# Patient Record
Sex: MALE | Race: OTHER | Employment: UNEMPLOYED | URBAN - METROPOLITAN AREA
[De-identification: names, ages, dates, MRNs, and addresses within clinical notes are randomized per-mention and may not be internally consistent; named-entity substitution may affect disease eponyms.]

---

## 2019-11-29 ENCOUNTER — IP HISTORICAL/CONVERTED ENCOUNTER (OUTPATIENT)
Dept: OTHER | Age: 39
End: 2019-11-29

## 2020-10-24 VITALS
OXYGEN SATURATION: 95 % | BODY MASS INDEX: 33.75 KG/M2 | HEART RATE: 89 BPM | TEMPERATURE: 98.2 F | WEIGHT: 210 LBS | HEIGHT: 66 IN | RESPIRATION RATE: 18 BRPM | SYSTOLIC BLOOD PRESSURE: 123 MMHG | DIASTOLIC BLOOD PRESSURE: 67 MMHG

## 2020-10-24 PROBLEM — E11.9 DIABETES MELLITUS (HCC): Status: ACTIVE | Noted: 2019-12-16

## 2020-10-24 RX ORDER — METFORMIN HYDROCHLORIDE 1000 MG/1
TABLET ORAL
COMMUNITY
Start: 2020-10-05 | End: 2020-11-20

## 2020-10-24 RX ORDER — GLIMEPIRIDE 2 MG/1
TABLET ORAL
COMMUNITY
End: 2021-07-07

## 2020-10-24 RX ORDER — BLOOD SUGAR DIAGNOSTIC
STRIP MISCELLANEOUS
COMMUNITY
End: 2020-12-03 | Stop reason: SDUPTHER

## 2020-11-20 RX ORDER — METFORMIN HYDROCHLORIDE 1000 MG/1
TABLET ORAL
Qty: 60 TAB | Refills: 0 | Status: SHIPPED | OUTPATIENT
Start: 2020-11-20 | End: 2020-12-03 | Stop reason: SDUPTHER

## 2020-11-22 ENCOUNTER — HOSPITAL ENCOUNTER (EMERGENCY)
Age: 40
Discharge: HOME | End: 2020-11-22
Attending: EMERGENCY MEDICINE
Payer: COMMERCIAL

## 2020-11-22 VITALS
OXYGEN SATURATION: 99 % | SYSTOLIC BLOOD PRESSURE: 130 MMHG | WEIGHT: 210 LBS | DIASTOLIC BLOOD PRESSURE: 108 MMHG | RESPIRATION RATE: 18 BRPM | TEMPERATURE: 97.9 F | BODY MASS INDEX: 33.89 KG/M2 | HEART RATE: 75 BPM

## 2020-11-22 DIAGNOSIS — R19.7 DIARRHEA, UNSPECIFIED TYPE: Primary | ICD-10-CM

## 2020-11-22 PROCEDURE — 99282 EMERGENCY DEPT VISIT SF MDM: CPT

## 2020-11-22 NOTE — ED TRIAGE NOTES
Pt states he has been having diarrhea for two days. Pt states he has had two covid test done at work that came back negative. Pt works for [employer] and states they will not let him return until he is covid swabbed. Pt denies Sob. Pt reports diarrhea, no changes in taste or appetite. Pt is in no apparent distress. Pt denies pain.

## 2020-11-22 NOTE — ED PROVIDER NOTES
EMERGENCY DEPARTMENT HISTORY AND PHYSICAL EXAM      Date: 11/22/2020  Patient Name: Gail Clemente    History of Presenting Illness     Chief Complaint   Patient presents with    Diarrhea       History Provided By: Patient    HPI: Gail Clemente, 47 y.o. male with a past medical history significant diabetes presents to the ED with cc of diarrhea for 2 days had one episode today    There are no other complaints, changes, or physical findings at this time. PCP: Kushal Burton MD    No current facility-administered medications on file prior to encounter. Current Outpatient Medications on File Prior to Encounter   Medication Sig Dispense Refill    insulin NPH/insulin regular (NovoLIN 70/30 U-100 Insulin) 100 unit/mL (70-30) injection by SubCUTAneous route.  metFORMIN (GLUCOPHAGE) 1,000 mg tablet Take 1 tablet by mouth twice daily 60 Tab 0    glucose blood VI test strips (Accu-Chek Chika Plus test strp) strip Accu-Chek Chika Plus test strips   Use to check sugar once a day  DX: E11.9  NPI [id number]      glimepiride (AMARYL) 2 mg tablet glimepiride 2 mg tablet   TAKE 1 TABLET BY MOUTH ONCE DAILY         Past History     Past Medical History:  Past Medical History:   Diagnosis Date    Diabetes mellitus ([address] 12/16/2019       Past Surgical History:  Past Surgical History:   Procedure Laterality Date    HX APPENDECTOMY         Family History:  Family History   Problem Relation Age of Onset    Diabetes Maternal Grandmother     Diabetes Paternal Grandmother     Hypertension Paternal Grandmother        Social History:  Social History     Tobacco Use    Smoking status: Never Smoker    Smokeless tobacco: Never Used   Substance Use Topics    Alcohol use: Not Currently    Drug use: Not on file       Allergies:  No Known Allergies      Review of Systems     Review of Systems   Constitutional: Positive for fatigue. Negative for chills. HENT: Negative for rhinorrhea and sore throat.     Eyes: Negative for pain and redness. Respiratory: Negative for cough and shortness of breath. Cardiovascular: Negative for chest pain and leg swelling. Gastrointestinal: Positive for diarrhea. Negative for abdominal pain and nausea. Endocrine: Negative for polydipsia and polyphagia. Genitourinary: Negative for dysuria and urgency. Musculoskeletal: Negative for arthralgias and myalgias. Skin: Negative for color change and pallor. Neurological: Negative for weakness and numbness. Psychiatric/Behavioral: Negative. Physical Exam     Physical Exam  Vitals signs and nursing note reviewed. Constitutional:       Appearance: Normal appearance. HENT:      Head: Normocephalic and atraumatic. Nose: Nose normal.      Mouth/Throat:      Mouth: Mucous membranes are moist.      Pharynx: Oropharynx is clear. Eyes:      Extraocular Movements: Extraocular movements intact. Conjunctiva/sclera: Conjunctivae normal.      Pupils: Pupils are equal, round, and reactive to light. Neck:      Musculoskeletal: Normal range of motion and neck supple. Cardiovascular:      Rate and Rhythm: Normal rate and regular rhythm. Pulses: Normal pulses. Heart sounds: Normal heart sounds. Pulmonary:      Effort: Pulmonary effort is normal.      Breath sounds: Normal breath sounds. Abdominal:      General: Bowel sounds are normal.      Palpations: Abdomen is soft. Musculoskeletal: Normal range of motion. Skin:     General: Skin is warm and dry. Capillary Refill: Capillary refill takes less than 2 seconds. Neurological:      General: No focal deficit present. Mental Status: He is alert. Mental status is at baseline. Psychiatric:         Mood and Affect: Mood normal.         Lab and Diagnostic Study Results     Labs -   No results found for this or any previous visit (from the past 12 hour(s)).     Radiologic Studies -   @lastxrresult@  CT Results  (Last 48 hours)    None        CXR Results  (Last 48 hours)    None            Medical Decision Making   - I am the first provider for this patient. - I reviewed the vital signs, available nursing notes, past medical history, past surgical history, family history and social history. - Initial assessment performed. The patients presenting problems have been discussed, and they are in agreement with the care plan formulated and outlined with them. I have encouraged them to ask questions as they arise throughout their visit. Vital Signs-Reviewed the patient's vital signs. Patient Vitals for the past 12 hrs:   Temp Pulse Resp BP SpO2   11/22/20 1622 -- -- -- -- 99 %   11/22/20 1619 97.9 °F (36.6 °C) 91 16 129/85 99 %       Records Reviewed: Nursing Notes    The patient presents with abdominal pain with a differential diagnosis of cholecystitis, diverticulitis and gastroenteritis      ED Course:     ED Course as of Nov 22 1644   Sun Nov 22, 2020 1642 Patient specifically stated he is here for COVID-19 testing; based on patient's presentation appearance vital signs and physical exam there is no emergent reason for me to test patient for Covid-19    [SB]   1643 Patient declined any further lab work    [SB]      ED Course User Index  [SB] Aure Bansal MD       Provider Notes (Medical Decision Making): MDM       Procedures   Medical Decision Makingedical Decision Making  Performed by: Mason Melgar MD  PROCEDURES:  Procedures       Disposition   Disposition: Condition stable        DISCHARGE PLAN:  1. Current Discharge Medication List      CONTINUE these medications which have NOT CHANGED    Details   insulin NPH/insulin regular (NovoLIN 70/30 U-100 Insulin) 100 unit/mL (70-30) injection by SubCUTAneous route.       metFORMIN (GLUCOPHAGE) 1,000 mg tablet Take 1 tablet by mouth twice daily  Qty: 60 Tab, Refills: 0      glucose blood VI test strips (Accu-Chek Chika Plus test strp) strip Accu-Chek Chika Plus test strips   Use to check sugar once a day  DX: E11.9  NPI [id number]      glimepiride (AMARYL) 2 mg tablet glimepiride 2 mg tablet   TAKE 1 TABLET BY MOUTH ONCE DAILY           2. Follow-up Information     Follow up With Specialties Details Why Contact Info    Yrn Abbott MD Family Medicine Schedule an appointment as soon as possible for a visit in 1 day  [id number] [facility]  [phone]          3. Return to ED if worse   4. Current Discharge Medication List            Diagnosis     Clinical Impression:   1. Diarrhea, unspecified type        Attestations:    Miguel Benavides MD    Please note that this dictation was completed with Flo[employer], the [employer] voice recognition software. Quite often unanticipated grammatical, syntax, homophones, and other interpretive errors are inadvertently transcribed by the computer software. Please disregard these errors. Please excuse any errors that have escaped final proofreading. Thank you.

## 2020-11-22 NOTE — LETTER
[address] [facility] EMERGENCY DEPT 
[address] 
[phone] Work/School Note Date: 11/22/2020 To Whom It May concern: 
 
 
Hodan Richardson was seen and treated today in the emergency room by the following provider(s): 
Attending Provider: Harlan Medina MD. Hodan Richardson is excused from work/school on 11/22/20. He is clear to return to work/school on 11/23/20. Sincerely, Riki Alexandra MD

## 2020-12-03 ENCOUNTER — OFFICE VISIT (OUTPATIENT)
Dept: PRIMARY CARE CLINIC | Age: 40
End: 2020-12-03
Payer: COMMERCIAL

## 2020-12-03 VITALS
RESPIRATION RATE: 20 BRPM | HEART RATE: 92 BPM | OXYGEN SATURATION: 96 % | HEIGHT: 66 IN | BODY MASS INDEX: 29.65 KG/M2 | DIASTOLIC BLOOD PRESSURE: 75 MMHG | SYSTOLIC BLOOD PRESSURE: 117 MMHG | TEMPERATURE: 97.3 F | WEIGHT: 184.5 LBS

## 2020-12-03 DIAGNOSIS — Z79.4 TYPE 2 DIABETES MELLITUS WITHOUT COMPLICATION, WITH LONG-TERM CURRENT USE OF INSULIN (HCC): Primary | ICD-10-CM

## 2020-12-03 DIAGNOSIS — E11.9 TYPE 2 DIABETES MELLITUS WITHOUT COMPLICATION, WITH LONG-TERM CURRENT USE OF INSULIN (HCC): Primary | ICD-10-CM

## 2020-12-03 PROCEDURE — 99213 OFFICE O/P EST LOW 20 MIN: CPT | Performed by: FAMILY MEDICINE

## 2020-12-03 RX ORDER — BLOOD SUGAR DIAGNOSTIC
STRIP MISCELLANEOUS
Qty: 180 STRIP | Refills: 1 | Status: SHIPPED | OUTPATIENT
Start: 2020-12-03 | End: 2021-10-25 | Stop reason: SDUPTHER

## 2020-12-03 RX ORDER — METFORMIN HYDROCHLORIDE 1000 MG/1
TABLET ORAL
Qty: 180 TAB | Refills: 1 | Status: SHIPPED | OUTPATIENT
Start: 2020-12-03 | End: 2021-01-05 | Stop reason: SDUPTHER

## 2020-12-03 RX ORDER — LANCETS
EACH MISCELLANEOUS
Qty: 180 EACH | Refills: 1 | Status: SHIPPED | OUTPATIENT
Start: 2020-12-03 | End: 2020-12-11 | Stop reason: SDUPTHER

## 2020-12-03 RX ORDER — BLOOD-GLUCOSE METER
EACH MISCELLANEOUS
Qty: 1 EACH | Refills: 0 | Status: SHIPPED | OUTPATIENT
Start: 2020-12-03 | End: 2021-10-25 | Stop reason: SDUPTHER

## 2020-12-03 NOTE — PROGRESS NOTES
Rosie Christian is a 47 y.o. male who presents today for the following:  Chief Complaint   Patient presents with    Diabetes    Labs    Follow Up Chronic Condition   ,     ICD-10-CM ICD-9-CM    1. Type 2 diabetes mellitus without complication, with long-term current use of insulin ([facility])  E11.9 250.00 metFORMIN (GLUCOPHAGE) 1,000 mg tablet    Z79.4 V58.67 insulin NPH/insulin regular (NovoLIN 70/30 U-100 Insulin) 100 unit/mL (70-30) injection      Blood-Glucose Meter (Accu-Chek Chika Plus Meter) misc      glucose blood VI test strips (Accu-Chek Chika Plus test strp) strip      lancets (Accu-Chek Multiclix Lancet) misc      CBC WITH AUTOMATED DIFF      METABOLIC PANEL, COMPREHENSIVE      LIPID PANEL      URINALYSIS W/ RFLX MICROSCOPIC      MICROALBUMIN, UR, RAND W/ MICROALB/CREAT RATIO      HEMOGLOBIN A1C WITH EAG      C-PEPTIDE    . This patient comes in for follow-up of the emergency room visit where he is found to have very high sugar he was having diarrhea. He restarted his Metformin and glimepiride and he continues to take his insulin. His recent sugars have been better controlled. He feels well and we will get labs today to see how he is doing today. No Known Allergies    Current Outpatient Medications   Medication Sig    metFORMIN (GLUCOPHAGE) 1,000 mg tablet Take 1 tablet by mouth twice daily  Indications: type 2 diabetes mellitus    insulin NPH/insulin regular (NovoLIN 70/30 U-100 Insulin) 100 unit/mL (70-30) injection 26 Units by SubCUTAneous route two (2) times a day.  Indications: type 2 diabetes mellitus    Blood-Glucose Meter (Accu-Chek Chika Plus Meter) misc Check sugar twice a day    glucose blood VI test strips (Accu-Chek Chika Plus test strp) strip Check Sugar twice a day    lancets (Accu-Chek Multiclix Lancet) misc Use twice a day to check sugars    glimepiride (AMARYL) 2 mg tablet glimepiride 2 mg tablet   TAKE 1 TABLET BY MOUTH ONCE DAILY     No current facility-administered medications for this visit. Past Medical History:   Diagnosis Date    Diabetes mellitus ([facility] 12/16/2019       Past Surgical History:   Procedure Laterality Date    HX APPENDECTOMY         Family History   Problem Relation Age of Onset    Diabetes Maternal Grandmother     Diabetes Paternal Grandmother     Hypertension Paternal Grandmother        Social History     Socioeconomic History    Marital status: SINGLE     Spouse name: Not on file    Number of children: Not on file    Years of education: Not on file    Highest education level: Not on file   Occupational History    Not on file   Social Needs    Financial resource strain: Not on file    Food insecurity     Worry: Not on file     Inability: Not on file    Transportation needs     Medical: Not on file     Non-medical: Not on file   Tobacco Use    Smoking status: Never Smoker    Smokeless tobacco: Never Used   Substance and Sexual Activity    Alcohol use: Yes     Comment: Very rarely.  Drug use: Not Currently    Sexual activity: Not on file   Lifestyle    Physical activity     Days per week: Not on file     Minutes per session: Not on file    Stress: Not on file   Relationships    Social connections     Talks on phone: Not on file     Gets together: Not on file     Attends [employer] service: Not on file     Active member of club or organization: Not on file     Attends meetings of clubs or organizations: Not on file     Relationship status: Not on file    Intimate partner violence     Fear of current or ex partner: Not on file     Emotionally abused: Not on file     Physically abused: Not on file     Forced sexual activity: Not on file   Other Topics Concern    Not on file   Social History Narrative    Not on file         Review of Systems   Constitutional: Positive for weight loss. Eyes:        Saw eye doctor 2 months ago   Respiratory: Negative. Cardiovascular: Negative. Gastrointestinal: Negative.     Genitourinary: Negative. Musculoskeletal: Negative. Neurological: Negative. Muscle Cramps   Endo/Heme/Allergies: Will check labs to see how his sugar is doing   Psychiatric/Behavioral: Negative. All other systems reviewed and are negative. Visit Vitals  /75 (BP 1 Location: Right arm, BP Patient Position: Sitting)   Pulse 92   Temp 97.3 °F (36.3 °C) (Skin)   Resp 20   Ht 5' 6\" (1.676 m)   Wt 184 lb 8 oz (83.7 kg)   SpO2 96%   BMI 29.78 kg/m²       Physical Exam  Vitals signs and nursing note reviewed. Constitutional:       Appearance: Normal appearance. He is normal weight. HENT:      Head: Normocephalic and atraumatic. Neck:      Musculoskeletal: Normal range of motion and neck supple. Cardiovascular:      Rate and Rhythm: Normal rate and regular rhythm. Pulses: Normal pulses. Heart sounds: Normal heart sounds. Pulmonary:      Effort: Pulmonary effort is normal.      Breath sounds: Normal breath sounds. Abdominal:      General: Abdomen is flat. Bowel sounds are normal.      Palpations: Abdomen is soft. Musculoskeletal: Normal range of motion. Skin:     General: Skin is warm and dry. Neurological:      General: No focal deficit present. Mental Status: He is alert. Psychiatric:         Mood and Affect: Mood normal.         Behavior: Behavior normal.         Thought Content: Thought content normal.         Judgment: Judgment normal.            ICD-10-CM ICD-9-CM    1.  Type 2 diabetes mellitus without complication, with long-term current use of insulin ([facility])  E11.9 250.00 metFORMIN (GLUCOPHAGE) 1,000 mg tablet    Z79.4 V58.67 insulin NPH/insulin regular (NovoLIN 70/30 U-100 Insulin) 100 unit/mL (70-30) injection      Blood-Glucose Meter (Accu-Chek Chika Plus Meter) misc      glucose blood VI test strips (Accu-Chek Chika Plus test strp) strip      lancets (Accu-Chek Multiclix Lancet) misc      CBC WITH AUTOMATED DIFF      METABOLIC PANEL, COMPREHENSIVE      LIPID PANEL      URINALYSIS W/ RFLX MICROSCOPIC      MICROALBUMIN, UR, RAND W/ MICROALB/CREAT RATIO      HEMOGLOBIN A1C WITH EAG      C-PEPTIDE        1. Type 2 diabetes mellitus without complication, with long-term current use of insulin [facility]  Discussed this patient's diabetes. It is possible he has type 1 diabetes as he is very sensitive to reducing his insulin. He was in DKA with weight loss and frequent urination. We will check labs today to see how he is doing and I instructed him on how serious it is to take his medications regularly and we will get him a glucometer so we can manage this as well. We will consider referring him to an endocrinologist depending on what his numbers show.  - metFORMIN (GLUCOPHAGE) 1,000 mg tablet; Take 1 tablet by mouth twice daily  Indications: type 2 diabetes mellitus  Dispense: 180 Tab; Refill: 1  - insulin NPH/insulin regular (NovoLIN 70/30 U-100 Insulin) 100 unit/mL (70-30) injection; 26 Units by SubCUTAneous route two (2) times a day. Indications: type 2 diabetes mellitus  Dispense: 4680 Units; Refill: 1  - Blood-Glucose Meter (Accu-Chek Chika Plus Meter) misc; Check sugar twice a day  Dispense: 1 Each; Refill: 0  - glucose blood VI test strips (Accu-Chek Chika Plus test strp) strip; Check Sugar twice a day  Dispense: 180 Strip;  Refill: 1  - lancets (Accu-Chek Multiclix Lancet) misc; Use twice a day to check sugars  Dispense: 180 Each; Refill: 1  - CBC WITH AUTOMATED DIFF  - METABOLIC PANEL, COMPREHENSIVE  - LIPID PANEL  - URINALYSIS W/ RFLX MICROSCOPIC  - MICROALBUMIN, UR, RAND W/ MICROALB/CREAT RATIO  - HEMOGLOBIN A1C WITH EAG  - C-PEPTIDE

## 2020-12-04 LAB
ALBUMIN SERPL-MCNC: 4.6 G/DL (ref 4–5)
ALBUMIN/CREAT UR: 50 MG/G CREAT (ref 0–29)
ALBUMIN/GLOB SERPL: 1.7 {RATIO} (ref 1.2–2.2)
ALP SERPL-CCNC: 240 IU/L (ref 39–117)
ALT SERPL-CCNC: 37 IU/L (ref 0–44)
APPEARANCE UR: CLEAR
AST SERPL-CCNC: 22 IU/L (ref 0–40)
BASOPHILS # BLD AUTO: 0.1 X10E3/UL (ref 0–0.2)
BASOPHILS NFR BLD AUTO: 1 %
BILIRUB SERPL-MCNC: 0.6 MG/DL (ref 0–1.2)
BILIRUB UR QL STRIP: NEGATIVE
BUN SERPL-MCNC: 11 MG/DL (ref 6–20)
BUN/CREAT SERPL: 15 (ref 9–20)
C PEPTIDE SERPL-MCNC: 1 NG/ML (ref 1.1–4.4)
CALCIUM SERPL-MCNC: 9.8 MG/DL (ref 8.7–10.2)
CHLORIDE SERPL-SCNC: 90 MMOL/L (ref 96–106)
CHOLEST SERPL-MCNC: 250 MG/DL (ref 100–199)
CO2 SERPL-SCNC: 22 MMOL/L (ref 20–29)
COLOR UR: YELLOW
CREAT SERPL-MCNC: 0.73 MG/DL (ref 0.76–1.27)
CREAT UR-MCNC: 21.8 MG/DL
EOSINOPHIL # BLD AUTO: 0.2 X10E3/UL (ref 0–0.4)
EOSINOPHIL NFR BLD AUTO: 2 %
ERYTHROCYTE [DISTWIDTH] IN BLOOD BY AUTOMATED COUNT: 12.4 % (ref 11.6–15.4)
EST. AVERAGE GLUCOSE BLD GHB EST-MCNC: >398 MG/DL
GLOBULIN SER CALC-MCNC: 2.7 G/DL (ref 1.5–4.5)
GLUCOSE SERPL-MCNC: 472 MG/DL (ref 65–99)
GLUCOSE UR QL: ABNORMAL
HBA1C MFR BLD: >15.5 % (ref 4.8–5.6)
HCT VFR BLD AUTO: 42.4 % (ref 37.5–51)
HDLC SERPL-MCNC: 51 MG/DL
HGB BLD-MCNC: 14.1 G/DL (ref 13–17.7)
HGB UR QL STRIP: NEGATIVE
IMM GRANULOCYTES # BLD AUTO: 0 X10E3/UL (ref 0–0.1)
IMM GRANULOCYTES NFR BLD AUTO: 0 %
KETONES UR QL STRIP: ABNORMAL
LDLC SERPL CALC-MCNC: 155 MG/DL (ref 0–99)
LEUKOCYTE ESTERASE UR QL STRIP: NEGATIVE
LYMPHOCYTES # BLD AUTO: 3.6 X10E3/UL (ref 0.7–3.1)
LYMPHOCYTES NFR BLD AUTO: 36 %
MCH RBC QN AUTO: 30.1 PG (ref 26.6–33)
MCHC RBC AUTO-ENTMCNC: 33.3 G/DL (ref 31.5–35.7)
MCV RBC AUTO: 91 FL (ref 79–97)
MICRO URNS: ABNORMAL
MICROALBUMIN UR-MCNC: 10.8 UG/ML
MONOCYTES # BLD AUTO: 0.4 X10E3/UL (ref 0.1–0.9)
MONOCYTES NFR BLD AUTO: 4 %
NEUTROPHILS # BLD AUTO: 5.7 X10E3/UL (ref 1.4–7)
NEUTROPHILS NFR BLD AUTO: 57 %
NITRITE UR QL STRIP: NEGATIVE
PH UR STRIP: 6 [PH] (ref 5–7.5)
PLATELET # BLD AUTO: 219 X10E3/UL (ref 150–450)
POTASSIUM SERPL-SCNC: 4.8 MMOL/L (ref 3.5–5.2)
PROT SERPL-MCNC: 7.3 G/DL (ref 6–8.5)
PROT UR QL STRIP: NEGATIVE
RBC # BLD AUTO: 4.68 X10E6/UL (ref 4.14–5.8)
SODIUM SERPL-SCNC: 130 MMOL/L (ref 134–144)
SP GR UR: >=1.03 (ref 1–1.03)
TRIGL SERPL-MCNC: 239 MG/DL (ref 0–149)
UROBILINOGEN UR STRIP-MCNC: 0.2 MG/DL (ref 0.2–1)
VLDLC SERPL CALC-MCNC: 44 MG/DL (ref 5–40)
WBC # BLD AUTO: 9.9 X10E3/UL (ref 3.4–10.8)

## 2020-12-10 ENCOUNTER — TELEPHONE (OUTPATIENT)
Dept: PRIMARY CARE CLINIC | Age: 40
End: 2020-12-10

## 2020-12-11 DIAGNOSIS — Z79.4 TYPE 2 DIABETES MELLITUS WITHOUT COMPLICATION, WITH LONG-TERM CURRENT USE OF INSULIN (HCC): ICD-10-CM

## 2020-12-11 DIAGNOSIS — E11.9 TYPE 2 DIABETES MELLITUS WITHOUT COMPLICATION, WITH LONG-TERM CURRENT USE OF INSULIN (HCC): ICD-10-CM

## 2020-12-11 RX ORDER — LANCETS
EACH MISCELLANEOUS
Qty: 200 EACH | Refills: 1 | Status: SHIPPED | OUTPATIENT
Start: 2020-12-11 | End: 2021-10-25 | Stop reason: SDUPTHER

## 2020-12-28 ENCOUNTER — TELEPHONE (OUTPATIENT)
Dept: PRIMARY CARE CLINIC | Age: 40
End: 2020-12-28

## 2020-12-30 ENCOUNTER — TELEPHONE (OUTPATIENT)
Dept: PRIMARY CARE CLINIC | Age: 40
End: 2020-12-30

## 2020-12-30 NOTE — TELEPHONE ENCOUNTER
Attempted to contact patient, lvm for patient to please return call.
Clarify why he wants to stop the metformin. The Glimepiride is not likely strong enough to control his sugar. If he has side effects from the metformin, we could decrease the dose of metformin and add the Glimepiride.    If he insists on trying the Glimepiride by itself, he would need to get a fasting Comp Chem 2 weeks after stopping the Metformin
no

## 2021-01-04 ENCOUNTER — TELEPHONE (OUTPATIENT)
Dept: PRIMARY CARE CLINIC | Age: 41
End: 2021-01-04

## 2021-01-04 DIAGNOSIS — E11.69 HYPERLIPIDEMIA ASSOCIATED WITH TYPE 2 DIABETES MELLITUS (HCC): ICD-10-CM

## 2021-01-04 DIAGNOSIS — Z79.4 TYPE 2 DIABETES MELLITUS WITHOUT COMPLICATION, WITH LONG-TERM CURRENT USE OF INSULIN (HCC): Primary | ICD-10-CM

## 2021-01-04 DIAGNOSIS — E11.9 TYPE 2 DIABETES MELLITUS WITHOUT COMPLICATION, WITH LONG-TERM CURRENT USE OF INSULIN (HCC): Primary | ICD-10-CM

## 2021-01-04 DIAGNOSIS — E78.5 HYPERLIPIDEMIA ASSOCIATED WITH TYPE 2 DIABETES MELLITUS (HCC): ICD-10-CM

## 2021-01-04 RX ORDER — ROSUVASTATIN CALCIUM 10 MG/1
10 TABLET, COATED ORAL
Qty: 90 TAB | Refills: 1 | Status: SHIPPED | OUTPATIENT
Start: 2021-01-04 | End: 2021-10-19 | Stop reason: SDUPTHER

## 2021-01-05 ENCOUNTER — TELEPHONE (OUTPATIENT)
Dept: PRIMARY CARE CLINIC | Age: 41
End: 2021-01-05

## 2021-01-05 DIAGNOSIS — Z79.4 TYPE 2 DIABETES MELLITUS WITHOUT COMPLICATION, WITH LONG-TERM CURRENT USE OF INSULIN (HCC): ICD-10-CM

## 2021-01-05 DIAGNOSIS — E11.9 TYPE 2 DIABETES MELLITUS WITHOUT COMPLICATION, WITH LONG-TERM CURRENT USE OF INSULIN (HCC): ICD-10-CM

## 2021-01-05 RX ORDER — METFORMIN HYDROCHLORIDE 1000 MG/1
TABLET ORAL
Qty: 60 TAB | Refills: 1 | Status: SHIPPED | OUTPATIENT
Start: 2021-01-05 | End: 2021-10-19 | Stop reason: SDUPTHER

## 2021-01-08 ENCOUNTER — TELEPHONE (OUTPATIENT)
Dept: PRIMARY CARE CLINIC | Age: 41
End: 2021-01-08

## 2021-02-22 DIAGNOSIS — E11.9 TYPE 2 DIABETES MELLITUS WITHOUT COMPLICATION, WITH LONG-TERM CURRENT USE OF INSULIN (HCC): ICD-10-CM

## 2021-02-22 DIAGNOSIS — Z79.4 TYPE 2 DIABETES MELLITUS WITHOUT COMPLICATION, WITH LONG-TERM CURRENT USE OF INSULIN (HCC): ICD-10-CM

## 2021-02-25 NOTE — TELEPHONE ENCOUNTER
Requested Prescriptions     Pending Prescriptions Disp Refills    insulin NPH/insulin regular (NovoLIN 70/30 U-100 Insulin) 100 unit/mL (70-30) injection 4680 Units 1     Si Units by SubCUTAneous route two (2) times a day. Indications: type 2 diabetes mellitus         Pt last seen in office 2020. Labs done 2020.

## 2021-07-07 ENCOUNTER — HOSPITAL ENCOUNTER (EMERGENCY)
Age: 41
Discharge: HOME | End: 2021-07-07
Attending: EMERGENCY MEDICINE
Payer: COMMERCIAL

## 2021-07-07 VITALS
SYSTOLIC BLOOD PRESSURE: 126 MMHG | TEMPERATURE: 97.5 F | DIASTOLIC BLOOD PRESSURE: 74 MMHG | HEART RATE: 87 BPM | RESPIRATION RATE: 18 BRPM | OXYGEN SATURATION: 99 %

## 2021-07-07 DIAGNOSIS — S39.012A LOW BACK STRAIN, INITIAL ENCOUNTER: Primary | ICD-10-CM

## 2021-07-07 DIAGNOSIS — M77.8 LEFT WRIST TENDINITIS: ICD-10-CM

## 2021-07-07 PROCEDURE — 99283 EMERGENCY DEPT VISIT LOW MDM: CPT

## 2021-07-07 PROCEDURE — 74011250637 HC RX REV CODE- 250/637: Performed by: EMERGENCY MEDICINE

## 2021-07-07 RX ORDER — IBUPROFEN 600 MG/1
600 TABLET ORAL
Qty: 20 TABLET | Refills: 0 | Status: SHIPPED | OUTPATIENT
Start: 2021-07-07 | End: 2022-03-16 | Stop reason: SDUPTHER

## 2021-07-07 RX ORDER — IBUPROFEN 800 MG/1
800 TABLET ORAL
Status: COMPLETED | OUTPATIENT
Start: 2021-07-07 | End: 2021-07-07

## 2021-07-07 RX ADMIN — IBUPROFEN 800 MG: 800 TABLET, FILM COATED ORAL at 10:57

## 2021-07-07 NOTE — ED PROVIDER NOTES
EMERGENCY DEPARTMENT HISTORY AND PHYSICAL EXAM      Date: 7/7/2021  Patient Name: Kiera Castro    History of Presenting Illness     Chief Complaint   Patient presents with    Back Pain    Wrist Pain       History Provided By: Patient    HPI: Kiera Castro, 47 y.o. 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 PCP: Mallika Desai MD    No current facility-administered medications on file prior to encounter. Current Outpatient Medications on File Prior to Encounter   Medication Sig Dispense Refill    insulin NPH/insulin regular (NovoLIN 70/30 U-100 Insulin) 100 unit/mL (70-30) injection 26 Units by SubCUTAneous route two (2) times a day. Indications: type 2 diabetes mellitus 4680 Units 1    metFORMIN (GLUCOPHAGE) 1,000 mg tablet Take 1 tablet by mouth twice daily  Indications: type 2 diabetes mellitus 60 Tab 1    rosuvastatin (CRESTOR) 10 mg tablet Take 1 Tab by mouth nightly.  Indications: high cholesterol and high triglycerides 90 Tab 1    lancets (Accu-Chek Multiclix Lancet) misc Use twice a day to check sugars 200 Each 1    Blood-Glucose Meter (Accu-Chek Chika Plus Meter) misc Check sugar twice a day 1 Each 0    glucose blood VI test strips (Accu-Chek Chika Plus test strp) strip Check Sugar twice a day 180 Strip 1    [DISCONTINUED] glimepiride (AMARYL) 2 mg tablet glimepiride 2 mg tablet   TAKE 1 TABLET BY MOUTH ONCE DAILY         Past History     Past Medical History:  Past Medical History:   Diagnosis Date    Diabetes mellitus ([address] 12/16/2019       Past Surgical History:  Past Surgical History:   Procedure Laterality Date    HX APPENDECTOMY         Family History:  Family History   Problem Relation Age of Onset    Diabetes Maternal Grandmother     Diabetes Paternal Grandmother     Hypertension Paternal Grandmother        Social History:  Social History     Tobacco Use    Smoking status: Never Smoker    Smokeless tobacco: Never Used   Substance Use Topics    Alcohol use: Yes     Comment: Very rarely.  Drug use: Not Currently       Allergies:  No Known Allergies      Review of Systems     Review of Systems   Constitutional: Negative for chills and fever. HENT: Negative for rhinorrhea and sore throat. Eyes: Negative for pain and visual disturbance. Respiratory: Negative for cough and shortness of breath. Cardiovascular: Negative for chest pain and leg swelling. Gastrointestinal: Negative for abdominal pain and vomiting. Endocrine: Negative for polydipsia and polyuria. Genitourinary: Negative for dysuria and urgency. Musculoskeletal: Positive for arthralgias, back pain and myalgias. Negative for neck pain and neck stiffness. Skin: Negative for color change and pallor. Neurological: Negative for weakness and numbness. Psychiatric/Behavioral: Negative. Physical Exam     Physical Exam  Vitals and nursing note reviewed. Constitutional:       Appearance: Normal appearance. HENT:      Head: Normocephalic and atraumatic. Mouth/Throat:      Mouth: Mucous membranes are moist.      Pharynx: Oropharynx is clear. Eyes:      Extraocular Movements: Extraocular movements intact. Conjunctiva/sclera: Conjunctivae normal.      Pupils: Pupils are equal, round, and reactive to light. Cardiovascular:      Rate and Rhythm: Normal rate and regular rhythm. Pulses: Normal pulses. Heart sounds: Normal heart sounds. Pulmonary:      Effort: Pulmonary effort is normal.      Breath sounds: Normal breath sounds. Abdominal:      General: Bowel sounds are normal.      Palpations: Abdomen is soft. Musculoskeletal:         General: Tenderness present. No deformity or signs of injury. Normal range of motion. Cervical back: Normal, normal range of motion and neck supple. Thoracic back: Normal.      Lumbar back: Tenderness present. No deformity or bony tenderness. Back:    Skin:     General: Skin is warm and dry. Capillary Refill: Capillary refill takes less than 2 seconds. Neurological:      General: No focal deficit present. Mental Status: He is alert and oriented to person, place, and time. Psychiatric:         Mood and Affect: Mood normal.         Behavior: Behavior normal.         Lab and Diagnostic Study Results     Labs -   No results found for this or any previous visit (from the past 12 hour(s)). Radiologic Studies -   @lastxrresult@  CT Results  (Last 48 hours)    None        CXR Results  (Last 48 hours)    None            Medical Decision Making   - I am the first provider for this patient. - I reviewed the vital signs, available nursing notes, past medical history, past surgical history, family history and social history. - Initial assessment performed. The patients presenting problems have been discussed, and they are in agreement with the care plan formulated and outlined with them. I have encouraged them to ask questions as they arise throughout their visit. Vital Signs-Reviewed the patient's vital signs. Patient Vitals for the past 12 hrs:   Temp Pulse Resp BP SpO2   07/07/21 1035 97.5 °F (36.4 °C) 87 18 126/74 99 %       Records Reviewed: Nursing Notes    The patient presents with back pain with a differential diagnosis of nephrolithiasis, pneumonia, trauma      ED Course:          Provider Notes (Medical Decision Making): MDM       Procedures   Medical Decision Makingedical Decision Making  Performed by: Bart Redding MD  PROCEDURES:  Procedures       Disposition   Disposition: Condition stable and improved  DC- Adult Discharges:  All of the diagnostic tests were reviewed and questions answered. Diagnosis, care plan and treatment options were discussed. The patient understands the instructions and will follow up as directed. The patients results have been reviewed with them. They have been counseled regarding their diagnosis. The patient verbally convey understanding and agreement of the signs, symptoms, diagnosis, treatment and prognosis and additionally agrees to follow up as recommended with their PCP in 24 - 48 hours. They also agree with the care-plan and convey that all of their questions have been answered. I have also put together some discharge instructions for them that include: 1) educational information regarding their diagnosis, 2) how to care for their diagnosis at home, as well a 3) list of reasons why they would want to return to the ED prior to their follow-up appointment, should their condition change. Discharged    DISCHARGE PLAN:  1. Current Discharge Medication List      START taking these medications    Details   ibuprofen (MOTRIN) 600 mg tablet Take 1 Tablet by mouth every six (6) hours as needed for Pain. Qty: 20 Tablet, Refills: 0         CONTINUE these medications which have NOT CHANGED    Details   insulin NPH/insulin regular (NovoLIN 70/30 U-100 Insulin) 100 unit/mL (70-30) injection 26 Units by SubCUTAneous route two (2) times a day. Indications: type 2 diabetes mellitus  Qty: 4680 Units, Refills: 1    Associated Diagnoses: Type 2 diabetes mellitus without complication, with long-term current use of insulin (HCC)      metFORMIN (GLUCOPHAGE) 1,000 mg tablet Take 1 tablet by mouth twice daily  Indications: type 2 diabetes mellitus  Qty: 60 Tab, Refills: 1    Associated Diagnoses: Type 2 diabetes mellitus without complication, with long-term current use of insulin (HCC)      rosuvastatin (CRESTOR) 10 mg tablet Take 1 Tab by mouth nightly.  Indications: high cholesterol and high triglycerides  Qty: 90 Tab, Refills: 1    Associated Diagnoses: Hyperlipidemia associated with type 2 diabetes mellitus (HCC)      lancets (Accu-Chek Multiclix Lancet) misc Use twice a day to check sugars  Qty: 200 Each, Refills: 1    Comments: Needs lancing device DX E11.9 NPI [id number]  Associated Diagnoses: Type 2 diabetes mellitus without complication, with long-term current use of insulin ([facility])      Blood-Glucose Meter (Accu-Chek Chika Plus Meter) misc Check sugar twice a day  Qty: 1 Each, Refills: 0    Associated Diagnoses: Type 2 diabetes mellitus without complication, with long-term current use of insulin ([facility])      glucose blood VI test strips (Accu-Chek Chika Plus test strp) strip Check Sugar twice a day  Qty: 180 Strip, Refills: 1    Associated Diagnoses: Type 2 diabetes mellitus without complication, with long-term current use of insulin ([facility] [address]           2. Follow-up Information     Follow up With Specialties Details Why Contact Info    Kendall Le MD [facility] Schedule an appointment as soon as possible for a visit   [address] [place] [address]  [phone]          3. Return to ED if worse   4. Current Discharge Medication List      START taking these medications    Details   ibuprofen (MOTRIN) 600 mg tablet Take 1 Tablet by mouth every six (6) hours as needed for Pain. Qty: 20 Tablet, Refills: 0  Start date: 7/7/2021               Diagnosis     Clinical Impression:   1. Low back strain, initial encounter    2. Left wrist tendinitis        Attestations:    Lauryn Hicks MD    Please note that this dictation was completed with RELEASEIF, the computer voice recognition software. Quite often unanticipated grammatical, syntax, homophones, and other interpretive errors are inadvertently transcribed by the computer software. Please disregard these errors. Please excuse any errors that have escaped final proofreading. Thank you.

## 2021-07-07 NOTE — ED TRIAGE NOTES
Pt reports left wrist and lower back pain x 2 weeks. 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 Pt has full ROM of left wrist. PT ambulatory with no difficulty.

## 2021-07-07 NOTE — LETTER
[address]  [facility] EMERGENCY DEPT  [address] [id number]  [phone]    Work/School Note    Date: 7/7/2021    To Whom It May concern:      Edwin Ganser was seen and treated today in the emergency room by the following provider(s):  Attending Provider: Beth Martins MD.      Edwin Ganser is excused from work/school on 07/07/21. He is clear to return to work/school on 07/08/21.         Sincerely,          Bonnie Mccracken MD

## 2021-10-19 DIAGNOSIS — E78.5 HYPERLIPIDEMIA ASSOCIATED WITH TYPE 2 DIABETES MELLITUS (HCC): ICD-10-CM

## 2021-10-19 DIAGNOSIS — Z79.4 TYPE 2 DIABETES MELLITUS WITHOUT COMPLICATION, WITH LONG-TERM CURRENT USE OF INSULIN (HCC): Primary | ICD-10-CM

## 2021-10-19 DIAGNOSIS — E11.69 HYPERLIPIDEMIA ASSOCIATED WITH TYPE 2 DIABETES MELLITUS (HCC): ICD-10-CM

## 2021-10-19 DIAGNOSIS — E11.9 TYPE 2 DIABETES MELLITUS WITHOUT COMPLICATION, WITH LONG-TERM CURRENT USE OF INSULIN (HCC): Primary | ICD-10-CM

## 2021-10-19 RX ORDER — METFORMIN HYDROCHLORIDE 1000 MG/1
TABLET ORAL
Qty: 180 TABLET | Refills: 1 | Status: SHIPPED | OUTPATIENT
Start: 2021-10-19 | End: 2022-03-16 | Stop reason: SDUPTHER

## 2021-10-19 RX ORDER — ROSUVASTATIN CALCIUM 10 MG/1
10 TABLET, COATED ORAL
Qty: 90 TABLET | Refills: 1 | Status: SHIPPED | OUTPATIENT
Start: 2021-10-19 | End: 2021-10-25 | Stop reason: SDUPTHER

## 2021-10-25 ENCOUNTER — VIRTUAL VISIT (OUTPATIENT)
Dept: PRIMARY CARE CLINIC | Age: 41
End: 2021-10-25
Payer: COMMERCIAL

## 2021-10-25 DIAGNOSIS — Z11.59 ENCOUNTER FOR HEPATITIS C SCREENING TEST FOR LOW RISK PATIENT: ICD-10-CM

## 2021-10-25 DIAGNOSIS — E78.5 HYPERLIPIDEMIA ASSOCIATED WITH TYPE 2 DIABETES MELLITUS (HCC): ICD-10-CM

## 2021-10-25 DIAGNOSIS — E11.8 DIABETES MELLITUS TYPE 2 WITH COMPLICATIONS (HCC): Primary | ICD-10-CM

## 2021-10-25 DIAGNOSIS — Z79.4 TYPE 2 DIABETES MELLITUS WITHOUT COMPLICATION, WITH LONG-TERM CURRENT USE OF INSULIN (HCC): ICD-10-CM

## 2021-10-25 DIAGNOSIS — E11.69 HYPERLIPIDEMIA ASSOCIATED WITH TYPE 2 DIABETES MELLITUS (HCC): ICD-10-CM

## 2021-10-25 DIAGNOSIS — E11.9 TYPE 2 DIABETES MELLITUS WITHOUT COMPLICATION, WITH LONG-TERM CURRENT USE OF INSULIN (HCC): ICD-10-CM

## 2021-10-25 PROCEDURE — 99214 OFFICE O/P EST MOD 30 MIN: CPT | Performed by: FAMILY MEDICINE

## 2021-10-25 RX ORDER — LANCETS
EACH MISCELLANEOUS
Qty: 200 EACH | Refills: 1 | Status: SHIPPED | OUTPATIENT
Start: 2021-10-25 | End: 2022-03-29 | Stop reason: SDUPTHER

## 2021-10-25 RX ORDER — ROSUVASTATIN CALCIUM 10 MG/1
10 TABLET, COATED ORAL
Qty: 90 TABLET | Refills: 1 | Status: SHIPPED | OUTPATIENT
Start: 2021-10-25

## 2021-10-25 RX ORDER — BLOOD SUGAR DIAGNOSTIC
STRIP MISCELLANEOUS
Qty: 180 STRIP | Refills: 1 | Status: SHIPPED | OUTPATIENT
Start: 2021-10-25 | End: 2022-03-29 | Stop reason: SDUPTHER

## 2021-10-25 RX ORDER — BLOOD-GLUCOSE METER
EACH MISCELLANEOUS
Qty: 1 EACH | Refills: 0 | Status: SHIPPED | OUTPATIENT
Start: 2021-10-25 | End: 2022-03-16 | Stop reason: SDUPTHER

## 2021-10-25 NOTE — PROGRESS NOTES
Hai Sloan (: 1980) is a 46 y.o. male, established patient, here for evaluation of the following chief complaint(s):   Diabetes and Hyperlipidemia       ASSESSMENT/PLAN:  Below is the assessment and plan developed based on review of pertinent history, labs, studies, and medications. 1. Diabetes mellitus type 2 with complications (HCC)  -     CBC WITH AUTOMATED DIFF  -     METABOLIC PANEL, COMPREHENSIVE  -     URINALYSIS W/ RFLX MICROSCOPIC  -     MICROALBUMIN, UR, RAND W/ MICROALB/CREAT RATIO  -     HEMOGLOBIN A1C WITH EAG  2. Hyperlipidemia associated with type 2 diabetes mellitus (HCC)  -     LIPID PANEL  -     rosuvastatin (CRESTOR) 10 mg tablet; Take 1 Tablet by mouth nightly. Indications: high cholesterol and high triglycerides, Normal, Disp-90 Tablet, R-1  3. Encounter for hepatitis C screening test for low risk patient  -     HEPATITIS C AB  4. Type 2 diabetes mellitus without complication, with long-term current use of insulin (HCC)  -     Blood-Glucose Meter (Accu-Chek Chika Plus Meter) misc; Check sugar twice a day, Normal, Disp-1 Each, R-0  -     glucose blood VI test strips (Accu-Chek Chika Plus test strp) strip; Check Sugar twice a day, Normal, Disp-180 Strip, R-1  -     lancets (Accu-Chek Multiclix Lancet) misc; Use twice a day to check sugars, Normal, Disp-200 Each, R-1Needs lancing device DX E11.9 NPI [id number]      Return in about 3 months (around 2022) for Follow up of chronic medical conditions, Fasting Lab Appointment. SUBJECTIVE/OBJECTIVE:  Patient requested a virtual video visit in order for us to refill his rosuvastatin for his hyperlipidemia. We just recently refill of his Metformin and he is taking insulin and I am not sure for prescribing this or is getting this over-the-counter.   Is been almost a year since he has been in the office and has not had any lab work in that timeframe he does think he saw an eye doctor about a year ago but has not seen one recently is starting to have burning sensation on the bottom of his feet. Otherwise he is doing fairly well      Review of Systems   Constitutional: Negative. Respiratory: Negative. Cardiovascular: Negative. Gastrointestinal: Negative. Endocrine: Negative. Genitourinary: Positive for frequency. Musculoskeletal: Negative. Neurological: Positive for numbness (Feet). Psychiatric/Behavioral: Negative. No data recorded     Physical Exam  Constitutional:       Appearance: Normal appearance. He is normal weight. Pulmonary:      Effort: Pulmonary effort is normal.   Neurological:      Mental Status: He is alert. Psychiatric:         Mood and Affect: Mood normal.         Behavior: Behavior normal.         Thought Content: Thought content normal.         Judgment: Judgment normal.         This patient appears to be doing well despite not being in the office for almost a year. I talked to him how important it was to have regular lab work at least every 6 months and probably it would be best to have it every 3 months. He does agree to make a lab appointment and come in in the next few weeks for us to see where he is at. I talked about the risk of kidney failure progressive neuropathy symptoms heart disease and stroke with uncontrolled sugar. He admits that he is urinating frequently and this could be from his sugar being out of control. He does not know where his glucometer is and I will send in a new one form to use. Landerspetros Lunaa, was evaluated through a synchronous (real-time) audio-video encounter. The patient (or guardian if applicable) is aware that this is a billable service. Verbal consent to proceed has been obtained within the past 12 months. The visit was conducted pursuant to the emergency declaration under the [facility], [address] [place] [address] and the [facility] and vzaar General Act.   Patient identification was verified, and a caregiver was present when appropriate. The patient was located in a state where the provider was credentialed to provide care. An electronic signature was used to authenticate this note.   -- Jenna Barber MD

## 2021-10-27 LAB
ALBUMIN SERPL-MCNC: 4.4 G/DL (ref 4–5)
ALBUMIN/CREAT UR: 19 MG/G CREAT (ref 0–29)
ALBUMIN/GLOB SERPL: 1.6 {RATIO} (ref 1.2–2.2)
ALP SERPL-CCNC: 205 IU/L (ref 44–121)
ALT SERPL-CCNC: 32 IU/L (ref 0–44)
APPEARANCE UR: CLEAR
AST SERPL-CCNC: 21 IU/L (ref 0–40)
BASOPHILS # BLD AUTO: 0 X10E3/UL (ref 0–0.2)
BASOPHILS NFR BLD AUTO: 1 %
BILIRUB SERPL-MCNC: 0.8 MG/DL (ref 0–1.2)
BILIRUB UR QL STRIP: NEGATIVE
BUN SERPL-MCNC: 9 MG/DL (ref 6–20)
BUN/CREAT SERPL: 14 (ref 9–20)
CALCIUM SERPL-MCNC: 9.2 MG/DL (ref 8.7–10.2)
CHLORIDE SERPL-SCNC: 97 MMOL/L (ref 96–106)
CHOLEST SERPL-MCNC: 137 MG/DL (ref 100–199)
CO2 SERPL-SCNC: 25 MMOL/L (ref 20–29)
COLOR UR: YELLOW
CREAT SERPL-MCNC: 0.65 MG/DL (ref 0.76–1.27)
CREAT UR-MCNC: 29.3 MG/DL
EOSINOPHIL # BLD AUTO: 0.3 X10E3/UL (ref 0–0.4)
EOSINOPHIL NFR BLD AUTO: 4 %
ERYTHROCYTE [DISTWIDTH] IN BLOOD BY AUTOMATED COUNT: 12.4 % (ref 11.6–15.4)
EST. AVERAGE GLUCOSE BLD GHB EST-MCNC: >398 MG/DL
GLOBULIN SER CALC-MCNC: 2.7 G/DL (ref 1.5–4.5)
GLUCOSE SERPL-MCNC: 388 MG/DL (ref 65–99)
GLUCOSE UR QL: ABNORMAL
HBA1C MFR BLD: >15.5 % (ref 4.8–5.6)
HCT VFR BLD AUTO: 42.4 % (ref 37.5–51)
HCV AB S/CO SERPL IA: <0.1 S/CO RATIO (ref 0–0.9)
HDLC SERPL-MCNC: 50 MG/DL
HGB BLD-MCNC: 14.4 G/DL (ref 13–17.7)
HGB UR QL STRIP: NEGATIVE
IMM GRANULOCYTES # BLD AUTO: 0 X10E3/UL (ref 0–0.1)
IMM GRANULOCYTES NFR BLD AUTO: 0 %
KETONES UR QL STRIP: ABNORMAL
LDLC SERPL CALC-MCNC: 63 MG/DL (ref 0–99)
LEUKOCYTE ESTERASE UR QL STRIP: NEGATIVE
LYMPHOCYTES # BLD AUTO: 2.9 X10E3/UL (ref 0.7–3.1)
LYMPHOCYTES NFR BLD AUTO: 40 %
MCH RBC QN AUTO: 30.7 PG (ref 26.6–33)
MCHC RBC AUTO-ENTMCNC: 34 G/DL (ref 31.5–35.7)
MCV RBC AUTO: 90 FL (ref 79–97)
MICRO URNS: ABNORMAL
MICROALBUMIN UR-MCNC: 5.6 UG/ML
MONOCYTES # BLD AUTO: 0.4 X10E3/UL (ref 0.1–0.9)
MONOCYTES NFR BLD AUTO: 5 %
NEUTROPHILS # BLD AUTO: 3.6 X10E3/UL (ref 1.4–7)
NEUTROPHILS NFR BLD AUTO: 50 %
NITRITE UR QL STRIP: NEGATIVE
PH UR STRIP: 7 [PH] (ref 5–7.5)
PLATELET # BLD AUTO: 212 X10E3/UL (ref 150–450)
POTASSIUM SERPL-SCNC: 4.1 MMOL/L (ref 3.5–5.2)
PROT SERPL-MCNC: 7.1 G/DL (ref 6–8.5)
PROT UR QL STRIP: NEGATIVE
RBC # BLD AUTO: 4.69 X10E6/UL (ref 4.14–5.8)
SODIUM SERPL-SCNC: 136 MMOL/L (ref 134–144)
SP GR UR: >=1.03 (ref 1–1.03)
TRIGL SERPL-MCNC: 135 MG/DL (ref 0–149)
UROBILINOGEN UR STRIP-MCNC: 0.2 MG/DL (ref 0.2–1)
VLDLC SERPL CALC-MCNC: 24 MG/DL (ref 5–40)
WBC # BLD AUTO: 7.3 X10E3/UL (ref 3.4–10.8)

## 2021-11-07 DIAGNOSIS — E11.65 UNCONTROLLED TYPE 2 DIABETES MELLITUS WITH HYPERGLYCEMIA (HCC): Primary | ICD-10-CM

## 2021-11-12 NOTE — PROGRESS NOTES
Unable to reach patient. Message states that call cannot be completed at this time, please try again later.

## 2022-01-28 ENCOUNTER — APPOINTMENT (OUTPATIENT)
Dept: GENERAL RADIOLOGY | Age: 42
End: 2022-01-28
Attending: EMERGENCY MEDICINE
Payer: COMMERCIAL

## 2022-01-28 ENCOUNTER — HOSPITAL ENCOUNTER (EMERGENCY)
Age: 42
Discharge: ANOTHER HOSPITAL | End: 2022-01-29
Attending: EMERGENCY MEDICINE
Payer: COMMERCIAL

## 2022-01-28 DIAGNOSIS — R11.2 NON-INTRACTABLE VOMITING WITH NAUSEA, UNSPECIFIED VOMITING TYPE: ICD-10-CM

## 2022-01-28 DIAGNOSIS — R53.81 MALAISE AND FATIGUE: Primary | ICD-10-CM

## 2022-01-28 DIAGNOSIS — E11.10 DIABETIC KETOACIDOSIS WITHOUT COMA ASSOCIATED WITH TYPE 2 DIABETES MELLITUS (HCC): ICD-10-CM

## 2022-01-28 DIAGNOSIS — R53.83 MALAISE AND FATIGUE: Primary | ICD-10-CM

## 2022-01-28 LAB
ALBUMIN SERPL-MCNC: 4.6 G/DL (ref 3.5–5)
ALBUMIN/GLOB SERPL: 1 {RATIO} (ref 1.1–2.2)
ALP SERPL-CCNC: 305 U/L (ref 45–117)
ALT SERPL-CCNC: 87 U/L (ref 12–78)
AMPHET UR QL SCN: NEGATIVE
ANION GAP SERPL CALC-SCNC: 19 MMOL/L (ref 5–15)
ANION GAP SERPL CALC-SCNC: 29 MMOL/L (ref 5–15)
ARTERIAL PATENCY WRIST A: ABNORMAL
AST SERPL W P-5'-P-CCNC: 30 U/L (ref 15–37)
BARBITURATES UR QL SCN: NEGATIVE
BASE DEFICIT BLDA-SCNC: 18.8 MMOL/L (ref 0–2)
BASOPHILS # BLD: 0.1 K/UL (ref 0–0.2)
BASOPHILS NFR BLD: 1 % (ref 0–2.5)
BDY SITE: ABNORMAL
BENZODIAZ UR QL: NEGATIVE
BILIRUB SERPL-MCNC: 0.9 MG/DL (ref 0.2–1)
BREATHS.SPONTANEOUS ON VENT: 18
BUN SERPL-MCNC: 11 MG/DL (ref 6–20)
BUN SERPL-MCNC: 15 MG/DL (ref 6–20)
BUN/CREAT SERPL: 11 (ref 12–20)
BUN/CREAT SERPL: 11 (ref 12–20)
CA-I BLD-MCNC: 10.7 MG/DL (ref 8.5–10.1)
CA-I BLD-MCNC: 8.7 MG/DL (ref 8.5–10.1)
CANNABINOIDS UR QL SCN: NEGATIVE
CHLORIDE SERPL-SCNC: 102 MMOL/L (ref 97–108)
CHLORIDE SERPL-SCNC: 94 MMOL/L (ref 97–108)
CO2 SERPL-SCNC: 13 MMOL/L (ref 21–32)
CO2 SERPL-SCNC: 17 MMOL/L (ref 21–32)
COCAINE UR QL SCN: NEGATIVE
COHGB MFR BLD: 0.2 % (ref 0–5)
CREAT SERPL-MCNC: 1.02 MG/DL (ref 0.7–1.3)
CREAT SERPL-MCNC: 1.31 MG/DL (ref 0.7–1.3)
DRUG SCRN COMMENT,DRGCM: NORMAL
ECSTASY, ECST: NEGATIVE
EOSINOPHIL # BLD: 0 K/UL (ref 0–0.7)
EOSINOPHIL NFR BLD: 0 % (ref 0.9–2.9)
ERYTHROCYTE [DISTWIDTH] IN BLOOD BY AUTOMATED COUNT: 14.3 % (ref 11.5–14.5)
FIO2 ON VENT: 21 %
GLOBULIN SER CALC-MCNC: 4.6 G/DL (ref 2–4)
GLUCOSE BLD STRIP.AUTO-MCNC: 280 MG/DL (ref 65–117)
GLUCOSE BLD STRIP.AUTO-MCNC: 312 MG/DL (ref 65–117)
GLUCOSE BLD STRIP.AUTO-MCNC: 358 MG/DL (ref 65–117)
GLUCOSE BLD STRIP.AUTO-MCNC: 421 MG/DL (ref 65–117)
GLUCOSE BLD STRIP.AUTO-MCNC: 504 MG/DL (ref 65–117)
GLUCOSE BLD STRIP.AUTO-MCNC: 526 MG/DL (ref 65–117)
GLUCOSE SERPL-MCNC: 331 MG/DL (ref 65–100)
GLUCOSE SERPL-MCNC: 624 MG/DL (ref 65–100)
HCO3 BLDA-SCNC: 7 MMOL/L (ref 22–26)
HCT VFR BLD AUTO: 50.4 % (ref 41–53)
HGB BLD OXIMETRY-MCNC: 15.7 G/DL (ref 13.5–17.5)
HGB BLD-MCNC: 16.8 G/DL (ref 13.5–17.5)
INR PPP: 1 (ref 0.9–1.1)
KETONES SERPL QL: POSITIVE
LYMPHOCYTES # BLD: 2.9 K/UL (ref 1–4.8)
LYMPHOCYTES NFR BLD: 26 % (ref 20.5–51.1)
MAGNESIUM SERPL-MCNC: 2.5 MG/DL (ref 1.6–2.4)
MCH RBC QN AUTO: 30.8 PG (ref 31–34)
MCHC RBC AUTO-ENTMCNC: 33.3 G/DL (ref 31–36)
MCV RBC AUTO: 92.7 FL (ref 80–100)
METHADONE UR QL: NEGATIVE
METHGB MFR BLD: 0.3 % (ref 0–5)
MONOCYTES # BLD: 0.3 K/UL (ref 0.2–2.4)
MONOCYTES NFR BLD: 3 % (ref 1.7–9.3)
NEUTS SEG # BLD: 7.8 K/UL (ref 1.8–7.7)
NEUTS SEG NFR BLD: 70 % (ref 42–75)
NRBC # BLD: 0.01 K/UL
NRBC BLD-RTO: 0.1 PER 100 WBC
OPIATES UR QL: NEGATIVE
OXYHGB MFR BLD: 98 % (ref 95–100)
PCO2 BLDA: 19 MMHG (ref 35–45)
PCP UR QL: NEGATIVE
PERFORMED BY, TECHID: ABNORMAL
PH BLDA: 7.19 [PH] (ref 7.35–7.45)
PLATELET # BLD AUTO: 322 K/UL (ref 150–400)
PMV BLD AUTO: 9.4 FL (ref 6.5–11.5)
PO2 BLDA: 131 MMHG (ref 70–100)
POTASSIUM SERPL-SCNC: 4.6 MMOL/L (ref 3.5–5.1)
POTASSIUM SERPL-SCNC: 5.2 MMOL/L (ref 3.5–5.1)
PROT SERPL-MCNC: 9.2 G/DL (ref 6.4–8.2)
PROTHROMBIN TIME: 9.6 SEC (ref 9–11.1)
RBC # BLD AUTO: 5.44 M/UL (ref 4.5–5.9)
SAO2% DEVICE SAO2% SENSOR NAME: ABNORMAL
SERVICE CMNT-IMP: YES
SODIUM SERPL-SCNC: 136 MMOL/L (ref 136–145)
SODIUM SERPL-SCNC: 138 MMOL/L (ref 136–145)
SPECIMEN SITE: ABNORMAL
TROPONIN-HIGH SENSITIVITY: 10 NG/L (ref 0–76)
WBC # BLD AUTO: 11.1 K/UL (ref 4.4–11.3)

## 2022-01-28 PROCEDURE — 74011000258 HC RX REV CODE- 258: Performed by: EMERGENCY MEDICINE

## 2022-01-28 PROCEDURE — 74011636637 HC RX REV CODE- 636/637: Performed by: EMERGENCY MEDICINE

## 2022-01-28 PROCEDURE — 36600 WITHDRAWAL OF ARTERIAL BLOOD: CPT

## 2022-01-28 PROCEDURE — 80307 DRUG TEST PRSMV CHEM ANLYZR: CPT

## 2022-01-28 PROCEDURE — 74011250636 HC RX REV CODE- 250/636: Performed by: EMERGENCY MEDICINE

## 2022-01-28 PROCEDURE — 80048 BASIC METABOLIC PNL TOTAL CA: CPT

## 2022-01-28 PROCEDURE — 96366 THER/PROPH/DIAG IV INF ADDON: CPT

## 2022-01-28 PROCEDURE — 74011250637 HC RX REV CODE- 250/637: Performed by: EMERGENCY MEDICINE

## 2022-01-28 PROCEDURE — 83735 ASSAY OF MAGNESIUM: CPT

## 2022-01-28 PROCEDURE — 85610 PROTHROMBIN TIME: CPT

## 2022-01-28 PROCEDURE — 96375 TX/PRO/DX INJ NEW DRUG ADDON: CPT

## 2022-01-28 PROCEDURE — 99285 EMERGENCY DEPT VISIT HI MDM: CPT

## 2022-01-28 PROCEDURE — 84484 ASSAY OF TROPONIN QUANT: CPT

## 2022-01-28 PROCEDURE — 82803 BLOOD GASES ANY COMBINATION: CPT

## 2022-01-28 PROCEDURE — 82962 GLUCOSE BLOOD TEST: CPT

## 2022-01-28 PROCEDURE — 80053 COMPREHEN METABOLIC PANEL: CPT

## 2022-01-28 PROCEDURE — 36415 COLL VENOUS BLD VENIPUNCTURE: CPT

## 2022-01-28 PROCEDURE — 82009 KETONE BODYS QUAL: CPT

## 2022-01-28 PROCEDURE — 85025 COMPLETE CBC W/AUTO DIFF WBC: CPT

## 2022-01-28 PROCEDURE — 71045 X-RAY EXAM CHEST 1 VIEW: CPT

## 2022-01-28 PROCEDURE — 93005 ELECTROCARDIOGRAM TRACING: CPT

## 2022-01-28 PROCEDURE — 96365 THER/PROPH/DIAG IV INF INIT: CPT

## 2022-01-28 PROCEDURE — C9113 INJ PANTOPRAZOLE SODIUM, VIA: HCPCS | Performed by: EMERGENCY MEDICINE

## 2022-01-28 PROCEDURE — 96361 HYDRATE IV INFUSION ADD-ON: CPT

## 2022-01-28 RX ORDER — KETOROLAC TROMETHAMINE 30 MG/ML
30 INJECTION, SOLUTION INTRAMUSCULAR; INTRAVENOUS
Status: COMPLETED | OUTPATIENT
Start: 2022-01-28 | End: 2022-01-28

## 2022-01-28 RX ORDER — POTASSIUM CHLORIDE 20 MEQ/1
40 TABLET, EXTENDED RELEASE ORAL
Status: COMPLETED | OUTPATIENT
Start: 2022-01-28 | End: 2022-01-28

## 2022-01-28 RX ORDER — ONDANSETRON 2 MG/ML
4 INJECTION INTRAMUSCULAR; INTRAVENOUS
Status: COMPLETED | OUTPATIENT
Start: 2022-01-28 | End: 2022-01-28

## 2022-01-28 RX ORDER — GUAIFENESIN 100 MG/5ML
162 LIQUID (ML) ORAL
Status: COMPLETED | OUTPATIENT
Start: 2022-01-28 | End: 2022-01-28

## 2022-01-28 RX ORDER — SODIUM CHLORIDE 9 MG/ML
200 INJECTION, SOLUTION INTRAVENOUS ONCE
Status: COMPLETED | OUTPATIENT
Start: 2022-01-28 | End: 2022-01-28

## 2022-01-28 RX ADMIN — ONDANSETRON 4 MG: 2 INJECTION INTRAMUSCULAR; INTRAVENOUS at 18:23

## 2022-01-28 RX ADMIN — SODIUM CHLORIDE 40 MG: 9 INJECTION, SOLUTION INTRAVENOUS at 18:23

## 2022-01-28 RX ADMIN — SODIUM CHLORIDE 6 UNITS/HR: 9 INJECTION, SOLUTION INTRAVENOUS at 20:13

## 2022-01-28 RX ADMIN — KETOROLAC TROMETHAMINE 30 MG: 30 INJECTION, SOLUTION INTRAMUSCULAR at 23:53

## 2022-01-28 RX ADMIN — ASPIRIN 81 MG 162 MG: 81 TABLET ORAL at 18:23

## 2022-01-28 RX ADMIN — SODIUM CHLORIDE 200 ML/HR: 9 INJECTION, SOLUTION INTRAVENOUS at 20:02

## 2022-01-28 RX ADMIN — SODIUM CHLORIDE 1000 ML: 9 INJECTION, SOLUTION INTRAVENOUS at 18:23

## 2022-01-28 RX ADMIN — INSULIN HUMAN 10 UNITS: 100 INJECTION, SOLUTION PARENTERAL at 19:12

## 2022-01-28 RX ADMIN — SODIUM CHLORIDE 1000 ML: 9 INJECTION, SOLUTION INTRAVENOUS at 19:12

## 2022-01-28 RX ADMIN — POTASSIUM CHLORIDE 40 MEQ: 1500 TABLET, EXTENDED RELEASE ORAL at 23:53

## 2022-01-28 NOTE — ED PROVIDER NOTES
EMERGENCY DEPARTMENT HISTORY AND PHYSICAL EXAM      Date: (Not on file)  Patient Name: Nick Senior    History of Presenting Illness     No chief complaint on file. History Provided By: Patient    HPI: Nick Senior, 46 y.o. male with a past medical history significant DM presents to the ED with cc of chest tighteness, nausea, vomiting, SOB , pain when he takes a deep breath, patient is constipated. He hasn't been to work in over a week. No other finding at this. No fevers of chills, or cough. PCP: Chente Morin MD    No current facility-administered medications on file prior to encounter. Current Outpatient Medications on File Prior to Encounter   Medication Sig Dispense Refill    Blood-Glucose Meter (Accu-Chek Chika Plus Meter) misc Check sugar twice a day 1 Each 0    glucose blood VI test strips (Accu-Chek Chika Plus test strp) strip Check Sugar twice a day 180 Strip 1    rosuvastatin (CRESTOR) 10 mg tablet Take 1 Tablet by mouth nightly. Indications: high cholesterol and high triglycerides 90 Tablet 1    lancets (Accu-Chek Multiclix Lancet) misc Use twice a day to check sugars 200 Each 1    metFORMIN (GLUCOPHAGE) 1,000 mg tablet Take 1 tablet by mouth twice daily  Indications: type 2 diabetes mellitus 180 Tablet 1    ibuprofen (MOTRIN) 600 mg tablet Take 1 Tablet by mouth every six (6) hours as needed for Pain. 20 Tablet 0    insulin NPH/insulin regular (NovoLIN 70/30 U-100 Insulin) 100 unit/mL (70-30) injection 26 Units by SubCUTAneous route two (2) times a day.  Indications: type 2 diabetes mellitus 4680 Units 1       Past History     Past Medical History:  Past Medical History:   Diagnosis Date    Diabetes mellitus ([address] 12/16/2019       Past Surgical History:  Past Surgical History:   Procedure Laterality Date    HX APPENDECTOMY         Family History:  Family History   Problem Relation Age of Onset    Diabetes Maternal Grandmother     Diabetes Paternal Grandmother    Chaim Lacy Hypertension Paternal Grandmother        Social History:  Social History     Tobacco Use    Smoking status: Never Smoker    Smokeless tobacco: Never Used   Substance Use Topics    Alcohol use: Yes     Comment: Very rarely.  Drug use: Not Currently       Allergies:  No Known Allergies      Review of Systems     Review of Systems   Constitutional: Negative. HENT: Negative. Eyes: Negative. Respiratory: Positive for cough and shortness of breath. Cardiovascular: Positive for chest pain. Gastrointestinal: Positive for abdominal distention, constipation, nausea and vomiting. Endocrine: Negative. Genitourinary: Negative. Musculoskeletal: Negative. Skin: Negative. Allergic/Immunologic: Negative. Neurological: Negative. Hematological: Negative. Psychiatric/Behavioral: Negative. All other systems reviewed and are negative. Physical Exam     Physical Exam  Vitals and nursing note reviewed. Constitutional:       Appearance: Normal appearance. HENT:      Head: Normocephalic. Nose: Nose normal.      Mouth/Throat:      Mouth: Mucous membranes are moist.   Eyes:      Pupils: Pupils are equal, round, and reactive to light. Cardiovascular:      Rate and Rhythm: Normal rate and regular rhythm. Pulmonary:      Effort: Pulmonary effort is normal.      Breath sounds: No decreased breath sounds, wheezing, rhonchi or rales. Chest:      Chest wall: No mass, deformity, tenderness, crepitus or edema. There is no dullness to percussion. Abdominal:      General: Abdomen is flat. Bowel sounds are normal.      Palpations: Abdomen is soft. Musculoskeletal:         General: Tenderness present. Normal range of motion. Cervical back: Normal range of motion. Comments: Tenderness on the right side   Skin:     General: Skin is warm. Neurological:      General: No focal deficit present. Mental Status: He is alert and oriented to person, place, and time.    Psychiatric: Mood and Affect: Mood normal.         Lab and Diagnostic Study Results     Labs -   No results found for this or any previous visit (from the past 12 hour(s)). Radiologic Studies -   @lastxrresult@  CT Results  (Last 48 hours)    None        CXR Results  (Last 48 hours)    None            Medical Decision Making   - I am the first provider for this patient. - I reviewed the vital signs, available nursing notes, past medical history, past surgical history, family history and social history. - Initial assessment performed. The patients presenting problems have been discussed, and they are in agreement with the care plan formulated and outlined with them. I have encouraged them to ask questions as they arise throughout their visit. Vital Signs-Reviewed the patient's vital signs. No data found. Records Reviewed: Nursing Notes    The patient presents with abdominal pain with a differential diagnosis of abdominal pain, appendicitis, biliary colic, cholecystitis, diverticulitis, gastritis, gastroenteritis, GERD, obstruction, pancreatitis, PUD, renal Colic, UTI and vomiting      ED Course:          Provider Notes (Medical Decision Making): MDM       Procedures   Medical Decision Makingedical Decision Making  Performed by: Deepthi Abreu MD  PROCEDURESProcedures       Disposition   Disposition: Condition stable        DISCHARGE PLAN:  1.    Current Discharge Medication List      CONTINUE these medications which have NOT CHANGED    Details   Blood-Glucose Meter (Accu-Chek Chika Plus Meter) misc Check sugar twice a day  Qty: 1 Each, Refills: 0    Associated Diagnoses: Type 2 diabetes mellitus without complication, with long-term current use of insulin ([facility])      glucose blood VI test strips (Accu-Chek Chika Plus test strp) strip Check Sugar twice a day  Qty: 180 Strip, Refills: 1    Associated Diagnoses: Type 2 diabetes mellitus without complication, with long-term current use of insulin ([facility]) rosuvastatin (CRESTOR) 10 mg tablet Take 1 Tablet by mouth nightly. Indications: high cholesterol and high triglycerides  Qty: 90 Tablet, Refills: 1    Associated Diagnoses: Hyperlipidemia associated with type 2 diabetes mellitus (HCC)      lancets (Accu-Chek Multiclix Lancet) misc Use twice a day to check sugars  Qty: 200 Each, Refills: 1    Comments: Needs lancing device DX E11.9 NPI [id number]  Associated Diagnoses: Type 2 diabetes mellitus without complication, with long-term current use of insulin ([facility])      metFORMIN (GLUCOPHAGE) 1,000 mg tablet Take 1 tablet by mouth twice daily  Indications: type 2 diabetes mellitus  Qty: 180 Tablet, Refills: 1    Associated Diagnoses: Type 2 diabetes mellitus without complication, with long-term current use of insulin ([facility])      ibuprofen (MOTRIN) 600 mg tablet Take 1 Tablet by mouth every six (6) hours as needed for Pain. Qty: 20 Tablet, Refills: 0      insulin NPH/insulin regular (NovoLIN 70/30 U-100 Insulin) 100 unit/mL (70-30) injection 26 Units by SubCUTAneous route two (2) times a day. Indications: type 2 diabetes mellitus  Qty: 4680 Units, Refills: 1    Associated Diagnoses: Type 2 diabetes mellitus without complication, with long-term current use of insulin ([facility] [address]           2. Follow-up Information    None       3. Return to ED if worse   4. Current Discharge Medication List            Diagnosis     Clinical Impression:     ICD-10-CM ICD-9-CM    1. Malaise and fatigue  R53.81 780.79     R53.83     2. Non-intractable vomiting with nausea, unspecified vomiting type  R11.2 787.01         Anastacia Velasquez MD    Please note that this dictation was completed with [employer], the [employer] voice recognition software. Quite often unanticipated grammatical, syntax, homophones, and other interpretive errors are inadvertently transcribed by the computer software. Please disregard these errors. Please excuse any errors that have escaped final proofreading.   Thank you.

## 2022-01-28 NOTE — ED TRIAGE NOTES
.  Chief Complaint   Patient presents with    Shortness of Breath     pt presents with c/o chest tightness and SOB with fatigue and dizziness that has been ongoing x 1 week. Pt states that he has also had N/V that started at 0300 today. Pt 99% on room air, and afebrile in triage.      Fatigue    Nausea    Vomiting    Chest Pain

## 2022-01-29 VITALS
OXYGEN SATURATION: 100 % | HEART RATE: 79 BPM | RESPIRATION RATE: 16 BRPM | WEIGHT: 180 LBS | BODY MASS INDEX: 28.93 KG/M2 | HEIGHT: 66 IN | SYSTOLIC BLOOD PRESSURE: 111 MMHG | TEMPERATURE: 97.6 F | DIASTOLIC BLOOD PRESSURE: 69 MMHG

## 2022-01-29 LAB
ANION GAP SERPL CALC-SCNC: 13 MMOL/L (ref 5–15)
BUN SERPL-MCNC: 11 MG/DL (ref 6–20)
BUN/CREAT SERPL: 11 (ref 12–20)
CA-I BLD-MCNC: 8.5 MG/DL (ref 8.5–10.1)
CHLORIDE SERPL-SCNC: 103 MMOL/L (ref 97–108)
CO2 SERPL-SCNC: 21 MMOL/L (ref 21–32)
CREAT SERPL-MCNC: 1 MG/DL (ref 0.7–1.3)
GLUCOSE BLD STRIP.AUTO-MCNC: 283 MG/DL (ref 65–117)
GLUCOSE BLD STRIP.AUTO-MCNC: 346 MG/DL (ref 65–117)
GLUCOSE SERPL-MCNC: 314 MG/DL (ref 65–100)
PERFORMED BY, TECHID: ABNORMAL
PERFORMED BY, TECHID: ABNORMAL
POTASSIUM SERPL-SCNC: 4.5 MMOL/L (ref 3.5–5.1)
SODIUM SERPL-SCNC: 137 MMOL/L (ref 136–145)

## 2022-01-29 PROCEDURE — 80048 BASIC METABOLIC PNL TOTAL CA: CPT

## 2022-01-29 PROCEDURE — 74011250636 HC RX REV CODE- 250/636: Performed by: EMERGENCY MEDICINE

## 2022-01-29 PROCEDURE — 82962 GLUCOSE BLOOD TEST: CPT

## 2022-01-29 PROCEDURE — 74011636637 HC RX REV CODE- 636/637: Performed by: EMERGENCY MEDICINE

## 2022-01-29 RX ORDER — SODIUM CHLORIDE 9 MG/ML
150 INJECTION, SOLUTION INTRAVENOUS ONCE
Status: COMPLETED | OUTPATIENT
Start: 2022-01-29 | End: 2022-01-29

## 2022-01-29 RX ADMIN — SODIUM CHLORIDE 150 ML/HR: 9 INJECTION, SOLUTION INTRAVENOUS at 02:31

## 2022-01-29 RX ADMIN — INSULIN HUMAN 10 UNITS: 100 INJECTION, SOLUTION PARENTERAL at 06:24

## 2022-01-29 RX ADMIN — INSULIN HUMAN 6 UNITS: 100 INJECTION, SOLUTION PARENTERAL at 02:34

## 2022-01-29 NOTE — ED NOTES
Green top drawn left [facility] without difficulty.  No need to do CBG this time due to lab draw

## 2022-01-29 NOTE — ED NOTES
Dr Sravan Powell aware of blood sugar, reviewed chemistry, no orders for potassium at this time. Will consider oral K.

## 2022-01-29 NOTE — ED NOTES
Pt complaining of chest discomfort, similar to when he first came in. Dr Jono Wang aware and ordered toradol IV. Pt medicated and will ring if pain worse. Lights out, pillow given, will try and rest. Dr Jono Wang ordered blood sugar checks every hour.

## 2022-01-29 NOTE — ED NOTES
Discharge instructions reviewed with patient. Stressed importance of taking regular insulin dose when he gets home and then eats.  Return precautions reviewed

## 2022-01-29 NOTE — ED NOTES
Dr Obed Coleman aware of CBG of 421, to continue insulin drip at 6 units and NS at 200 mls/hr. Does not want repeat labs at this time. [facility] to accept to step down unit.

## 2022-01-29 NOTE — ED PROVIDER NOTES
HPI     Past Medical History:   Diagnosis Date    Diabetes mellitus ([facility] [address] 12/16/2019       Past Surgical History:   Procedure Laterality Date    HX APPENDECTOMY           Family History:   Problem Relation Age of Onset    Diabetes Maternal Grandmother     Diabetes Paternal Grandmother     Hypertension Paternal Grandmother        Social History     Socioeconomic History    Marital status: SINGLE     Spouse name: Not on file    Number of children: Not on file    Years of education: Not on file    Highest education level: Not on file   Occupational History    Not on file   Tobacco Use    Smoking status: Never Smoker    Smokeless tobacco: Never Used   Substance and Sexual Activity    Alcohol use: Yes     Comment: Very rarely.  Drug use: Not Currently    Sexual activity: Not on file   Other Topics Concern    Not on file   Social History Narrative    Not on file     Social Determinants of Health     Financial Resource Strain:     Difficulty of Paying Living Expenses: Not on file   Food Insecurity:     Worried About Running Out of Food in the Last Year: Not on file    Nelly of Food in the Last Year: Not on file   Transportation Needs:     Lack of Transportation (Medical): Not on file    Lack of Transportation (Non-Medical):  Not on file   Physical Activity:     Days of Exercise per Week: Not on file    Minutes of Exercise per Session: Not on file   Stress:     Feeling of Stress : Not on file   Social Connections:     Frequency of Communication with Friends and Family: Not on file    Frequency of Social Gatherings with Friends and Family: Not on file    Attends [employer] Services: Not on file    Active Member of Clubs or Organizations: Not on file    Attends Club or Organization Meetings: Not on file    Marital Status: Not on file   Intimate Partner Violence:     Fear of Current or Ex-Partner: Not on file    Emotionally Abused: Not on file    Physically Abused: Not on file   Verl Raw Sexually Reduced in the emergency department  NWB MONICA in sling  CT Shoulder completed: follow-up ortho plan   Pain control  PT/OT - awaiting rehab placement Abused: Not on file   Housing Stability:     Unable to Pay for Housing in the Last Year: Not on file    Number of Shreyamovincent in the Last Year: Not on file    Unstable Housing in the Last Year: Not on file         ALLERGIES: Patient has no known allergies.     Review of Systems    Vitals:    01/28/22 1755 01/28/22 1916 01/28/22 2009   BP: (!) 131/96 (!) 145/82 122/82   Pulse: (!) 125 (!) 119 (!) 104   Resp: 20 16 24   Temp: 97.6 °F (36.4 °C)     SpO2: 99% 99% 100%   Weight: 81.6 kg (180 lb)     Height: 5' 6\" (1.676 m)              Physical Exam     MDM  Number of Diagnoses or Management Options  Risk of Complications, Morbidity, and/or Mortality  Presenting problems: high  Diagnostic procedures: high  Management options: high  General comments: Discussed with Dr Geneva El - Hospitalist at [facility] and she accepts patient as a transfer    Patient Progress  Patient progress: improved         Procedures

## 2022-01-31 LAB
ATRIAL RATE: 122 BPM
CALCULATED P AXIS, ECG09: 43 DEGREES
CALCULATED R AXIS, ECG10: -28 DEGREES
CALCULATED T AXIS, ECG11: 56 DEGREES
DIAGNOSIS, 93000: NORMAL
P-R INTERVAL, ECG05: 141 MS
Q-T INTERVAL, ECG07: 339 MS
QRS DURATION, ECG06: 96 MS
QTC CALCULATION (BEZET), ECG08: 485 MS
VENTRICULAR RATE, ECG03: 123 BPM

## 2022-03-16 DIAGNOSIS — E11.9 TYPE 2 DIABETES MELLITUS WITHOUT COMPLICATION, WITH LONG-TERM CURRENT USE OF INSULIN (HCC): ICD-10-CM

## 2022-03-16 DIAGNOSIS — Z79.4 TYPE 2 DIABETES MELLITUS WITHOUT COMPLICATION, WITH LONG-TERM CURRENT USE OF INSULIN (HCC): ICD-10-CM

## 2022-03-16 RX ORDER — BLOOD-GLUCOSE METER
EACH MISCELLANEOUS
Qty: 1 EACH | Refills: 0 | Status: SHIPPED | OUTPATIENT
Start: 2022-03-16 | End: 2022-05-20 | Stop reason: ALTCHOICE

## 2022-03-16 RX ORDER — METFORMIN HYDROCHLORIDE 1000 MG/1
TABLET ORAL
Qty: 180 TABLET | Refills: 1 | Status: SHIPPED | OUTPATIENT
Start: 2022-03-16 | End: 2022-03-29 | Stop reason: SDUPTHER

## 2022-03-16 RX ORDER — IBUPROFEN 600 MG/1
600 TABLET ORAL
Qty: 20 TABLET | Refills: 0 | Status: SHIPPED | OUTPATIENT
Start: 2022-03-16 | End: 2022-06-29

## 2022-03-16 NOTE — TELEPHONE ENCOUNTER
Requested Prescriptions     Pending Prescriptions Disp Refills    Blood-Glucose Meter (Accu-Chek Chika Plus Meter) misc 1 Each 0     Sig: Check sugar twice a day    metFORMIN (GLUCOPHAGE) 1,000 mg tablet 180 Tablet 1     Sig: Take 1 tablet by mouth twice daily  Indications: type 2 diabetes mellitus    ibuprofen (MOTRIN) 600 mg tablet 20 Tablet 0     Sig: Take 1 Tablet by mouth every six (6) hours as needed for Pain.

## 2022-03-18 PROBLEM — E78.5 HYPERLIPIDEMIA ASSOCIATED WITH TYPE 2 DIABETES MELLITUS (HCC): Status: ACTIVE | Noted: 2021-10-25

## 2022-03-18 PROBLEM — E11.69 HYPERLIPIDEMIA ASSOCIATED WITH TYPE 2 DIABETES MELLITUS (HCC): Status: ACTIVE | Noted: 2021-10-25

## 2022-03-19 PROBLEM — E11.9 DIABETES MELLITUS (HCC): Status: ACTIVE | Noted: 2019-12-16

## 2022-03-29 ENCOUNTER — OFFICE VISIT (OUTPATIENT)
Dept: ENDOCRINOLOGY | Age: 42
End: 2022-03-29
Payer: COMMERCIAL

## 2022-03-29 VITALS
SYSTOLIC BLOOD PRESSURE: 115 MMHG | DIASTOLIC BLOOD PRESSURE: 76 MMHG | WEIGHT: 174 LBS | BODY MASS INDEX: 27.97 KG/M2 | TEMPERATURE: 97.9 F | HEIGHT: 66 IN | HEART RATE: 87 BPM | RESPIRATION RATE: 19 BRPM | OXYGEN SATURATION: 98 %

## 2022-03-29 DIAGNOSIS — E11.65 UNCONTROLLED TYPE 2 DIABETES MELLITUS WITH HYPERGLYCEMIA (HCC): ICD-10-CM

## 2022-03-29 DIAGNOSIS — E78.2 MIXED HYPERLIPIDEMIA: ICD-10-CM

## 2022-03-29 DIAGNOSIS — Z79.4 TYPE 2 DIABETES MELLITUS WITHOUT COMPLICATION, WITH LONG-TERM CURRENT USE OF INSULIN (HCC): Primary | ICD-10-CM

## 2022-03-29 DIAGNOSIS — E11.9 TYPE 2 DIABETES MELLITUS WITHOUT COMPLICATION, WITH LONG-TERM CURRENT USE OF INSULIN (HCC): Primary | ICD-10-CM

## 2022-03-29 LAB
BILIRUB UR QL STRIP: NEGATIVE
GLUCOSE POC: 344 MG/DL
GLUCOSE UR-MCNC: NORMAL MG/DL
HBA1C MFR BLD HPLC: 14 %
KETONES P FAST UR STRIP-MCNC: NORMAL MG/DL
PH UR STRIP: 7 [PH] (ref 4.6–8)
PROT UR QL STRIP: NEGATIVE
SP GR UR STRIP: 1.01 (ref 1–1.03)
UA UROBILINOGEN AMB POC: NORMAL (ref 0.2–1)
URINALYSIS CLARITY POC: NORMAL
URINALYSIS COLOR POC: NORMAL
URINE BLOOD POC: NEGATIVE
URINE LEUKOCYTES POC: NEGATIVE
URINE NITRITES POC: NEGATIVE

## 2022-03-29 PROCEDURE — 82962 GLUCOSE BLOOD TEST: CPT | Performed by: INTERNAL MEDICINE

## 2022-03-29 PROCEDURE — 81003 URINALYSIS AUTO W/O SCOPE: CPT | Performed by: INTERNAL MEDICINE

## 2022-03-29 PROCEDURE — 99204 OFFICE O/P NEW MOD 45 MIN: CPT | Performed by: INTERNAL MEDICINE

## 2022-03-29 PROCEDURE — 3046F HEMOGLOBIN A1C LEVEL >9.0%: CPT | Performed by: INTERNAL MEDICINE

## 2022-03-29 PROCEDURE — 83036 HEMOGLOBIN GLYCOSYLATED A1C: CPT | Performed by: INTERNAL MEDICINE

## 2022-03-29 RX ORDER — PEN NEEDLE, DIABETIC 30 GX3/16"
NEEDLE, DISPOSABLE MISCELLANEOUS
Qty: 100 EACH | Refills: 5 | Status: SHIPPED | OUTPATIENT
Start: 2022-03-29 | End: 2022-06-29 | Stop reason: SDUPTHER

## 2022-03-29 RX ORDER — BLOOD SUGAR DIAGNOSTIC
STRIP MISCELLANEOUS
Qty: 100 STRIP | Refills: 5 | Status: SHIPPED | OUTPATIENT
Start: 2022-03-29 | End: 2022-08-16 | Stop reason: CLARIF

## 2022-03-29 RX ORDER — METFORMIN HYDROCHLORIDE 1000 MG/1
TABLET ORAL
Qty: 180 TABLET | Refills: 1 | Status: SHIPPED | OUTPATIENT
Start: 2022-03-29 | End: 2022-06-29 | Stop reason: SDUPTHER

## 2022-03-29 RX ORDER — LANCETS
EACH MISCELLANEOUS
Qty: 100 EACH | Refills: 5 | Status: SHIPPED | OUTPATIENT
Start: 2022-03-29 | End: 2022-08-16 | Stop reason: CLARIF

## 2022-03-29 RX ORDER — PIOGLITAZONEHYDROCHLORIDE 30 MG/1
30 TABLET ORAL DAILY
Qty: 30 TABLET | Refills: 3 | Status: SHIPPED | OUTPATIENT
Start: 2022-03-29 | End: 2022-04-28

## 2022-03-29 RX ORDER — INSULIN GLARGINE 100 [IU]/ML
INJECTION, SOLUTION SUBCUTANEOUS
Qty: 12 ML | Refills: 3 | Status: SHIPPED | OUTPATIENT
Start: 2022-03-29 | End: 2022-06-29 | Stop reason: SDUPTHER

## 2022-03-29 NOTE — LETTER
3/29/2022    Patient: Luis Salas   YOB: 1980   Date of Visit: 3/29/2022     Jose Banks NP  [id number] [facility]  Via In Basket    Dear Jose Banks NP,      Thank you for referring Mr. Luis Salas to [address] [place] [address] for evaluation. My notes for this consultation are attached. If you have questions, please do not hesitate to call me. I look forward to following your patient along with you.       Sincerely,    Birdie Mcgraw MD

## 2022-03-29 NOTE — PROGRESS NOTES
History and Physical    Patient: Denise Barros MRN: [id number]  SSN: [id number]    YOB: 1980  Age: 46 y.o. Sex: male      Subjective:      Denise Barros is a 46 y.o. male with past medical history of hyperlipidemia is sent to me by primary care provider Derrick Maher NP for type 2 diabetes mellitus. Patient was diagnosed with diabetes in November 2019 when he was having polyuria, polydipsia, fatigue and unintentional weight loss. He is currently on Metformin 1000 mg twice a day, Novolin 70/30, 26 units twice a day. Patient frequently forgets to take his medications. He tells me that he checks the glucose 1-2 times per day. Did not bring glucometer today. He tells me all his readings are high. Never has hypoglycemia. He eats 3 meals per day, drinks juice, soda, sweet tea. Up-to-date with diabetic eye exam.  He is having numbness and burning at the bottom of both feet. He also has polyuria, polydipsia and unintentional weight loss.     Glucometer reading: Did not bring glucometer today    · Diagnosis:   · Current treatment: metformin 1000 mg bid, Novolin 70/30 26 units bid  · Past treatment: none  · Glucose checks: 1-2 a day  · Hyperglycemia: yes  · Hypoglycemia: no  · Meals per day: 3, breakfast: fast food, coffee, lunch: skips if he is at work, or may eat eggs, etc, dinner: protein and potatoes and vegetables, snacks: nuts, chips, juice, soda, sweet tea  · Exercise: squats etc. Few times a week  · DM related hospitalizations: yes    Smoking: no  Family history of coronary artery disease/stroke: no    Complications of DM:  · CAD: no  · CVA: no  · PVD: no  · Amputations: no   · Retinopathy: no; last exam was 6-2021  · Gastropathy: no  · Nephropathy: no  · Neuropathy: yes  Sees podiatrist:    Medications:  · Statin: rosuvastatin 10 mg  · ACE-I: no  · ASA: no    · Diabetes education: no    Past Medical History:   Diagnosis Date    [facility]     Past Surgical History:   Procedure Laterality Date    HX APPENDECTOMY        Family History   Problem Relation Age of Onset    Diabetes Maternal Grandmother     Diabetes Paternal Grandmother     Hypertension Paternal Grandmother      Social History     Tobacco Use    Smoking status: Never Smoker    Smokeless tobacco: Never Used   Substance Use Topics    Alcohol use: Yes     Comment: Very rarely. Prior to Admission medications    Medication Sig Start Date End Date Taking? Authorizing Provider   insulin glargine (LANTUS,BASAGLAR) 100 unit/mL (3 mL) inpn Inject 40 units every morning 3/29/22  Yes Jenifer Eugene MD   pioglitazone (ACTOS) 30 mg tablet Take 1 Tablet by mouth daily for 30 days. 3/29/22 4/28/22 Yes Jenifer Eugene MD   metFORMIN (GLUCOPHAGE) 1,000 mg tablet Take 1 tablet by mouth twice daily  Indications: type 2 diabetes mellitus 3/29/22  Yes Jenifer Eugene MD   glucose blood VI test strips (Accu-Chek Chika Plus test strp) strip Check Sugar twice a day 3/29/22  Yes Jenifer Eugene MD   lancets (Accu-Chek Multiclix Lancet) misc Use twice a day to check sugars 3/29/22  Yes Jenifer Eugene MD   Insulin Needles, Disposable, 31 gauge x 5/16\" ndle Once a day 3/29/22  Yes Jenifer Eugene MD   Blood-Glucose Meter (Accu-Chek Chika Plus Meter) misc Check sugar twice a day 3/16/22  Yes Karen Nascimento NP   ibuprofen (MOTRIN) 600 mg tablet Take 1 Tablet by mouth every six (6) hours as needed for Pain. 3/16/22  Yes Karen Nascimento NP   rosuvastatin (CRESTOR) 10 mg tablet Take 1 Tablet by mouth nightly.  Indications: high cholesterol and high triglycerides 10/25/21  Yes Ivonne Fischer MD        No Known Allergies    Review of Systems:  ROS    A comprehensive review of systems was preformed and it is negative except mentioned in HPI    Objective:     Vitals:    03/29/22 1017   BP: 115/76   Pulse: 87   Resp: 19   Temp: 97.9 °F (36.6 °C)   TempSrc: Oral   SpO2: 98%   Weight: 174 lb (78.9 kg)   Height: 5' 6\" (1.676 m) Physical Exam:    Physical Exam  Vitals and nursing note reviewed. Constitutional:       Appearance: Normal appearance. HENT:      Head: Normocephalic and atraumatic. Eyes:      Extraocular Movements: Extraocular movements intact. Pupils: Pupils are equal, round, and reactive to light. Cardiovascular:      Rate and Rhythm: Normal rate and regular rhythm. Pulmonary:      Effort: Pulmonary effort is normal.      Breath sounds: Normal breath sounds. Musculoskeletal:         General: Normal range of motion. Cervical back: Neck supple. Skin:     General: Skin is warm. Neurological:      General: No focal deficit present. Mental Status: He is alert and oriented to person, place, and time. Psychiatric:         Mood and Affect: Mood normal.         Behavior: Behavior normal.       diabetic foot exam:  Bilateral diabetic foot exam was performed today. Dorsalis pedis pulses 2+ bilaterally. Monofilament sensation normal bilaterally. No ulcers or skin breakdown, bilateral dry peeling skin bottom of both feet     Labs and Imaging:    Last 3 Recorded Weights in this Encounter    03/29/22 1017   Weight: 174 lb (78.9 kg)        Lab Results   Component Value Date/Time    Hemoglobin A1c >15.5 (H) 10/26/2021 10:39 AM    Hemoglobin A1c >15.5 (H) 12/03/2020 02:30 PM        Assessment:     Patient Active Problem List   Diagnosis Code    Diabetes mellitus ([facility] [address] E11.9    Hyperlipidemia associated with type 2 diabetes mellitus ([facility] [address] E11.69, E78.5    Mixed hyperlipidemia E78.2           Plan:     Type 2 diabetes mellitus, uncontrolled:  I reviewed progress note and labs from the referring provider's office. Hemoglobin A1c was >15.5% on 12-3-2020, >15.5% on 10-, >14% today. Fingerstick blood glucose is  344 mg/dL in my office today.   Urine is showing moderate ketones  Up to date with diabetes related annual labs: October 2021 except TSH  Up to date with diabetic eye exam: June 2021  Plan:  Discussed with patient importance of controlling diabetes to prevent endorgan damage. We had a detailed discussion about how diabetes can worsen his quality of life or cause death. Patient is motivated to make changes. For compliance I am going to switch him from Novolin 70/30 to Lantus 40 units in the morning. Demonstrated to patient how to use insulin pen. Continue Metformin 1000 mg twice a day and start pioglitazone 30 mg daily. Check blood glucose twice a day every day and bring glucometer to next visit in 3 months. Discussed with patient about healthy snacking, avoiding all juice and sugary beverages. I will avoid SGLT2 inhibitors because of ketosis prone diabetes. Essential hypertension:  Not on blood pressure medications anymore, blood pressure continues to be normal.  No microalbuminuria, last checked in October 2021. Mixed hyperlipidemia:  10-: Total cholesterol 137, triglycerides 135, LDL 63. Continue rosuvastatin 10 mg at bedtime. Orders Placed This Encounter    AMB POC GLUCOSE BLOOD, BY GLUCOSE MONITORING DEVICE    AMB POC HEMOGLOBIN A1C    AMB POC URINALYSIS DIP STICK AUTO W/O MICRO    insulin glargine (LANTUS,BASAGLAR) 100 unit/mL (3 mL) inpn     Sig: Inject 40 units every morning     Dispense:  12 mL     Refill:  3     DC Novolin 70/30    pioglitazone (ACTOS) 30 mg tablet     Sig: Take 1 Tablet by mouth daily for 30 days.      Dispense:  30 Tablet     Refill:  3    metFORMIN (GLUCOPHAGE) 1,000 mg tablet     Sig: Take 1 tablet by mouth twice daily  Indications: type 2 diabetes mellitus     Dispense:  180 Tablet     Refill:  1    glucose blood VI test strips (Accu-Chek Chika Plus test strp) strip     Sig: Check Sugar twice a day     Dispense:  100 Strip     Refill:  5    lancets (Accu-Chek Multiclix Lancet) misc     Sig: Use twice a day to check sugars     Dispense:  100 Each     Refill:  5     Needs lancing device DX E11.9 NPI [id number]    Insulin Needles, Disposable, 31 gauge x 5/16\" ndle     Sig: Once a day     Dispense:  100 Each     Refill:  5        Signed By: Liv Almeida MD     March 29, 2022      Return to clinic 3 months

## 2022-03-29 NOTE — PROGRESS NOTES
1. \"Have you been to the ER, urgent care clinic since your last visit? Hospitalized since your last visit? \" Yes When: jan 2022 Where: [facility] Reason for visit: shortness of breath    2. \"Have you seen or consulted any other health care providers outside of the [address] [place] [address] since your last visit? \" No     3. For patients aged 39-70: Has the patient had a colonoscopy / FIT/ Cologuard? NA - based on age      If the patient is female:    4. For patients aged 41-77: Has the patient had a mammogram within the past 2 years? NA - based on age or sex      11. For patients aged 21-65: Has the patient had a pap smear?  NA - based on age or sex

## 2022-04-01 DIAGNOSIS — Z79.4 TYPE 2 DIABETES MELLITUS WITHOUT COMPLICATION, WITH LONG-TERM CURRENT USE OF INSULIN (HCC): Primary | ICD-10-CM

## 2022-04-01 DIAGNOSIS — E11.9 TYPE 2 DIABETES MELLITUS WITHOUT COMPLICATION, WITH LONG-TERM CURRENT USE OF INSULIN (HCC): Primary | ICD-10-CM

## 2022-04-01 RX ORDER — BLOOD SUGAR DIAGNOSTIC
STRIP MISCELLANEOUS
Qty: 100 STRIP | Refills: 5 | Status: SHIPPED | OUTPATIENT
Start: 2022-04-01 | End: 2022-05-20 | Stop reason: ALTCHOICE

## 2022-04-01 RX ORDER — INSULIN PUMP SYRINGE, 3 ML
EACH MISCELLANEOUS
Qty: 1 KIT | Refills: 0 | Status: SHIPPED | OUTPATIENT
Start: 2022-04-01 | End: 2022-05-20 | Stop reason: ALTCHOICE

## 2022-04-01 RX ORDER — LANCETS
EACH MISCELLANEOUS
Qty: 100 EACH | Refills: 5 | Status: SHIPPED | OUTPATIENT
Start: 2022-04-01 | End: 2022-05-20 | Stop reason: ALTCHOICE

## 2022-04-29 ENCOUNTER — HOSPITAL ENCOUNTER (EMERGENCY)
Age: 42
Discharge: HOME | End: 2022-04-29
Attending: EMERGENCY MEDICINE
Payer: COMMERCIAL

## 2022-04-29 VITALS
BODY MASS INDEX: 28.61 KG/M2 | SYSTOLIC BLOOD PRESSURE: 141 MMHG | DIASTOLIC BLOOD PRESSURE: 101 MMHG | OXYGEN SATURATION: 99 % | WEIGHT: 178 LBS | HEIGHT: 66 IN | TEMPERATURE: 98.2 F | RESPIRATION RATE: 18 BRPM | HEART RATE: 81 BPM

## 2022-04-29 DIAGNOSIS — R07.89 ATYPICAL CHEST PAIN: Primary | ICD-10-CM

## 2022-04-29 LAB
ALBUMIN SERPL-MCNC: 3.7 G/DL (ref 3.5–5)
ALBUMIN/GLOB SERPL: 1.2 {RATIO} (ref 1.1–2.2)
ALP SERPL-CCNC: 81 U/L (ref 45–117)
ALT SERPL-CCNC: 24 U/L (ref 12–78)
ANION GAP SERPL CALC-SCNC: 6 MMOL/L (ref 5–15)
AST SERPL W P-5'-P-CCNC: 12 U/L (ref 15–37)
ATRIAL RATE: 76 BPM
BASOPHILS # BLD: 0 K/UL (ref 0–0.2)
BASOPHILS NFR BLD: 1 % (ref 0–2.5)
BILIRUB SERPL-MCNC: 2 MG/DL (ref 0.2–1)
BUN SERPL-MCNC: 9 MG/DL (ref 6–20)
BUN/CREAT SERPL: 16 (ref 12–20)
CA-I BLD-MCNC: 8.8 MG/DL (ref 8.5–10.1)
CALCULATED P AXIS, ECG09: 0 DEGREES
CALCULATED R AXIS, ECG10: -13 DEGREES
CALCULATED T AXIS, ECG11: 26 DEGREES
CHLORIDE SERPL-SCNC: 104 MMOL/L (ref 97–108)
CO2 SERPL-SCNC: 29 MMOL/L (ref 21–32)
CREAT SERPL-MCNC: 0.56 MG/DL (ref 0.7–1.3)
DIAGNOSIS, 93000: NORMAL
EOSINOPHIL # BLD: 0.4 K/UL (ref 0–0.7)
EOSINOPHIL NFR BLD: 7 % (ref 0.9–2.9)
ERYTHROCYTE [DISTWIDTH] IN BLOOD BY AUTOMATED COUNT: 13.5 % (ref 11.5–14.5)
GLOBULIN SER CALC-MCNC: 3.2 G/DL (ref 2–4)
GLUCOSE SERPL-MCNC: 162 MG/DL (ref 65–100)
HCT VFR BLD AUTO: 38.9 % (ref 41–53)
HGB BLD-MCNC: 12.9 G/DL (ref 13.5–17.5)
LYMPHOCYTES # BLD: 1.9 K/UL (ref 1–4.8)
LYMPHOCYTES NFR BLD: 34 % (ref 20.5–51.1)
MCH RBC QN AUTO: 29.6 PG (ref 31–34)
MCHC RBC AUTO-ENTMCNC: 33.2 G/DL (ref 31–36)
MCV RBC AUTO: 89.2 FL (ref 80–100)
MONOCYTES # BLD: 0.3 K/UL (ref 0.2–2.4)
MONOCYTES NFR BLD: 5 % (ref 1.7–9.3)
NEUTS SEG # BLD: 3 K/UL (ref 1.8–7.7)
NEUTS SEG NFR BLD: 53 % (ref 42–75)
NRBC # BLD: 0 K/UL
NRBC BLD-RTO: 0.1 PER 100 WBC
P-R INTERVAL, ECG05: 143 MS
PLATELET # BLD AUTO: 206 K/UL (ref 150–400)
PMV BLD AUTO: 8.1 FL (ref 6.5–11.5)
POTASSIUM SERPL-SCNC: 3.6 MMOL/L (ref 3.5–5.1)
PROT SERPL-MCNC: 6.9 G/DL (ref 6.4–8.2)
Q-T INTERVAL, ECG07: 394 MS
QRS DURATION, ECG06: 89 MS
QTC CALCULATION (BEZET), ECG08: 446 MS
RBC # BLD AUTO: 4.36 M/UL (ref 4.5–5.9)
SODIUM SERPL-SCNC: 139 MMOL/L (ref 136–145)
TROPONIN-HIGH SENSITIVITY: 25 NG/L (ref 0–76)
TROPONIN-HIGH SENSITIVITY: 29 NG/L (ref 0–76)
VENTRICULAR RATE, ECG03: 77 BPM
WBC # BLD AUTO: 5.6 K/UL (ref 4.4–11.3)

## 2022-04-29 PROCEDURE — 36415 COLL VENOUS BLD VENIPUNCTURE: CPT

## 2022-04-29 PROCEDURE — 80053 COMPREHEN METABOLIC PANEL: CPT

## 2022-04-29 PROCEDURE — 93005 ELECTROCARDIOGRAM TRACING: CPT

## 2022-04-29 PROCEDURE — 84484 ASSAY OF TROPONIN QUANT: CPT

## 2022-04-29 PROCEDURE — 99284 EMERGENCY DEPT VISIT MOD MDM: CPT

## 2022-04-29 PROCEDURE — 85025 COMPLETE CBC W/AUTO DIFF WBC: CPT

## 2022-04-29 NOTE — ED TRIAGE NOTES
Patient reports left sided chest pain that started 2 weeks ago when he his diabetic medication got changed. Patient recently started taking pioglitazone 30mg daily in addition to his metformin & lantus. Pain does not radiate. Denies SOB.

## 2022-04-29 NOTE — ED PROVIDER NOTES
EMERGENCY DEPARTMENT HISTORY AND PHYSICAL EXAM      Date: 2022  Patient Name: Keyana Cabrera    History of Presenting Illness     No chief complaint on file. History Provided By: Patient    HPI: Keyana Cabrera, 46 y.o. male with a past medical history significant diabetes presents to the ED with cc of chest pain for 2 weeks since his diabetic medication was change. Patient denies sob, radiation of pain, Nausea, or vomiting, or diaphoresis, fever or chills. Patient was in ED 2 weeks a go with same symptoms. There are no other complaints, changes, or physical findings at this time. PCP: Sherrill Hinkle NP    No current facility-administered medications on file prior to encounter. Current Outpatient Medications on File Prior to Encounter   Medication Sig Dispense Refill    glucose blood VI test strips (OneTouch Ultra Test) strip Check glucose twice a day 100 Strip 5    lancets (OneTouch UltraSoft Lancets) misc Check glucose twice a day 100 Each 5    Blood-Glucose Meter (OneTouch Ultra2 Meter) monitoring kit Check glucose twice a day 1 Kit 0    insulin glargine (LANTUS,BASAGLAR) 100 unit/mL (3 mL) inpn Inject 40 units every morning 12 mL 3    [] pioglitazone (ACTOS) 30 mg tablet Take 1 Tablet by mouth daily for 30 days. 30 Tablet 3    metFORMIN (GLUCOPHAGE) 1,000 mg tablet Take 1 tablet by mouth twice daily  Indications: type 2 diabetes mellitus 180 Tablet 1    glucose blood VI test strips (Accu-Chek Chika Plus test strp) strip Check Sugar twice a day 100 Strip 5    lancets (Accu-Chek Multiclix Lancet) misc Use twice a day to check sugars 100 Each 5    Insulin Needles, Disposable, 31 gauge x 5/16\" ndle Once a day 100 Each 5    Blood-Glucose Meter (Accu-Chek Chika Plus Meter) misc Check sugar twice a day 1 Each 0    ibuprofen (MOTRIN) 600 mg tablet Take 1 Tablet by mouth every six (6) hours as needed for Pain.  20 Tablet 0    rosuvastatin (CRESTOR) 10 mg tablet Take 1 Tablet by mouth nightly. Indications: high cholesterol and high triglycerides 90 Tablet 1       Past History     Past Medical History:  Past Medical History:   Diagnosis Date    Diabetes mellitus ([facility] [address] 12/16/2019       Past Surgical History:  Past Surgical History:   Procedure Laterality Date    HX APPENDECTOMY         Family History:  Family History   Problem Relation Age of Onset    Diabetes Maternal Grandmother     Diabetes Paternal Grandmother     Hypertension Paternal Grandmother        Social History:  Social History     Tobacco Use    Smoking status: Never Smoker    Smokeless tobacco: Never Used   Vaping Use    Vaping Use: Never used   Substance Use Topics    Alcohol use: Yes     Comment: Very rarely.  Drug use: Yes     Types: Marijuana       Allergies:  No Known Allergies        Review of Systems   Constitutional: Negative. HENT: Negative. Eyes: Negative. Respiratory: Negative. Negative for cough and shortness of breath. Cardiovascular: Positive for chest pain. Gastrointestinal: Negative. Endocrine: Negative. Genitourinary: Negative. Musculoskeletal: Negative. Skin: Negative. Allergic/Immunologic: Negative. Neurological: Negative. Hematological: Negative. Psychiatric/Behavioral: Negative. All other systems reviewed and are negative. Physical Exam     Physical Exam  Vitals and nursing note reviewed. Constitutional:       Appearance: Normal appearance. HENT:      Head: Normocephalic. Nose: Nose normal.      Mouth/Throat:      Mouth: Mucous membranes are moist.   Eyes:      Pupils: Pupils are equal, round, and reactive to light. Cardiovascular:      Rate and Rhythm: Normal rate. Heart sounds: No murmur heard. No friction rub. No gallop. No S3 or S4 sounds. Pulmonary:      Effort: Pulmonary effort is normal.   Abdominal:      General: Abdomen is flat. Musculoskeletal:         General: Tenderness present. Normal range of motion.       Cervical back: Normal range of motion. Comments: Tenderness on the right side   Skin:     General: Skin is warm. Neurological:      General: No focal deficit present. Mental Status: He is alert and oriented to person, place, and time. Psychiatric:         Mood and Affect: Mood normal.         Lab and Diagnostic Study Results     Labs -     Recent Results (from the past 12 hour(s))   EKG, 12 LEAD, INITIAL    Collection Time: 04/29/22 10:00 AM   Result Value Ref Range    Ventricular Rate 77 BPM    Atrial Rate 76 BPM    P-R Interval 143 ms    QRS Duration 89 ms    Q-T Interval 394 ms    QTC Calculation (Bezet) 446 ms    Calculated P Axis 0 degrees    Calculated R Axis -13 degrees    Calculated T Axis 26 degrees    Diagnosis Atrial fibrillation        Radiologic Studies -   @lastxrresult@  CT Results  (Last 48 hours)    None        CXR Results  (Last 48 hours)    None            Medical Decision Making   - I am the first provider for this patient. - I reviewed the vital signs, available nursing notes, past medical history, past surgical history, family history and social history. - Initial assessment performed. The patients presenting problems have been discussed, and they are in agreement with the care plan formulated and outlined with them. I have encouraged them to ask questions as they arise throughout their visit. Vital Signs-Reviewed the patient's vital signs. No data found. Records Reviewed: Nursing Notes    The patient presents with chest pain with a differential diagnosis of  abnormal EKG, ACS, arrhythmia, acute MI, pulmonary edema/CHF, angina, aortic dissection, bronchitis, chest wall pain, costochondritis, dyspnea, GERD, pericarditis, pnuemothorax and pnuemonia      ED Course:          Provider Notes (Medical Decision Making):      MDM       Procedures   Medical Decision Makingedical Decision Making  Performed by: Jessica Andrea MD  PROCEDURES:Procedures       Disposition   Disposition: Condition stable  DC- Adult Discharges: All of the diagnostic tests were reviewed and questions answered. Diagnosis, care plan and treatment options were discussed. The patient understands the instructions and will follow up as directed. The patients results have been reviewed with them. They have been counseled regarding their diagnosis. The patient verbally convey understanding and agreement of the signs, symptoms, diagnosis, treatment and prognosis and additionally agrees to follow up as recommended with their PCP in 24 - 48 hours. They also agree with the care-plan and convey that all of their questions have been answered. I have also put together some discharge instructions for them that include: 1) educational information regarding their diagnosis, 2) how to care for their diagnosis at home, as well a 3) list of reasons why they would want to return to the ED prior to their follow-up appointment, should their condition change. DISCHARGE PLAN:  1. Current Discharge Medication List      CONTINUE these medications which have NOT CHANGED    Details   !! glucose blood VI test strips (OneTouch Ultra Test) strip Check glucose twice a day  Qty: 100 Strip, Refills: 5    Associated Diagnoses: Type 2 diabetes mellitus without complication, with long-term current use of insulin ([address]      ! ! lancets (OneTouch UltraSoft Lancets) misc Check glucose twice a day  Qty: 100 Each, Refills: 5    Associated Diagnoses: Type 2 diabetes mellitus without complication, with long-term current use of insulin ([facility])      Blood-Glucose Meter (OneTouch Ultra2 Meter) monitoring kit Check glucose twice a day  Qty: 1 Kit, Refills: 0    Associated Diagnoses: Type 2 diabetes mellitus without complication, with long-term current use of insulin ([facility])      insulin glargine (LANTUS,BASAGLAR) 100 unit/mL (3 mL) inpn Inject 40 units every morning  Qty: 12 mL, Refills: 3    Comments: DC Novolin 70/30  Associated Diagnoses: Type 2 diabetes mellitus without complication, with long-term current use of insulin ([facility])      metFORMIN (GLUCOPHAGE) 1,000 mg tablet Take 1 tablet by mouth twice daily  Indications: type 2 diabetes mellitus  Qty: 180 Tablet, Refills: 1    Associated Diagnoses: Type 2 diabetes mellitus without complication, with long-term current use of insulin ([facility] [address]      ! ! glucose blood VI test strips (Accu-Chek Chika Plus test strp) strip Check Sugar twice a day  Qty: 100 Strip, Refills: 5    Associated Diagnoses: Type 2 diabetes mellitus without complication, with long-term current use of insulin ([facility] [address]      ! ! lancets (Accu-Chek Multiclix Lancet) misc Use twice a day to check sugars  Qty: 100 Each, Refills: 5    Comments: Needs lancing device DX E11.9 NPI [id number]  Associated Diagnoses: Type 2 diabetes mellitus without complication, with long-term current use of insulin ([facility])      Insulin Needles, Disposable, 31 gauge x 5/16\" ndle Once a day  Qty: 100 Each, Refills: 5    Associated Diagnoses: Type 2 diabetes mellitus without complication, with long-term current use of insulin ([facility])      Blood-Glucose Meter (Accu-Chek Chika Plus Meter) misc Check sugar twice a day  Qty: 1 Each, Refills: 0    Associated Diagnoses: Type 2 diabetes mellitus without complication, with long-term current use of insulin ([facility])      ibuprofen (MOTRIN) 600 mg tablet Take 1 Tablet by mouth every six (6) hours as needed for Pain. Qty: 20 Tablet, Refills: 0      rosuvastatin (CRESTOR) 10 mg tablet Take 1 Tablet by mouth nightly. Indications: high cholesterol and high triglycerides  Qty: 90 Tablet, Refills: 1    Associated Diagnoses: Hyperlipidemia associated with type 2 diabetes mellitus ([facility] [address]       ! ! - Potential duplicate medications found. Please discuss with provider. STOP taking these medications       pioglitazone (ACTOS) 30 mg tablet Comments:   Reason for Stoppin.   Follow-up Information    None       3. Return to ED if worse   4.    Current Discharge Medication List            Diagnosis     Clinical Impression:     ICD-10-CM ICD-9-CM    1. Atypical chest pain  R07.89 786.59      Attestations:    Lucero Mijares MD    Please note that this dictation was completed with [employer], the computer voice recognition software. Quite often unanticipated grammatical, syntax, homophones, and other interpretive errors are inadvertently transcribed by the computer software. Please disregard these errors. Please excuse any errors that have escaped final proofreading. Thank you.

## 2022-04-29 NOTE — DISCHARGE INSTRUCTIONS
Call [facility] on Monday for an appointment at [phone].

## 2022-05-13 ENCOUNTER — TRANSCRIBE ORDER (OUTPATIENT)
Dept: SCHEDULING | Age: 42
End: 2022-05-13

## 2022-05-13 DIAGNOSIS — R07.9 CHEST PAIN, UNSPECIFIED: Primary | ICD-10-CM

## 2022-05-18 ENCOUNTER — HOSPITAL ENCOUNTER (EMERGENCY)
Age: 42
Discharge: HOME | End: 2022-05-18
Attending: EMERGENCY MEDICINE
Payer: COMMERCIAL

## 2022-05-18 VITALS
WEIGHT: 173 LBS | RESPIRATION RATE: 18 BRPM | TEMPERATURE: 98 F | SYSTOLIC BLOOD PRESSURE: 133 MMHG | BODY MASS INDEX: 27.8 KG/M2 | OXYGEN SATURATION: 98 % | HEART RATE: 94 BPM | HEIGHT: 66 IN | DIASTOLIC BLOOD PRESSURE: 89 MMHG

## 2022-05-18 DIAGNOSIS — G62.9 NEUROPATHY: Primary | ICD-10-CM

## 2022-05-18 PROCEDURE — 99284 EMERGENCY DEPT VISIT MOD MDM: CPT

## 2022-05-18 PROCEDURE — 74011250636 HC RX REV CODE- 250/636: Performed by: EMERGENCY MEDICINE

## 2022-05-18 PROCEDURE — 96372 THER/PROPH/DIAG INJ SC/IM: CPT

## 2022-05-18 RX ORDER — KETOROLAC TROMETHAMINE 30 MG/ML
60 INJECTION, SOLUTION INTRAMUSCULAR; INTRAVENOUS
Status: COMPLETED | OUTPATIENT
Start: 2022-05-18 | End: 2022-05-18

## 2022-05-18 RX ORDER — KETOROLAC TROMETHAMINE 10 MG/1
10 TABLET, FILM COATED ORAL
Qty: 16 TABLET | Refills: 0 | Status: SHIPPED | OUTPATIENT
Start: 2022-05-18 | End: 2022-05-18 | Stop reason: SDUPTHER

## 2022-05-18 RX ORDER — KETOROLAC TROMETHAMINE 10 MG/1
10 TABLET, FILM COATED ORAL
Qty: 16 TABLET | Refills: 0 | Status: SHIPPED | OUTPATIENT
Start: 2022-05-18 | End: 2022-05-22

## 2022-05-18 RX ADMIN — KETOROLAC TROMETHAMINE 60 MG: 30 INJECTION, SOLUTION INTRAMUSCULAR at 09:37

## 2022-05-18 NOTE — ED TRIAGE NOTES
Pt reports pain in lower extremities x 3 wks--woke him from sleep last night-- reports \"it feels like its on fire and pins and needles sometimes. \"--hx of DM Type 2.

## 2022-05-18 NOTE — ED PROVIDER NOTES
EMERGENCY DEPARTMENT HISTORY AND PHYSICAL EXAM      Date: 5/18/2022  Patient Name: Jose Lebron. History of Presenting Illness     Chief Complaint   Patient presents with    Leg Pain       History Provided By: Patient    HPI: Jose Lebron., 46 y.o. male with a past medical history significant diabetes presents to the ED with cc of bilateral lower extremity pain tingling in his feet increased pain in when ambulating and fully weightbearing, patient denies any trauma, patient states his blood sugars over the last 3 weeks have been running in the mid 100s's meaning between 150, prior to patient starting Lantus which she has been on for the last 3 weeks his blood sugars were in the 400s to 500s range when patient was taking 70/30 insulin, patient diagnosed with diabetes last year    There are no other complaints, changes, or physical findings at this time. PCP: Michael Blackwood NP    No current facility-administered medications on file prior to encounter.      Current Outpatient Medications on File Prior to Encounter   Medication Sig Dispense Refill    glucose blood VI test strips (OneTouch Ultra Test) strip Check glucose twice a day 100 Strip 5    lancets (OneTouch UltraSoft Lancets) misc Check glucose twice a day 100 Each 5    Blood-Glucose Meter (OneTouch Ultra2 Meter) monitoring kit Check glucose twice a day 1 Kit 0    insulin glargine (LANTUS,BASAGLAR) 100 unit/mL (3 mL) inpn Inject 40 units every morning 12 mL 3    metFORMIN (GLUCOPHAGE) 1,000 mg tablet Take 1 tablet by mouth twice daily  Indications: type 2 diabetes mellitus 180 Tablet 1    glucose blood VI test strips (Accu-Chek Chika Plus test strp) strip Check Sugar twice a day 100 Strip 5    lancets (Accu-Chek Multiclix Lancet) misc Use twice a day to check sugars 100 Each 5    Insulin Needles, Disposable, 31 gauge x 5/16\" ndle Once a day 100 Each 5    Blood-Glucose Meter (Accu-Chek Chika Plus Meter) misc Check sugar twice a day 1 Each 0  ibuprofen (MOTRIN) 600 mg tablet Take 1 Tablet by mouth every six (6) hours as needed for Pain. 20 Tablet 0    rosuvastatin (CRESTOR) 10 mg tablet Take 1 Tablet by mouth nightly. Indications: high cholesterol and high triglycerides 90 Tablet 1       Past History     Past Medical History:  Past Medical History:   Diagnosis Date    Diabetes mellitus ([facility] [address] 12/16/2019       Past Surgical History:  Past Surgical History:   Procedure Laterality Date    HX APPENDECTOMY         Family History:  Family History   Problem Relation Age of Onset    Diabetes Maternal Grandmother     Diabetes Paternal Grandmother     Hypertension Paternal Grandmother        Social History:  Social History     Tobacco Use    Smoking status: Never Smoker    Smokeless tobacco: Never Used   Vaping Use    Vaping Use: Never used   Substance Use Topics    Alcohol use: Yes     Comment: Very rarely.  Drug use: Yes     Types: Marijuana       Allergies:  No Known Allergies      Review of Systems     Review of Systems   Constitutional: Negative for chills and fever. HENT: Negative for rhinorrhea and sore throat. Eyes: Negative for pain and visual disturbance. Respiratory: Negative for cough and shortness of breath. Cardiovascular: Negative for chest pain and leg swelling. Gastrointestinal: Negative for abdominal pain and vomiting. Endocrine: Negative for polydipsia and polyuria. Genitourinary: Negative for dysuria and hematuria. Musculoskeletal: Positive for myalgias. Negative for back pain and neck pain. Skin: Negative for color change and pallor. Neurological: Negative for weakness and headaches. Psychiatric/Behavioral: Negative for agitation and suicidal ideas. Physical Exam     Physical Exam  Vitals and nursing note reviewed. Constitutional:       General: He is not in acute distress. Appearance: He is not ill-appearing, toxic-appearing or diaphoretic. HENT:      Head: Normocephalic and atraumatic. Right Ear: Tympanic membrane normal.      Left Ear: Tympanic membrane normal.      Nose: Nose normal. No congestion. Mouth/Throat:      Mouth: Mucous membranes are moist.      Pharynx: Oropharynx is clear. Eyes:      Extraocular Movements: Extraocular movements intact. Conjunctiva/sclera: Conjunctivae normal.      Pupils: Pupils are equal, round, and reactive to light. Cardiovascular:      Rate and Rhythm: Normal rate and regular rhythm. Pulses: Normal pulses. Heart sounds: Normal heart sounds. Pulmonary:      Effort: Pulmonary effort is normal.      Breath sounds: Normal breath sounds. Abdominal:      General: Bowel sounds are normal.      Palpations: Abdomen is soft. Tenderness: There is no abdominal tenderness. Musculoskeletal:         General: No tenderness, deformity or signs of injury. Normal range of motion. Cervical back: Normal range of motion and neck supple. No rigidity or tenderness. Lymphadenopathy:      Cervical: No cervical adenopathy. Skin:     General: Skin is warm and dry. Capillary Refill: Capillary refill takes less than 2 seconds. Findings: No rash. Neurological:      General: No focal deficit present. Mental Status: He is alert and oriented to person, place, and time. Cranial Nerves: No cranial nerve deficit. Sensory: No sensory deficit. Psychiatric:         Mood and Affect: Mood normal.         Behavior: Behavior normal.         Lab and Diagnostic Study Results     Labs -   No results found for this or any previous visit (from the past 12 hour(s)). Radiologic Studies -   @lastxrresult@  CT Results  (Last 48 hours)    None        CXR Results  (Last 48 hours)    None            Medical Decision Making   - I am the first provider for this patient. - I reviewed the vital signs, available nursing notes, past medical history, past surgical history, family history and social history. - Initial assessment performed. The patients presenting problems have been discussed, and they are in agreement with the care plan formulated and outlined with them. I have encouraged them to ask questions as they arise throughout their visit. Vital Signs-Reviewed the patient's vital signs. Patient Vitals for the past 12 hrs:   Temp Pulse Resp BP SpO2   05/18/22 0904 98 °F (36.7 °C) 94 18 133/89 98 %       Records Reviewed: Nursing Notes    The patient presents with leg pain with a differential diagnosis of DVT, muscle strain, soft tissue injury      ED Course:          Provider Notes (Medical Decision Making): MDM       Procedures   Medical Decision Makingedical Decision Making  Performed by: Kamila Black MD  PROCEDURES:  Procedures       Disposition   Disposition: Condition stable and improved  DC- Adult Discharges: All of the diagnostic tests were reviewed and questions answered. Diagnosis, care plan and treatment options were discussed. The patient understands the instructions and will follow up as directed. The patients results have been reviewed with them. They have been counseled regarding their diagnosis. The patient verbally convey understanding and agreement of the signs, symptoms, diagnosis, treatment and prognosis and additionally agrees to follow up as recommended with their PCP in 24 - 48 hours. They also agree with the care-plan and convey that all of their questions have been answered. I have also put together some discharge instructions for them that include: 1) educational information regarding their diagnosis, 2) how to care for their diagnosis at home, as well a 3) list of reasons why they would want to return to the ED prior to their follow-up appointment, should their condition change. DISCHARGE PLAN:  1.    Current Discharge Medication List      CONTINUE these medications which have NOT CHANGED    Details   !! glucose blood VI test strips (OneTouch Ultra Test) strip Check glucose twice a day  Qty: 100 Strip, Refills: 5    Associated Diagnoses: Type 2 diabetes mellitus without complication, with long-term current use of insulin ([address]      ! ! lancets (OneTouch UltraSoft Lancets) misc Check glucose twice a day  Qty: 100 Each, Refills: 5    Associated Diagnoses: Type 2 diabetes mellitus without complication, with long-term current use of insulin ([facility])      Blood-Glucose Meter (OneTouch Ultra2 Meter) monitoring kit Check glucose twice a day  Qty: 1 Kit, Refills: 0    Associated Diagnoses: Type 2 diabetes mellitus without complication, with long-term current use of insulin ([facility])      insulin glargine (LANTUS,BASAGLAR) 100 unit/mL (3 mL) inpn Inject 40 units every morning  Qty: 12 mL, Refills: 3    Comments: SHANIQUA Novolin 70/30  Associated Diagnoses: Type 2 diabetes mellitus without complication, with long-term current use of insulin ([facility])      metFORMIN (GLUCOPHAGE) 1,000 mg tablet Take 1 tablet by mouth twice daily  Indications: type 2 diabetes mellitus  Qty: 180 Tablet, Refills: 1    Associated Diagnoses: Type 2 diabetes mellitus without complication, with long-term current use of insulin ([address]      ! ! glucose blood VI test strips (Accu-Chek Chika Plus test strp) strip Check Sugar twice a day  Qty: 100 Strip, Refills: 5    Associated Diagnoses: Type 2 diabetes mellitus without complication, with long-term current use of insulin ([address]      ! ! lancets (Accu-Chek Multiclix Lancet) misc Use twice a day to check sugars  Qty: 100 Each, Refills: 5    Comments: Needs lancing device DX E11.9 NPI [id number]  Associated Diagnoses: Type 2 diabetes mellitus without complication, with long-term current use of insulin ([facility])      Insulin Needles, Disposable, 31 gauge x 5/16\" ndle Once a day  Qty: 100 Each, Refills: 5    Associated Diagnoses: Type 2 diabetes mellitus without complication, with long-term current use of insulin ([facility])      Blood-Glucose Meter (Accu-Chek Chika Plus Meter) misc Check sugar twice a day  Qty: 1 Each, Refills: 0    Associated Diagnoses: Type 2 diabetes mellitus without complication, with long-term current use of insulin ([facility])      ibuprofen (MOTRIN) 600 mg tablet Take 1 Tablet by mouth every six (6) hours as needed for Pain. Qty: 20 Tablet, Refills: 0      rosuvastatin (CRESTOR) 10 mg tablet Take 1 Tablet by mouth nightly. Indications: high cholesterol and high triglycerides  Qty: 90 Tablet, Refills: 1    Associated Diagnoses: Hyperlipidemia associated with type 2 diabetes mellitus ([facility]       ! ! - Potential duplicate medications found. Please discuss with provider. 2.   Follow-up Information    None       3. Return to ED if worse   4. Current Discharge Medication List            Diagnosis     Clinical Impression: No diagnosis found. Attestations:    Carmel Stark MD    Please note that this dictation was completed with Qt Software, the computer voice recognition software. Quite often unanticipated grammatical, syntax, homophones, and other interpretive errors are inadvertently transcribed by the computer software. Please disregard these errors. Please excuse any errors that have escaped final proofreading. Thank you.

## 2022-05-19 ENCOUNTER — TELEPHONE (OUTPATIENT)
Dept: PRIMARY CARE CLINIC | Age: 42
End: 2022-05-19

## 2022-05-19 ENCOUNTER — HOSPITAL ENCOUNTER (OUTPATIENT)
Dept: NON INVASIVE DIAGNOSTICS | Age: 42
End: 2022-05-19
Attending: NURSE PRACTITIONER
Payer: COMMERCIAL

## 2022-05-19 ENCOUNTER — HOSPITAL ENCOUNTER (OUTPATIENT)
Dept: NUCLEAR MEDICINE | Age: 42
End: 2022-05-19
Attending: NURSE PRACTITIONER
Payer: COMMERCIAL

## 2022-05-19 ENCOUNTER — HOSPITAL ENCOUNTER (OUTPATIENT)
Dept: VASCULAR SURGERY | Age: 42
Discharge: HOME | End: 2022-05-19
Attending: NURSE PRACTITIONER
Payer: COMMERCIAL

## 2022-05-19 VITALS — BODY MASS INDEX: 27.15 KG/M2 | HEIGHT: 67 IN | WEIGHT: 173 LBS

## 2022-05-19 DIAGNOSIS — R07.9 CHEST PAIN, UNSPECIFIED: ICD-10-CM

## 2022-05-19 LAB
ECHO AO ROOT DIAM: 3.5 CM
ECHO AO ROOT INDEX: 1.84 CM/M2
ECHO AV AREA PEAK VELOCITY: 3 CM2
ECHO AV AREA VTI: 2.8 CM2
ECHO AV AREA/BSA PEAK VELOCITY: 1.6 CM2/M2
ECHO AV AREA/BSA VTI: 1.5 CM2/M2
ECHO AV MEAN GRADIENT: 4 MMHG
ECHO AV MEAN VELOCITY: 0.9 M/S
ECHO AV PEAK GRADIENT: 7 MMHG
ECHO AV PEAK VELOCITY: 1.3 M/S
ECHO AV VELOCITY RATIO: 0.85
ECHO AV VTI: 25.3 CM
ECHO LA DIAMETER INDEX: 1.58 CM/M2
ECHO LA DIAMETER: 3 CM
ECHO LA TO AORTIC ROOT RATIO: 0.86
ECHO LA VOL 4C: 22 ML (ref 18–58)
ECHO LA VOLUME INDEX A4C: 12 ML/M2 (ref 16–34)
ECHO LV E' LATERAL VELOCITY: 11 CM/S
ECHO LV E' SEPTAL VELOCITY: 9 CM/S
ECHO LV EJECTION FRACTION BIPLANE: 55 % (ref 55–100)
ECHO LV FRACTIONAL SHORTENING: 32 % (ref 28–44)
ECHO LV GLOBAL LONGITUDINAL STRAIN (GLS): -16.4 %
ECHO LV INTERNAL DIMENSION DIASTOLE INDEX: 2.95 CM/M2
ECHO LV INTERNAL DIMENSION DIASTOLIC: 5.6 CM (ref 4.2–5.9)
ECHO LV INTERNAL DIMENSION SYSTOLIC INDEX: 2 CM/M2
ECHO LV INTERNAL DIMENSION SYSTOLIC: 3.8 CM
ECHO LV IVSD: 0.7 CM (ref 0.6–1)
ECHO LV MASS 2D: 165 G (ref 88–224)
ECHO LV MASS INDEX 2D: 86.9 G/M2 (ref 49–115)
ECHO LV POSTERIOR WALL DIASTOLIC: 0.9 CM (ref 0.6–1)
ECHO LV RELATIVE WALL THICKNESS RATIO: 0.32
ECHO LVOT AREA: 3.5 CM2
ECHO LVOT AV VTI INDEX: 0.77
ECHO LVOT CARDIAC OUTPUT: 3.3 LITER/MINUTE
ECHO LVOT CARDIAC OUTPUT: 3.3 LITER/MINUTE
ECHO LVOT CARDIAC OUTPUT: 3.4 LITER/MINUTE
ECHO LVOT DIAM: 2.1 CM
ECHO LVOT MEAN GRADIENT: 2 MMHG
ECHO LVOT PEAK GRADIENT: 5 MMHG
ECHO LVOT PEAK VELOCITY: 1.1 M/S
ECHO LVOT STROKE VOLUME INDEX: 35.5 ML/M2
ECHO LVOT SV: 67.5 ML
ECHO LVOT VTI: 19.5 CM
ECHO MV A VELOCITY: 0.8 M/S
ECHO MV E DECELERATION TIME (DT): 169 MS
ECHO MV E VELOCITY: 0.7 M/S
ECHO MV E/A RATIO: 0.88
ECHO MV E/E' LATERAL: 6.36
ECHO MV E/E' RATIO (AVERAGED): 7.07
ECHO MV E/E' SEPTAL: 7.78
ECHO RA AREA 4C: 9 CM2
ECHO RV FREE WALL PEAK S': 12 CM/S
ECHO RV INTERNAL DIMENSION: 3.6 CM
ECHO RV TAPSE: 2.3 CM (ref 1.7–?)

## 2022-05-19 PROCEDURE — 96374 THER/PROPH/DIAG INJ IV PUSH: CPT

## 2022-05-19 NOTE — TELEPHONE ENCOUNTER
Patient states that he went to ER on 5/18/2022. Bottom of feet very tender on the bottom, feels like on fire. Was only given something for pain which made it worse. Please advise.

## 2022-05-20 ENCOUNTER — OFFICE VISIT (OUTPATIENT)
Dept: PRIMARY CARE CLINIC | Age: 42
End: 2022-05-20
Payer: COMMERCIAL

## 2022-05-20 VITALS
TEMPERATURE: 98.1 F | HEIGHT: 67 IN | SYSTOLIC BLOOD PRESSURE: 139 MMHG | HEART RATE: 93 BPM | BODY MASS INDEX: 26.09 KG/M2 | RESPIRATION RATE: 18 BRPM | DIASTOLIC BLOOD PRESSURE: 87 MMHG | WEIGHT: 166.2 LBS | OXYGEN SATURATION: 98 %

## 2022-05-20 DIAGNOSIS — E11.8 DIABETES MELLITUS TYPE 2 WITH COMPLICATIONS (HCC): Primary | ICD-10-CM

## 2022-05-20 DIAGNOSIS — G62.9 NEUROPATHY: ICD-10-CM

## 2022-05-20 DIAGNOSIS — E78.5 HYPERLIPIDEMIA ASSOCIATED WITH TYPE 2 DIABETES MELLITUS (HCC): ICD-10-CM

## 2022-05-20 DIAGNOSIS — R07.9 CHEST PAIN, UNSPECIFIED TYPE: ICD-10-CM

## 2022-05-20 DIAGNOSIS — E11.69 HYPERLIPIDEMIA ASSOCIATED WITH TYPE 2 DIABETES MELLITUS (HCC): ICD-10-CM

## 2022-05-20 PROCEDURE — 99214 OFFICE O/P EST MOD 30 MIN: CPT | Performed by: NURSE PRACTITIONER

## 2022-05-20 PROCEDURE — 3046F HEMOGLOBIN A1C LEVEL >9.0%: CPT | Performed by: NURSE PRACTITIONER

## 2022-05-20 RX ORDER — GABAPENTIN 300 MG/1
300 CAPSULE ORAL 2 TIMES DAILY
Qty: 60 CAPSULE | Refills: 1 | Status: SHIPPED | OUTPATIENT
Start: 2022-05-20 | End: 2022-06-29

## 2022-05-20 RX ORDER — PIOGLITAZONEHYDROCHLORIDE 30 MG/1
1 TABLET ORAL DAILY
COMMUNITY
End: 2022-06-29 | Stop reason: SDUPTHER

## 2022-05-20 NOTE — PROGRESS NOTES
Chief Complaint   Patient presents with    Foot Swelling    Foot Pain    ED Follow-up     Follow up     1. Have you been to the ER, urgent care clinic since your last visit? Hospitalized since your last visit? Yes [facility]    2. Have you seen or consulted any other health care providers outside of the [address] [place] [address] since your last visit? Include any pap smears or colon screening.  No

## 2022-05-20 NOTE — PROGRESS NOTES
Ahmet Lynn. is a 46 y.o. male who presents to the office today for the following:    Chief Complaint   Patient presents with    Foot Swelling    Foot Pain    ED Follow-up       Past Medical History:   Diagnosis Date    Diabetes mellitus ([address] 12/16/2019       Past Surgical History:   Procedure Laterality Date    HX APPENDECTOMY          Family History   Problem Relation Age of Onset    Diabetes Maternal Grandmother     Diabetes Paternal Grandmother     Hypertension Paternal Grandmother         Social History     Tobacco Use    Smoking status: Never Smoker    Smokeless tobacco: Never Used   Vaping Use    Vaping Use: Never used   Substance Use Topics    Alcohol use: Yes     Comment: Very rarely.  Drug use: Yes     Types: Marijuana        HPI  Patient here today as new patient to provider with reported PMH of uncontrolled type 2 diabetes and hyperlipidemia. Reports taking medications as noted in chart. Recently seen by Dr. Aisha Swan who managed diabetes. Also seeing cardiology due to chest pain and undergoing work up. Went to ED 2 days ago due to increased burning and numbness in both feet. Has had problems with this since at least march but worse over past couple of weeks. Was given some toradol in the ED but does not feel this has helped much. Denies any injury. Does report that his sugars were very high but had all of his medicine switched in march and now sugars running in the 100s-150s range. This is much different from the past 2 years where sugar have been very high. Current Outpatient Medications on File Prior to Visit   Medication Sig    pioglitazone (ACTOS) 30 mg tablet Take 1 Tablet by mouth daily.  ketorolac (TORADOL) 10 mg tablet Take 1 Tablet by mouth every six (6) hours as needed for Pain (Do not take at the same time with ibuprofen) for up to 4 days.  Indications: excessive pain    [DISCONTINUED] glucose blood VI test strips (OneTouch Ultra Test) strip Check glucose twice a day  [DISCONTINUED] lancets (OneTouch UltraSoft Lancets) misc Check glucose twice a day    [DISCONTINUED] Blood-Glucose Meter (OneTouch Ultra2 Meter) monitoring kit Check glucose twice a day    insulin glargine (LANTUS,BASAGLAR) 100 unit/mL (3 mL) inpn Inject 40 units every morning    metFORMIN (GLUCOPHAGE) 1,000 mg tablet Take 1 tablet by mouth twice daily  Indications: type 2 diabetes mellitus    glucose blood VI test strips (Accu-Chek Chika Plus test strp) strip Check Sugar twice a day    lancets (Accu-Chek Multiclix Lancet) misc Use twice a day to check sugars    Insulin Needles, Disposable, 31 gauge x 5/16\" ndle Once a day    ibuprofen (MOTRIN) 600 mg tablet Take 1 Tablet by mouth every six (6) hours as needed for Pain.  [DISCONTINUED] Blood-Glucose Meter (Accu-Chek Chika Plus Meter) misc Check sugar twice a day    rosuvastatin (CRESTOR) 10 mg tablet Take 1 Tablet by mouth nightly. Indications: high cholesterol and high triglycerides     No current facility-administered medications on file prior to visit. Medications Ordered Today   Medications    gabapentin (NEURONTIN) 300 mg capsule     Sig: Take 1 Capsule by mouth two (2) times a day. Max Daily Amount: 600 mg. Dispense:  60 Capsule     Refill:  1        Review of Systems   Constitutional: Negative. HENT: Negative. Eyes: Negative. Respiratory: Negative. Cardiovascular: Negative. Gastrointestinal: Negative. Genitourinary: Negative. Musculoskeletal: Positive for myalgias. Skin: Negative for itching and rash. Neurological: Positive for tingling and sensory change. Negative for dizziness, speech change, focal weakness, seizures, weakness and headaches. Psychiatric/Behavioral: Negative.            Visit Vitals  /87 (BP 1 Location: Left upper arm, BP Patient Position: Sitting, BP Cuff Size: Adult)   Pulse 93   Temp 98.1 °F (36.7 °C) (Temporal)   Resp 18   Ht 5' 7\" (1.702 m)   Wt 166 lb 3.2 oz (75.4 kg)   SpO2 98%   BMI 26.03 kg/m²       Physical Exam  Vitals and nursing note reviewed. Constitutional:       Appearance: Normal appearance. Cardiovascular:      Rate and Rhythm: Normal rate and regular rhythm. Pulses: Normal pulses. Heart sounds: Normal heart sounds. Pulmonary:      Effort: Pulmonary effort is normal.      Breath sounds: Normal breath sounds. Abdominal:      General: Bowel sounds are normal.      Palpations: Abdomen is soft. Tenderness: There is no abdominal tenderness. There is no guarding. Musculoskeletal:         General: Normal range of motion. Feet:      Right foot:      Skin integrity: Dry skin present. No ulcer, blister, skin breakdown, erythema or warmth. Left foot:      Skin integrity: Dry skin present. No ulcer, blister, skin breakdown, erythema or warmth. Comments: Generalized tenderness to toes and plantar surface of distal feet  Skin:     General: Skin is warm and dry. Neurological:      Mental Status: He is alert and oriented to person, place, and time. Mental status is at baseline. 1. Diabetes mellitus type 2 with complications ([facility]  Lab Results   Component Value Date/Time    Hemoglobin A1c >15.5 (H) 10/26/2021 10:39 AM    Hemoglobin A1c (POC) 14.0 03/29/2022 10:34 AM   Dx 2020  On pioglitazone 30mg daily, metformin 1000mg twice daily, lantus 40 units nightly  Reports that average fasting running in the 100s-150s. No hypoglycemia  States that he saw endocrinologist and all medications were changed which has significantly lowered his sugars. He will continue checking twice daily and carry meter to his next appointment  Reports he is up to date on diabetic eye exam  Encouraged to follow diabetic diet and getting regular exercise 3-5 times weekly for 30-45 minutes  Continuing care with Dr. Peterson Castro who is managing. 2. Neuropathy  Reports having numbness and tingling in feet since march but recently worsened over past few weeks.   Likely symptoms more prominent since sugars have come down significantly after extended period of running high. He would like to see neurologist to have further testing and will send referral  Discussed trial with gabapentin and reviewed side effects. Advised to taper up by taking 300mg at night then increasing to twice daily after 1 week  Advised do not drive or operate machinery if experiencing drowsiness or otherwise feel impaired while taking medication. ORT score 2 and low risk   Controlled substance agreement discussed and signed   Understands proper use, storage and disposal of medication   Controlled Substance Monitoring:    RX Monitoring 5/20/2022   Periodic Controlled Substance Monitoring Possible medication side effects, risk of tolerance/dependence & alternative treatments discussed. ;No signs of potential drug abuse or diversion identified.        - REFERRAL TO NEUROLOGY  - gabapentin (NEURONTIN) 300 mg capsule; Take 1 Capsule by mouth two (2) times a day. Max Daily Amount: 600 mg. Dispense: 60 Capsule; Refill: 1    3. Chest pain, unspecified type  No current symptoms  He is currently undergoing [facility]     4. Hyperlipidemia  On rosuvastatin as directed         Patient verbalizes understanding of plan of care as discussed above    Follow-up and Dispositions    · Return in about 4 weeks (around 6/17/2022) for or sooner for worsening symptoms.

## 2022-06-29 ENCOUNTER — OFFICE VISIT (OUTPATIENT)
Dept: ENDOCRINOLOGY | Age: 42
End: 2022-06-29
Payer: COMMERCIAL

## 2022-06-29 VITALS
DIASTOLIC BLOOD PRESSURE: 88 MMHG | OXYGEN SATURATION: 98 % | TEMPERATURE: 98.6 F | HEIGHT: 67 IN | WEIGHT: 155.7 LBS | BODY MASS INDEX: 24.44 KG/M2 | SYSTOLIC BLOOD PRESSURE: 138 MMHG | HEART RATE: 103 BPM

## 2022-06-29 DIAGNOSIS — E78.2 MIXED HYPERLIPIDEMIA: ICD-10-CM

## 2022-06-29 DIAGNOSIS — E78.5 HYPERLIPIDEMIA ASSOCIATED WITH TYPE 2 DIABETES MELLITUS (HCC): ICD-10-CM

## 2022-06-29 DIAGNOSIS — Z79.4 TYPE 2 DIABETES MELLITUS WITHOUT COMPLICATION, WITH LONG-TERM CURRENT USE OF INSULIN (HCC): Primary | ICD-10-CM

## 2022-06-29 DIAGNOSIS — E11.42 DIABETIC PERIPHERAL NEUROPATHY ASSOCIATED WITH TYPE 2 DIABETES MELLITUS (HCC): ICD-10-CM

## 2022-06-29 DIAGNOSIS — E11.9 TYPE 2 DIABETES MELLITUS WITHOUT COMPLICATION, WITH LONG-TERM CURRENT USE OF INSULIN (HCC): Primary | ICD-10-CM

## 2022-06-29 DIAGNOSIS — E11.69 HYPERLIPIDEMIA ASSOCIATED WITH TYPE 2 DIABETES MELLITUS (HCC): ICD-10-CM

## 2022-06-29 LAB
GLUCOSE POC: 113 MG/DL
HBA1C MFR BLD HPLC: 6.8 %

## 2022-06-29 PROCEDURE — 82962 GLUCOSE BLOOD TEST: CPT | Performed by: INTERNAL MEDICINE

## 2022-06-29 PROCEDURE — 3044F HG A1C LEVEL LT 7.0%: CPT | Performed by: INTERNAL MEDICINE

## 2022-06-29 PROCEDURE — 83036 HEMOGLOBIN GLYCOSYLATED A1C: CPT | Performed by: INTERNAL MEDICINE

## 2022-06-29 PROCEDURE — 99214 OFFICE O/P EST MOD 30 MIN: CPT | Performed by: INTERNAL MEDICINE

## 2022-06-29 RX ORDER — METFORMIN HYDROCHLORIDE 1000 MG/1
TABLET ORAL
Qty: 180 TABLET | Refills: 3 | Status: SHIPPED | OUTPATIENT
Start: 2022-06-29

## 2022-06-29 RX ORDER — PREGABALIN 75 MG/1
75 CAPSULE ORAL 2 TIMES DAILY
Qty: 60 CAPSULE | Refills: 3 | Status: SHIPPED | OUTPATIENT
Start: 2022-06-29 | End: 2022-07-29

## 2022-06-29 RX ORDER — INSULIN GLARGINE 100 [IU]/ML
INJECTION, SOLUTION SUBCUTANEOUS
Qty: 12 ML | Refills: 3 | Status: SHIPPED | OUTPATIENT
Start: 2022-06-29 | End: 2022-11-01 | Stop reason: SDUPTHER

## 2022-06-29 RX ORDER — PEN NEEDLE, DIABETIC 30 GX3/16"
NEEDLE, DISPOSABLE MISCELLANEOUS
Qty: 100 EACH | Refills: 5 | Status: SHIPPED | OUTPATIENT
Start: 2022-06-29 | End: 2022-08-16 | Stop reason: CLARIF

## 2022-06-29 RX ORDER — PIOGLITAZONEHYDROCHLORIDE 30 MG/1
30 TABLET ORAL DAILY
Qty: 90 TABLET | Refills: 3 | Status: SHIPPED | OUTPATIENT
Start: 2022-06-29 | End: 2022-09-27

## 2022-06-29 NOTE — PROGRESS NOTES
History and Physical    Patient: Marlon Seo MRN: [id number]  SSN: [id number]    YOB: 1980  Age: 46 y.o. Sex: male      Subjective:      Marlon Seo is a 46 y.o. male with past medical history of hyperlipidemia is here for follow-up of type 2 diabetes mellitus. He was sent to me by primary care provider Celia Tapia NP. Patient was diagnosed with diabetes in November 2019 when he was having polyuria, polydipsia, fatigue and unintentional weight loss. After the last visit patient made several changes in his eating habits. He is doing intermittent fasting, drinking only water, not snacking on junk food. He has lost almost 20 pounds since last visit 3 months back. His neuropathy has worsened and he has been referred to a specialist.  Appointment is in August.  He is having to use a cane now. Checking blood glucose once a day every day in the morning. He has not seen any low blood glucose readings but sometimes he gets a little dizzy and he has to hold onto something. Does not check his blood glucose when this happens. He is taking Lantus 40 units in the morning, metformin 1000 mg twice a day, pioglitazone 30 mg daily. Denies any lower extremity edema. Glucometer reading: Checking blood glucose once a day every day.   Readings are ranging from 108 to 140 mg/dL    Updated diabetes history:  · Diagnosis:   · Current treatment: metformin 1000 mg bid, pioglitazone 30 mg every day, Lantus 40 units in the morning  · Past treatment: none  · Glucose checks: 1-2 a day  · Hyperglycemia: yes  · Hypoglycemia: no  · Meals per day: 3, breakfast: fast food, coffee, lunch: skips if he is at work, or may eat eggs, etc, dinner: protein and potatoes and vegetables, snacks: nuts, chips, juice, soda, sweet tea  · Exercise: squats etc. Few times a week  · DM related hospitalizations: yes    Smoking: no  Family history of coronary artery disease/stroke: no    Complications of DM:  · CAD: no  · CVA: no  · PVD: no  · Amputations: no   · Retinopathy: no; last exam was 6-2021  · Gastropathy: no  · Nephropathy: no  · Neuropathy: yes  Sees podiatrist:    Medications:  · Statin: rosuvastatin 10 mg  · ACE-I: no  · ASA: no    · Diabetes education: no    Past Medical History:   Diagnosis Date    Diabetes mellitus ([facility] 12/16/2019    Diabetic neuropathy ([facility]     feet     Past Surgical History:   Procedure Laterality Date    HX APPENDECTOMY        Family History   Problem Relation Age of Onset    Diabetes Maternal Grandmother     Diabetes Paternal Grandmother     Hypertension Paternal Grandmother      Social History     Tobacco Use    Smoking status: Never Smoker    Smokeless tobacco: Never Used   Substance Use Topics    Alcohol use: Not Currently     Comment: Very rarely. Prior to Admission medications    Medication Sig Start Date End Date Taking? Authorizing Provider   pregabalin (LYRICA) 75 mg capsule Take 1 Capsule by mouth two (2) times a day for 30 days. Max Daily Amount: 150 mg. 6/29/22 7/29/22 Yes Tawanna Swan MD   metFORMIN (GLUCOPHAGE) 1,000 mg tablet Take 1 tablet by mouth twice daily  Indications: type 2 diabetes mellitus 6/29/22  Yes Tawanna Swan MD   insulin glargine (LANTUS,BASAGLAR) 100 unit/mL (3 mL) inpn Inject 30 units every morning 6/29/22  Yes Tawanna Swan MD   pioglitazone (ACTOS) 30 mg tablet Take 1 Tablet by mouth daily for 90 days. 6/29/22 9/27/22 Yes Tawanna Swan MD   Insulin Needles, Disposable, 31 gauge x 5/16\" ndle Once a day 6/29/22  Yes Tawanna Swan MD   glucose blood VI test strips (Accu-Chek Chika Plus test strp) strip Check Sugar twice a day 3/29/22  Yes Tawanna Swan MD   lancets (Accu-Chek Multiclix Lancet) misc Use twice a day to check sugars 3/29/22  Yes Tawanna Swan MD   rosuvastatin (CRESTOR) 10 mg tablet Take 1 Tablet by mouth nightly.  Indications: high cholesterol and high triglycerides 10/25/21  Yes Kendall Le MD        No Known Allergies    Review of Systems:  ROS    A comprehensive review of systems was preformed and it is negative except mentioned in HPI    Objective:     Vitals:    06/29/22 1035   BP: 138/88   Pulse: (!) 103   Temp: 98.6 °F (37 °C)   TempSrc: Temporal   SpO2: 98%   Weight: 155 lb 11.2 oz (70.6 kg)   Height: 5' 7\" (1.702 m)        Physical Exam:    Physical Exam  Vitals and nursing note reviewed. Constitutional:       Appearance: Normal appearance. HENT:      Head: Normocephalic and atraumatic. Cardiovascular:      Rate and Rhythm: Normal rate and regular rhythm. Pulmonary:      Effort: Pulmonary effort is normal.      Breath sounds: Normal breath sounds. Neurological:      Mental Status: He is alert. 3-:  diabetic foot exam:  Bilateral diabetic foot exam was performed today. Dorsalis pedis pulses 2+ bilaterally. Monofilament sensation normal bilaterally. No ulcers or skin breakdown, bilateral dry peeling skin bottom of both feet     Labs and Imaging:    Last 3 Recorded Weights in this Encounter    06/29/22 1035   Weight: 155 lb 11.2 oz (70.6 kg)        Lab Results   Component Value Date/Time    Hemoglobin A1c >15.5 (H) 10/26/2021 10:39 AM    Hemoglobin A1c >15.5 (H) 12/03/2020 02:30 PM        Assessment:     Patient Active Problem List   Diagnosis Code    Diabetes mellitus ([facility] E11.9    Hyperlipidemia associated with type 2 diabetes mellitus ([facility] E11.69, E78.5    Mixed hyperlipidemia E78.2           Plan:     Type 2 diabetes mellitus, uncontrolled:  Hemoglobin A1c was >15.5% on 12-3-2020, >15.5% on 10-, >14% on 3-, 6.8% today. Fingerstick blood glucose is 113 mg/dL in my office today. Up to date with diabetes related annual labs: October 2021 except TSH  Up to date with diabetic eye exam: June 2021  Plan:  Remarkable improvement in glucose control with change in lifestyle, diet and medications.   Because of his symptoms that could be from low blood glucose I am going to decrease Lantus from 40 units to 30 units in the morning. Continue metformin 1000 mg twice a day, pioglitazone 30 mg daily. Check blood glucose twice a day every day and follow-up with PCP in 3 months. I will avoid SGLT2 inhibitors because of ketosis prone diabetes. Essential hypertension:  Not on blood pressure medications anymore, blood pressure continues to be normal.  No microalbuminuria, last checked in October 2021. Mixed hyperlipidemia:  10-: Total cholesterol 137, triglycerides 135, LDL 63. Continue rosuvastatin 10 mg at bedtime. Diabetic peripheral neuropathy:  Worsened in past couple months, may be because of rapid decrease in blood glucose. Start Lyrica 75 mg twice a day. Discussed common side effects. Patient has appointment with a neurologist soon. Orders Placed This Encounter    AMB POC GLUCOSE BLOOD, BY GLUCOSE MONITORING DEVICE    AMB POC HEMOGLOBIN A1C    pregabalin (LYRICA) 75 mg capsule     Sig: Take 1 Capsule by mouth two (2) times a day for 30 days. Max Daily Amount: 150 mg. Dispense:  60 Capsule     Refill:  3    metFORMIN (GLUCOPHAGE) 1,000 mg tablet     Sig: Take 1 tablet by mouth twice daily  Indications: type 2 diabetes mellitus     Dispense:  180 Tablet     Refill:  3    insulin glargine (LANTUS,BASAGLAR) 100 unit/mL (3 mL) inpn     Sig: Inject 30 units every morning     Dispense:  12 mL     Refill:  3     DC Novolin 70/30    pioglitazone (ACTOS) 30 mg tablet     Sig: Take 1 Tablet by mouth daily for 90 days.      Dispense:  90 Tablet     Refill:  3    Insulin Needles, Disposable, 31 gauge x 5/16\" ndle     Sig: Once a day     Dispense:  100 Each     Refill:  5        Signed By: Tomas Sapp MD     June 29, 2022      Return to clinic

## 2022-08-16 ENCOUNTER — APPOINTMENT (OUTPATIENT)
Dept: PHYSICAL THERAPY | Age: 42
End: 2022-08-16
Payer: COMMERCIAL

## 2022-08-16 ENCOUNTER — TRANSCRIBE ORDER (OUTPATIENT)
Dept: REGISTRATION | Age: 42
End: 2022-08-16

## 2022-08-16 ENCOUNTER — HOSPITAL ENCOUNTER (OUTPATIENT)
Dept: LAB | Age: 42
Discharge: HOME | End: 2022-08-16
Payer: COMMERCIAL

## 2022-08-16 ENCOUNTER — HOSPITAL ENCOUNTER (EMERGENCY)
Age: 42
Discharge: STILL ADMITTED OR ADMINISTRATIVE | End: 2022-08-16

## 2022-08-16 ENCOUNTER — HOSPITAL ENCOUNTER (OUTPATIENT)
Dept: NON INVASIVE DIAGNOSTICS | Age: 42
Discharge: HOME | End: 2022-08-16
Payer: COMMERCIAL

## 2022-08-16 DIAGNOSIS — R07.9 CHEST PAIN: ICD-10-CM

## 2022-08-16 DIAGNOSIS — R07.9 CHEST PAIN: Primary | ICD-10-CM

## 2022-08-16 LAB
ALBUMIN SERPL-MCNC: 3.8 G/DL (ref 3.5–5)
ALBUMIN/GLOB SERPL: 1.1 {RATIO} (ref 1.1–2.2)
ALP SERPL-CCNC: 80 U/L (ref 45–117)
ALT SERPL-CCNC: 21 U/L (ref 12–78)
ANION GAP SERPL CALC-SCNC: 7 MMOL/L (ref 5–15)
BASOPHILS # BLD: 0 K/UL (ref 0–0.2)
BASOPHILS NFR BLD: 1 % (ref 0–2.5)
BILIRUB SERPL-MCNC: 1.1 MG/DL (ref 0.2–1)
BUN SERPL-MCNC: 13 MG/DL (ref 6–20)
BUN/CREAT SERPL: 20 (ref 12–20)
CA-I BLD-MCNC: 9 MG/DL (ref 8.5–10.1)
CHLORIDE SERPL-SCNC: 104 MMOL/L (ref 97–108)
CO2 SERPL-SCNC: 28 MMOL/L (ref 21–32)
CREAT SERPL-MCNC: 0.64 MG/DL (ref 0.7–1.3)
EOSINOPHIL # BLD: 0.3 K/UL (ref 0–0.7)
EOSINOPHIL NFR BLD: 4 % (ref 0.9–2.9)
ERYTHROCYTE [DISTWIDTH] IN BLOOD BY AUTOMATED COUNT: 14.2 % (ref 11.5–14.5)
GLOBULIN SER CALC-MCNC: 3.6 G/DL (ref 2–4)
GLUCOSE SERPL-MCNC: 130 MG/DL (ref 65–100)
HCT VFR BLD AUTO: 36 % (ref 41–53)
HGB BLD-MCNC: 12.1 G/DL (ref 13.5–17.5)
INR PPP: 1 (ref 0.9–1.1)
LYMPHOCYTES # BLD: 2 K/UL (ref 1–4.8)
LYMPHOCYTES NFR BLD: 33 % (ref 20.5–51.1)
MCH RBC QN AUTO: 30 PG (ref 31–34)
MCHC RBC AUTO-ENTMCNC: 33.7 G/DL (ref 31–36)
MCV RBC AUTO: 89.2 FL (ref 80–100)
MONOCYTES # BLD: 0.2 K/UL (ref 0.2–2.4)
MONOCYTES NFR BLD: 4 % (ref 1.7–9.3)
NEUTS SEG # BLD: 3.4 K/UL (ref 1.8–7.7)
NEUTS SEG NFR BLD: 58 % (ref 42–75)
NRBC # BLD: 0 K/UL
NRBC BLD-RTO: 0.1 PER 100 WBC
PLATELET # BLD AUTO: 231 K/UL (ref 150–400)
PMV BLD AUTO: 7.5 FL (ref 6.5–11.5)
POTASSIUM SERPL-SCNC: 4.1 MMOL/L (ref 3.5–5.1)
PROT SERPL-MCNC: 7.4 G/DL (ref 6.4–8.2)
PROTHROMBIN TIME: 13.1 SEC (ref 11.9–14.6)
RBC # BLD AUTO: 4.04 M/UL (ref 4.5–5.9)
SODIUM SERPL-SCNC: 139 MMOL/L (ref 136–145)
WBC # BLD AUTO: 5.9 K/UL (ref 4.4–11.3)

## 2022-08-16 PROCEDURE — 36415 COLL VENOUS BLD VENIPUNCTURE: CPT

## 2022-08-16 PROCEDURE — 93005 ELECTROCARDIOGRAM TRACING: CPT

## 2022-08-16 PROCEDURE — 80053 COMPREHEN METABOLIC PANEL: CPT

## 2022-08-16 PROCEDURE — 85025 COMPLETE CBC W/AUTO DIFF WBC: CPT

## 2022-08-16 PROCEDURE — 85610 PROTHROMBIN TIME: CPT

## 2022-08-16 RX ORDER — PREGABALIN 100 MG/1
100 CAPSULE ORAL 2 TIMES DAILY
COMMUNITY

## 2022-08-17 LAB
ATRIAL RATE: 83 BPM
CALCULATED P AXIS, ECG09: 32 DEGREES
CALCULATED R AXIS, ECG10: 19 DEGREES
CALCULATED T AXIS, ECG11: 46 DEGREES
DIAGNOSIS, 93000: NORMAL
P-R INTERVAL, ECG05: 136 MS
Q-T INTERVAL, ECG07: 374 MS
QRS DURATION, ECG06: 87 MS
QTC CALCULATION (BEZET), ECG08: 443 MS
VENTRICULAR RATE, ECG03: 84 BPM

## 2022-08-18 ENCOUNTER — HOSPITAL ENCOUNTER (OUTPATIENT)
Age: 42
Discharge: HOME | End: 2022-08-18
Attending: INTERNAL MEDICINE | Admitting: INTERNAL MEDICINE
Payer: COMMERCIAL

## 2022-08-18 VITALS
SYSTOLIC BLOOD PRESSURE: 127 MMHG | TEMPERATURE: 98.3 F | RESPIRATION RATE: 18 BRPM | BODY MASS INDEX: 24.75 KG/M2 | OXYGEN SATURATION: 99 % | WEIGHT: 158 LBS | HEART RATE: 87 BPM | DIASTOLIC BLOOD PRESSURE: 84 MMHG

## 2022-08-18 DIAGNOSIS — R07.9 CHEST PAIN, UNSPECIFIED TYPE: ICD-10-CM

## 2022-08-18 LAB
GLUCOSE BLD STRIP.AUTO-MCNC: 106 MG/DL (ref 65–117)
GLUCOSE BLD STRIP.AUTO-MCNC: 122 MG/DL (ref 65–117)
PERFORMED BY, TECHID: ABNORMAL
PERFORMED BY, TECHID: NORMAL

## 2022-08-18 PROCEDURE — 74011000250 HC RX REV CODE- 250: Performed by: INTERNAL MEDICINE

## 2022-08-18 PROCEDURE — 93005 ELECTROCARDIOGRAM TRACING: CPT

## 2022-08-18 PROCEDURE — 74011000636 HC RX REV CODE- 636: Performed by: INTERNAL MEDICINE

## 2022-08-18 PROCEDURE — 76210000016 HC OR PH I REC 1 TO 1.5 HR: Performed by: INTERNAL MEDICINE

## 2022-08-18 PROCEDURE — 74011250636 HC RX REV CODE- 250/636: Performed by: INTERNAL MEDICINE

## 2022-08-18 PROCEDURE — C1894 INTRO/SHEATH, NON-LASER: HCPCS | Performed by: INTERNAL MEDICINE

## 2022-08-18 PROCEDURE — 77030040934 HC CATH DIAG DXTERITY MEDT -A: Performed by: INTERNAL MEDICINE

## 2022-08-18 PROCEDURE — 76210000021 HC REC RM PH II 0.5 TO 1 HR: Performed by: INTERNAL MEDICINE

## 2022-08-18 PROCEDURE — 99152 MOD SED SAME PHYS/QHP 5/>YRS: CPT | Performed by: INTERNAL MEDICINE

## 2022-08-18 PROCEDURE — C1769 GUIDE WIRE: HCPCS | Performed by: INTERNAL MEDICINE

## 2022-08-18 PROCEDURE — 77030025703 HC SYR ANGI VACLOK MRTM -A: Performed by: INTERNAL MEDICINE

## 2022-08-18 PROCEDURE — 93458 L HRT ARTERY/VENTRICLE ANGIO: CPT | Performed by: INTERNAL MEDICINE

## 2022-08-18 PROCEDURE — 77030029065 HC DRSG HEMO QCLOT ZMED -B: Performed by: INTERNAL MEDICINE

## 2022-08-18 PROCEDURE — 77030029997 HC DEV COM RDL R BND TELE -B: Performed by: INTERNAL MEDICINE

## 2022-08-18 PROCEDURE — 82962 GLUCOSE BLOOD TEST: CPT

## 2022-08-18 PROCEDURE — 77030019698 HC SYR ANGI MDLON MRTM -A: Performed by: INTERNAL MEDICINE

## 2022-08-18 RX ORDER — SODIUM CHLORIDE 0.9 % (FLUSH) 0.9 %
5-40 SYRINGE (ML) INJECTION AS NEEDED
Status: DISCONTINUED | OUTPATIENT
Start: 2022-08-18 | End: 2022-08-18 | Stop reason: HOSPADM

## 2022-08-18 RX ORDER — HEPARIN SODIUM 1000 [USP'U]/ML
INJECTION, SOLUTION INTRAVENOUS; SUBCUTANEOUS AS NEEDED
Status: DISCONTINUED | OUTPATIENT
Start: 2022-08-18 | End: 2022-08-18 | Stop reason: HOSPADM

## 2022-08-18 RX ORDER — FENTANYL CITRATE 50 UG/ML
INJECTION, SOLUTION INTRAMUSCULAR; INTRAVENOUS AS NEEDED
Status: DISCONTINUED | OUTPATIENT
Start: 2022-08-18 | End: 2022-08-18 | Stop reason: HOSPADM

## 2022-08-18 RX ORDER — MIDAZOLAM HYDROCHLORIDE 1 MG/ML
INJECTION INTRAMUSCULAR; INTRAVENOUS AS NEEDED
Status: DISCONTINUED | OUTPATIENT
Start: 2022-08-18 | End: 2022-08-18 | Stop reason: HOSPADM

## 2022-08-18 RX ORDER — NICARDIPINE HYDROCHLORIDE 2.5 MG/ML
INJECTION INTRAVENOUS AS NEEDED
Status: DISCONTINUED | OUTPATIENT
Start: 2022-08-18 | End: 2022-08-18 | Stop reason: HOSPADM

## 2022-08-18 RX ORDER — HEPARIN SODIUM 200 [USP'U]/100ML
INJECTION, SOLUTION INTRAVENOUS
Status: COMPLETED | OUTPATIENT
Start: 2022-08-18 | End: 2022-08-18

## 2022-08-18 RX ORDER — ACETAMINOPHEN 325 MG/1
650 TABLET ORAL
Status: DISCONTINUED | OUTPATIENT
Start: 2022-08-18 | End: 2022-08-18 | Stop reason: HOSPADM

## 2022-08-18 RX ORDER — SODIUM CHLORIDE 0.9 % (FLUSH) 0.9 %
5-40 SYRINGE (ML) INJECTION EVERY 8 HOURS
Status: DISCONTINUED | OUTPATIENT
Start: 2022-08-18 | End: 2022-08-18 | Stop reason: HOSPADM

## 2022-08-18 RX ORDER — SODIUM CHLORIDE 9 MG/ML
50 INJECTION, SOLUTION INTRAVENOUS CONTINUOUS
Status: DISCONTINUED | OUTPATIENT
Start: 2022-08-18 | End: 2022-08-18 | Stop reason: HOSPADM

## 2022-08-18 RX ORDER — DIPHENHYDRAMINE HYDROCHLORIDE 50 MG/ML
INJECTION, SOLUTION INTRAMUSCULAR; INTRAVENOUS AS NEEDED
Status: DISCONTINUED | OUTPATIENT
Start: 2022-08-18 | End: 2022-08-18 | Stop reason: HOSPADM

## 2022-08-18 RX ORDER — LIDOCAINE HYDROCHLORIDE 10 MG/ML
INJECTION INFILTRATION; PERINEURAL AS NEEDED
Status: DISCONTINUED | OUTPATIENT
Start: 2022-08-18 | End: 2022-08-18 | Stop reason: HOSPADM

## 2022-08-18 RX ADMIN — SODIUM CHLORIDE 50 ML/HR: 9 INJECTION, SOLUTION INTRAVENOUS at 12:25

## 2022-08-18 NOTE — Clinical Note
TRANSFER - OUT REPORT:     Verbal report given to: Isaura (at bedside). Report consisted of patient's Situation, Background, Assessment and   Recommendations(SBAR). Opportunity for questions and clarification was provided. Patient transported with a Registered Nurse. Patient transported to: PACU.

## 2022-08-18 NOTE — PROGRESS NOTES
DC instructions reviewed with pt and his mother in law Tigre Keys. Pt states he will sign his own dc instructions.

## 2022-08-18 NOTE — Clinical Note
Contrast Dose Calculator:   Patient's age: 46.   Patient's sex: Male. Patient weight (kg) = 71.7. Creatinine level (mg/dL) = 0.64. Creatinine clearance (mL/min): 157. Contrast concentration (mg/mL) = 370. MACD = 300 mL. Max Contrast dose per Creatinine Cl calculator = 353.25 mL.

## 2022-08-18 NOTE — PROGRESS NOTES
During Preop assessment patient stated that he ate childress, eggs, and toast this morning around 0900. Informed charge nurse Fawn Ramirez who spoke with Mame Ramos and he stated pt procedure would have to be delayed till 1400. Pt made aware by this RN and okay to wait till then for procedure.

## 2022-08-18 NOTE — DISCHARGE INSTRUCTIONS
RADIAL CATHETERIZATION AND INTERVENTIONAL DISCHARGE INSTRUCTIONS       MEDICATIONS:  Take only medications ordered by your provider. ACTIVITIES:  While the wound is healing; bleeding or swelling can occur as a result of stress or strain. Carefully follow these guidelines:    DO NOT DRIVE for 24 hours after the procedure or as instructed by your provider. You may shower 24 hours after your procedure. No soaking the wrist for 3 days (i.e., bath tub, swimming, washing dishes). You may return to work in (***) days. It is essential that you DO NOT SMOKE. Do not bend your wrist for 24 hours. Do not lift more than 3-5 pounds with affected wrist for 1 week. SITE CARE:  Use a clean Band-Aid® for 48 hours. Keep site clean and dry. Avoid lotions, ointments or powders at the wound site for I week. Check the procedural site for any signs of infection (redness, drainage, swelling). You may notice a small, hard lump or small bruise at the puncture site. This is normal and will clear in about 2 weeks. If the lump increases in size; apply firm, direct pressure over the site. Notify your physician. If there is a small amount of bleeding at the site; apply firm, direct continuous pressure over the site with your thumb against the puncture site and your finger against the back of the wrist for 10 minutes. Your nurse will review this with you. Notify your physician. If bleeding does not stop after 10 minutes or if there is a large amount of bleeding, call for an ambulance immediately. Continue to hold pressure until help arrives. WHEN TO CALL YOUR DOCTOR:  Angina - if your angina symptoms return; use your nitroglycerin as instructed by your provider. If you do not have nitroglycerin or if your symptoms do not completely resolve after 10 -15 minutes, call an ambulance.  Do not drive yourself to the hospital.  Warmth, redness, drainage and/or pain at the procedural site or temperature over 101°F.  Persistent tenderness or pain at procedural site. Swelling, coolness, numbness and /or tingling of the fingers, hand, wrist or arm. Lightheadedness, dizziness, fainting or rapid heart beating. If you have any other questions or concerns, or you are experiencing a problem. FOLLOW-UP CARE:  Follow up with your provider and /or cardiologist as recommended. If you had a Stent implanted, carry your Stent ID card with you at all times. Never stop taking your prescribed Brilinta (Ticagrelor),Plavix (Clopidogrel), Aspirin, Coumadin (Warfarin) or Effient (Prasugrel) without speaking with your cardiologist.     I understand and can verbalize these instructions, and have participated in the development of this discharge plan. I have read and received a copy of this plan.

## 2022-08-19 LAB
ATRIAL RATE: 83 BPM
CALCULATED P AXIS, ECG09: 35 DEGREES
CALCULATED R AXIS, ECG10: 7 DEGREES
CALCULATED T AXIS, ECG11: 31 DEGREES
DIAGNOSIS, 93000: NORMAL
P-R INTERVAL, ECG05: 142 MS
Q-T INTERVAL, ECG07: 360 MS
QRS DURATION, ECG06: 82 MS
QTC CALCULATION (BEZET), ECG08: 423 MS
VENTRICULAR RATE, ECG03: 83 BPM

## 2022-08-19 PROCEDURE — 93010 ELECTROCARDIOGRAM REPORT: CPT | Performed by: INTERNAL MEDICINE

## 2022-08-31 ENCOUNTER — HOSPITAL ENCOUNTER (OUTPATIENT)
Dept: PHYSICAL THERAPY | Age: 42
Discharge: HOME | End: 2022-08-31
Payer: COMMERCIAL

## 2022-08-31 PROCEDURE — 97165 OT EVAL LOW COMPLEX 30 MIN: CPT

## 2022-08-31 NOTE — THERAPY EVALUATION
OT INITIAL EVALUATION NOTE 2-15    Patient Name: Idania Chopra. Date:2022  : 1980  [x]  Patient  Verified  Payor: [address] / Plan: [address] / Product Type: Managed Care Medicaid /    In time:10:50  Out time:11:55  Total Treatment Time (min): 65   Total Timed Codes (min): 65  1:1 Treatment Time ([facility] only): 72   Visit #: 1 of 1    Treatment Area: Carpal tunnel syndrome, bilateral upper limbs [G56.03]    SUBJECTIVE  Pain Level (0-10 scale): 4/10  Any medication changes, allergies to medications, adverse drug reactions, diagnosis change, or new procedure performed?: [x] No    [] Yes (see summary sheet for update)  Subjective functional status/changes:   [] No changes reported  Patient reports he has had several injuries to right forearm, starting in childhood with fracture that was casted, no surgical intervention. Patient reports he has had pain in bilateral UE and had difficulty performing tasks at work due to pain, numbness, and tingling. Patient reports he has been to ER recently for right forearm injury at work resulting in pain and splint from MD to wear daily. Patient reports he most recently went to [facility] with diagnosis of carpal tunnel syndrome and provided with new splints to wear during the day. Patient reports he relies on daughter and fiance' for assist with meal prep and housekeeping tasks due to pain, weakness, and numbness. Patient is right hand dominant and is currently unable to utilize bilateral hand tasks and any tasks that require heavy lifting. PLOF: Pt reports he completes all ADL and IADL tasks independently with SPC occasionally due to weakness and numbness in bilateral feet, ankles, and lower legs. Mechanism of Injury: bilateral carpal tunnel  Previous Treatment/Compliance: n/a  Work Hx: Pt has worked at [employer] with heavy lifting and reaching tasks. Pt has worked at [employer] and was performing heaving lifting tasks and reaching.  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 Living Situation: Pt lives with joanna' and children in 1 story home. Daughter is helpful in assisting with household management. Pt Goals: \"Get stronger and get the pain down. \"  Motivation: Good  Substance use: None reported  Cognition: A&O x 4  Fall Assessment: No falls risk assessment indicated at this time.   Past Medical History:  Past Medical History:   Diagnosis Date    Chest discomfort     Diabetes mellitus ([facility] 12/16/2019    Type II    Di[facility]     feet    Hyperlipidemia associated with type 2 diabetes mellitus ([facility]      Past Surgical History:  Past Surgical History:   Procedure Laterality Date    HX APPENDECTOMY       Current Medications:  Current Outpatient Medications   Medication Instructions    insulin glargine (LANTUS,BASAGLAR) 100 unit/mL (3 mL) inpn Inject 30 units every morning    metFORMIN (GLUCOPHAGE) 1,000 mg tablet Take 1 tablet by mouth twice daily    pioglitazone (ACTOS) 30 mg, Oral, DAILY    pregabalin (LYRICA) 100 mg, Oral, 2 TIMES DAILY    rosuvastatin (CRESTOR) 10 mg, Oral, EVERY BEDTIME         OBJECTIVE      With   [] TE   [] TA   [] neuro   [] other: Patient Education: [x] Providing HEP    [] Progressed/Changed HEP based on:   [] positioning   [] body mechanics   [] transfers   [x] heat/ice application   [x] Splint wear/care   [] Sensory re-education   [] scar management      [] other:            Other Objective/Functional Measures:     Skin: none  Edema: occasional per pt  Circumference measurements-     Vision/Perceptual:    none    Coordination: BUE                WFL          Impaired    Gross Motor  X   Fine Motor   X   Crossing the Midline  X    Bilateral Integration  X    Praxis X    Dexterity  X   Visual Motor X          9 hole peg test:   R- 22 seconds   L- 20 seconds      Strength: BUE                                                  3pt pinch            2pt pinch              Lateral pinch  Right 50 8 8 11   Left 49 8 7 9         Tone & Sensation: BUE  Pt with decreased sensation in bilateral hands/digits, with numbness and tingling in digits. Range of Motion: BUE     RUE   AROM R UE PROM *Goal L UE AROM L UE   PROM *Goal   Shoulder flexion          Shoulder abduction          Elbow flexion     132 132 145   Elbow extension          Forearm supination  48 48 80 28 28 80   Forearm pronation  [facility]     Wrist flexion  65 65 75 72 72 80   Wrist extension  35 35 65 48 48 70   Radial Dev   14 14 18 12 12 18   Ulnar Deviation   30 30 30 30 30 30       Pain Level (0-10 scale) post treatment: 4/10    ASSESSMENT/Changes in Function: Patient is a young 46year old male who presents to occupational therapy with diagnosis of bilateral carpal tunnel syndrome. Patient reports pain in right forearm that makes daily activities difficult.  Patient reports     [x]  See Plan of [facility], MSOTR/L, 8/31/2022

## 2022-09-02 NOTE — THERAPY EVALUATION
[address]  [place], [address]  Ph: [phone]    Fax: [phone]    Plan of Care/Statement of Necessity for Occupational Therapy Services      Patient name: Parul Carrillo. Start of Care: 2022   Referral source: Arabella Reyes MD : 1980    Medical Diagnosis: Carpal tunnel syndrome, bilateral upper limbs [G56.03]   Onset Date:22    Treatment Diagnosis: carpal tunnel   Prior Hospitalization: see medical history Provider#: [id number]   Medications: Verified on Patient summary List    Comorbidities: DM, DM Peripheral neuropathy   Prior Level of Function: Prior to onset of symptoms patient completed all ADL and IADL tasks independently using no device. The Plan of Care and following information is based on the information from the initial evaluation. Assessment/ key information: Patient seen this date for occupational therapy evaluation. Patient presents with decreased strength in bilateral  and pinch strength, decreased ROM in wrist including pro/sup, flex/ext, and radial/ulnar deviation leading to difficulty completing daily tasks including laundry, meal prep, and housekeeping tasks. Patient reports numbness and tingling in bilateral hands, all digits, and bilateral forearm, right greater than left. Patient reports he is unable to work due to UE weakness as well as issues with bilateral feet and lower extremities.      Evaluation Complexity: History LOW Complexity : Brief history review  Examination LOW Complexity : 1-3 performance deficits relating to physical, cognitive , or psychosocial skils that result in activity limitations and / or participation restrictions  Clinical Decision Making LOW Complexity : No comorbidities that affect functional and no verbal or physical assistance needed to complete eval tasks   Overall Complexity Rating: LOW     Problem List: Pain effecting function, Decreased range of motion, Decreased strength, Edema effecting function, Decreased coordination/prehension, Decreased ADL/functional abilities , Decreased activity tolerance, and Decreased flexibility/joint mobility     Treatment Plan may include any combination of the following: Therapeutic exercise, Therapeutic activities, Neuromuscular re-education, Physical agent/modality, Manual therapy, Patient education, and ADLs/IADLs    Patient / Family readiness to learn indicated by: interest    Persons(s) to be included in education:   patient (P) and family support person (FSP); fiance', daughter    Barriers to Learning/Limitations: None    Patient Goal (s): \"Get stronger and get the pain down. \"    Patient Self Reported Health Status: good    Rehabilitation Potential: good    Short Term Goals: To be accomplished in 5 treatments:  Patient will demonstrate independence and compliance with HEP in order to assist with carryover from OT services. 2.   Patient will increase R forearm supination from 28 degrees to 50 degrees to improve right UE function. 3.   Patient will increase L forearm supination from 48 degrees to 60 degrees to improve left UE function. 4.   Patient will increase R wrist flexion and extension by 10 degrees to improve right UE function. 5.   Patient will increase L wrist flexion and extension by 10 degrees to improve left right UE function. 6.   Patient will increase R  strength by 5# to improve R hand function. 7.   Patient will increase L  strength by 5# to improve L hand function. Long Term Goals: To be accomplished in 10 treatments:   Patient will demonstrate independence and compliance with HEP in order to assist with carryover from OT services. 2.   Patient will increase R forearm supination from 50 degrees to 85 degrees to improve right UE function. 3.   Patient will increase L forearm supination from 60 degrees to 85 degrees to improve left UE function.    4.   Patient will increase R wrist flexion and extension by 20 degrees to improve right UE function. 5.   Patient will increase L wrist flexion and extension by 20 degrees to improve left right UE function. 6.   Patient will increase R  strength by 15# to improve R hand function. 7.   Patient will increase L  strength by 15# to improve L hand function. Frequency / Duration: Patient to be seen 2 times per week for 10 treatments:    Patient/ Caregiver education and instruction: Diagnosis, prognosis, self care, activity modification, brace/ splint application, and exercises  [x]  Plan of care has been reviewed with [address] [place] [address] MSOTR/L, 8/31/2022     ________________________________________________________________________    I certify that the above Therapy Services are being furnished while the patient is under my care. I agree with the treatment plan and certify that this therapy is necessary.     [de-identified] Signature:____________________Date:____________Time:__________           Arabella Reyes MD        Please sign and return to:   [facility]  [address] [place] [address]'S AND [facility], [address]  Ph: [phone]    Fax: [phone]

## 2022-09-12 ENCOUNTER — APPOINTMENT (OUTPATIENT)
Dept: PHYSICAL THERAPY | Age: 42
End: 2022-09-12
Payer: MEDICAID

## 2022-09-13 ENCOUNTER — HOSPITAL ENCOUNTER (OUTPATIENT)
Dept: PHYSICAL THERAPY | Age: 42
Discharge: HOME | End: 2022-09-13
Payer: MEDICAID

## 2022-09-13 PROCEDURE — 97530 THERAPEUTIC ACTIVITIES: CPT

## 2022-09-13 PROCEDURE — 97110 THERAPEUTIC EXERCISES: CPT

## 2022-09-13 NOTE — PROGRESS NOTES
OT DAILY TREATMENT NOTE  3-16    Patient Name: Tee Smiley. Date:2022  : 1980  [x]  Patient  Verified  Payor: [facility] MEDICAID / Plan: [facility] [place] [facility] PLUS / Product Type: Managed Care Medicaid /    In time:1005  Out time:1100  Total Treatment Time (min): 54  Visit #: 1 of 15    Treatment Area: Carpal tunnel syndrome, bilateral upper limbs [G56.03]    SUBJECTIVE  Pain Level (0-10 scale): 8/10 in hands  Any medication changes, allergies to medications, adverse drug reactions, diagnosis change, or new procedure performed?: [x] No    [] Yes (see summary sheet for update)  Subjective functional status/changes:   [] No changes reported  \"I had the tests done and they said I had carpal tunnel and neuropathy. \" Patient reports they tested the right hand and right foot because they are the worst for pain. Patient reports he returns to [place]  to discuss test results and make a plan for potential injections. OBJECTIVE    45 min Therapeutic Exercise:  [x] See flow sheet :   Rationale: increase ROM, increase strength, improve coordination, and decrease pain  to improve the patients ability to complete ADL and IADL tasks independently. 10 min Therapeutic Activity:  []  See flow sheet :   Rationale: increase ROM, increase strength, improve coordination, and decrease pain  to improve the patients ability to increase safety and independence with ADL and IADL tasks. With   [x] TE   [x] TA   [] neuro   [] other: Patient Education: [x] Review HEP    [] Progressed/Changed HEP based on:   [] positioning   [] body mechanics   [] transfers   [] heat/ice application   [] Splint wear/care   [] Sensory re-education   [] scar management      [] other:         Pain Level (0-10 scale) post treatment: 4/10, \"I can feel that I've been working the muscles. \"    ASSESSMENT/Changes in Function: Patient reports some pain with stretches, along left thumb and right forearm.  Patient reports continued numbness in bilateral hands and digits. Patient observed to have crepitus sounds from left hand > right during AROM and stretches. Patient will continue to benefit from skilled OT services to modify and progress therapeutic interventions, address functional mobility deficits, address ROM deficits, and address strength deficits to attain remaining goals. [x]  See Plan of Care  []  See progress note/recertification  []  See Discharge Summary         Progress towards goals / Updated goals:  Short Term Goals: To be accomplished in 5 treatments:  Patient will demonstrate independence and compliance with HEP in order to assist with carryover from OT services. 2.   Patient will increase R forearm supination from 28 degrees to 50 degrees to improve right UE function. 3.   Patient will increase L forearm supination from 48 degrees to 60 degrees to improve left UE function. 4.   Patient will increase R wrist flexion and extension by 10 degrees to improve right UE function. 5.   Patient will increase L wrist flexion and extension by 10 degrees to improve left right UE function. 6.   Patient will increase R  strength by 5# to improve R hand function. 7.   Patient will increase L  strength by 5# to improve L hand function. Long Term Goals: To be accomplished in 10 treatments:     Patient will demonstrate independence and compliance with HEP in order to assist with carryover from OT services. 2.   Patient will increase R forearm supination from 50 degrees to 85 degrees to improve right UE function. 3.   Patient will increase L forearm supination from 60 degrees to 85 degrees to improve left UE function. 4.   Patient will increase R wrist flexion and extension by 20 degrees to improve right UE function. 5.   Patient will increase L wrist flexion and extension by 20 degrees to improve left right UE function. 6.   Patient will increase R  strength by 15# to improve R hand function.    7.   Patient will increase L  strength by 15# to improve L hand function.         PLAN  [x]  Upgrade activities as tolerated     [x]  Continue plan of care  []  Update interventions per flow sheet       []  Discharge due to:_  []  Other:_      ANIRUDH Vu/ISABEL, 9/13/2022

## 2022-09-16 ENCOUNTER — HOSPITAL ENCOUNTER (OUTPATIENT)
Dept: PHYSICAL THERAPY | Age: 42
Discharge: HOME | End: 2022-09-16
Payer: MEDICAID

## 2022-09-16 PROCEDURE — 97530 THERAPEUTIC ACTIVITIES: CPT

## 2022-09-16 PROCEDURE — 97110 THERAPEUTIC EXERCISES: CPT

## 2022-09-16 NOTE — PROGRESS NOTES
OT DAILY TREATMENT NOTE  3    Patient Name: Nina Aceves. Date:2022  : 1980  [x]  Patient  Verified  Payor: [facility] MEDICAID / Plan: [facility] / Product Type: Managed Care Medicaid /    In time:1100  Out time:1200  Total Treatment Time (min): 60  Visit #: 2 of 12    Treatment Area: Carpal tunnel syndrome, bilateral upper limbs [G56.03]    SUBJECTIVE  Pain Level (0-10 scale): 4/10  Any medication changes, allergies to medications, adverse drug reactions, diagnosis change, or new procedure performed?: [x] No    [] Yes (see summary sheet for update)  Subjective functional status/changes:   [] No changes reported  \"It hurt that evening after I was here. Its a slight annoying pain. \" Patient reports after fishing both hands were painful and aching for the rest of the evening. Patient reports he has follow-up for cardiovascular testing on . Patient reports his right wrist is sore/tight from needle stick site. OBJECTIVE    50 min Therapeutic Exercise:  [x] See flow sheet :   Rationale: increase ROM, increase strength, and improve coordination to improve the patients ability to increase independence with ADL/IADL tasks. 10 min Therapeutic Activity:  [x]  See flow sheet :   Rationale: increase ROM, increase strength, and improve coordination  to improve the patients ability to increase independence with ADL/IADL tasks. With   [x] TE   [x] TA   [] neuro   [] other: Patient Education: [x] Review HEP    [x] Progressed/Changed HEP based on:   [] positioning   [] body mechanics   [] transfers   [] heat/ice application   [] Splint wear/care   [] Sensory re-education   [] scar management      [] other:         Pain Level (0-10 scale) post treatment: 5/10 along thumb and forearm     ASSESSMENT/Changes in Function: Patient tolerates treatment well, reports some pulling and stretch along thumb side of forearm.  Patient will continue to benefit from skilled OT services to modify and progress therapeutic interventions, address functional mobility deficits, address ROM deficits, address strength deficits, and instruct in home and community integration to attain remaining goals. [x]  See Plan of Care  []  See progress note/recertification  []  See Discharge Summary         Progress towards goals / Updated goals:  Short Term Goals: To be accomplished in 5 treatments:  Patient will demonstrate independence and compliance with HEP in order to assist with carryover from OT services. 2.   Patient will increase R forearm supination from 28 degrees to 50 degrees to improve right UE function. 3.   Patient will increase L forearm supination from 48 degrees to 60 degrees to improve left UE function. 4.   Patient will increase R wrist flexion and extension by 10 degrees to improve right UE function. 5.   Patient will increase L wrist flexion and extension by 10 degrees to improve left right UE function. 6.   Patient will increase R  strength by 5# to improve R hand function. 7.   Patient will increase L  strength by 5# to improve L hand function. Long Term Goals: To be accomplished in 10 treatments:     Patient will demonstrate independence and compliance with HEP in order to assist with carryover from OT services. 2.   Patient will increase R forearm supination from 50 degrees to 85 degrees to improve right UE function. 3.   Patient will increase L forearm supination from 60 degrees to 85 degrees to improve left UE function. 4.   Patient will increase R wrist flexion and extension by 20 degrees to improve right UE function. 5.   Patient will increase L wrist flexion and extension by 20 degrees to improve left right UE function. 6.   Patient will increase R  strength by 15# to improve R hand function. 7.   Patient will increase L  strength by 15# to improve L hand function.      PLAN  [x]  Upgrade activities as tolerated     [x]  Continue plan of care  []  Update interventions per flow sheet       []  Discharge due to:_  []  Other:_      Karina Arriaga MSOTR/L, 9/16/2022

## 2022-09-21 ENCOUNTER — APPOINTMENT (OUTPATIENT)
Dept: PHYSICAL THERAPY | Age: 42
End: 2022-09-21
Payer: MEDICAID

## 2022-09-23 ENCOUNTER — HOSPITAL ENCOUNTER (OUTPATIENT)
Dept: PHYSICAL THERAPY | Age: 42
Discharge: HOME | End: 2022-09-23
Payer: MEDICAID

## 2022-09-23 PROCEDURE — 97110 THERAPEUTIC EXERCISES: CPT

## 2022-09-23 NOTE — PROGRESS NOTES
OT DAILY TREATMENT NOTE  3-16    Patient Name: Daniel Tapia. Date:2022  : 1980  [x]  Patient  Verified  Payor: [facility] MEDICAID / Plan: [facility] [place] / Product Type: Managed Care Medicaid /    In time:950  Out time:1046  Total Treatment Time (min): 64  Visit #: 3 of 15    Treatment Area: Carpal tunnel syndrome, bilateral upper limbs [G56.03]    SUBJECTIVE  Pain Level (0-10 scale): 4/10  Any medication changes, allergies to medications, adverse drug reactions, diagnosis change, or new procedure performed?: [x] No    [] Yes (see summary sheet for update)  Subjective functional status/changes:   [] No changes reported  \"I was playing a video game and had to stop because my thumb got tight. \"     OBJECTIVE    56 min Therapeutic Exercise:  [x] See flow sheet :   Rationale: increase ROM, increase strength, and improve coordination to improve the patients ability to complete ADL/IADL tasks safely and independently. With   [x] TE   [] TA   [] neuro   [] other: Patient Education: [x] Review HEP    [] Progressed/Changed HEP based on:   [] positioning   [] body mechanics   [] transfers   [] heat/ice application   [] Splint wear/care   [] Sensory re-education   [] scar management      [] other:         Pain Level (0-10 scale) post treatment: 4/10    ASSESSMENT/Changes in Function: Patient presents with 4-5/10 pain in bilateral hands, right slightly worse than left. Patient reports his thumbs have been sore after activities at home that involve thumb ROM, especially flexion and adduction. Patient requires rest breaks throughout exercises to complete due to pain. Patient will continue to benefit from skilled OT services to modify and progress therapeutic interventions, address functional mobility deficits, address ROM deficits, address strength deficits, and instruct in home and community integration to attain remaining goals.      [x]  See Plan of Care  []  See progress note/recertification  []  See Discharge Summary         Progress towards goals / Updated goals:  Patient will demonstrate independence and compliance with HEP in order to assist with carryover from OT services. 2.   Patient will increase R forearm supination from 28 degrees to 50 degrees to improve right UE function. 3.   Patient will increase L forearm supination from 48 degrees to 60 degrees to improve left UE function. 4.   Patient will increase R wrist flexion and extension by 10 degrees to improve right UE function. 5.   Patient will increase L wrist flexion and extension by 10 degrees to improve left right UE function. 6.   Patient will increase R  strength by 5# to improve R hand function. 7.   Patient will increase L  strength by 5# to improve L hand function. Long Term Goals: To be accomplished in 10 treatments:     Patient will demonstrate independence and compliance with HEP in order to assist with carryover from OT services. 2.   Patient will increase R forearm supination from 50 degrees to 85 degrees to improve right UE function. 3.   Patient will increase L forearm supination from 60 degrees to 85 degrees to improve left UE function. 4.   Patient will increase R wrist flexion and extension by 20 degrees to improve right UE function. 5.   Patient will increase L wrist flexion and extension by 20 degrees to improve left right UE function. 6.   Patient will increase R  strength by 15# to improve R hand function. 7.   Patient will increase L  strength by 15# to improve L hand function.      PLAN  [x]  Upgrade activities as tolerated     [x]  Continue plan of care  []  Update interventions per flow sheet       []  Discharge due to:_  []  Other:_      ANIRUDH Clement/ISABEL, 9/23/2022

## 2022-09-30 ENCOUNTER — HOSPITAL ENCOUNTER (OUTPATIENT)
Dept: PHYSICAL THERAPY | Age: 42
Discharge: HOME | End: 2022-09-30
Payer: MEDICAID

## 2022-09-30 PROCEDURE — 97110 THERAPEUTIC EXERCISES: CPT

## 2022-09-30 NOTE — PROGRESS NOTES
Rossi Hernandez RN       OT DAILY TREATMENT NOTE  3-    Patient Name: Flako Smiley. Date:2022  : 1980  [x]  Patient  Verified  Payor: [facility] MEDICAID / Plan: [facility] [email or web address] PLUS / Product Type: Managed Care Medicaid /    In time:1000  Out time:1100  Total Treatment Time (min): 1100  Visit #: 4 of 15    Treatment Area: Carpal tunnel syndrome, bilateral upper limbs [G56.03]    SUBJECTIVE  Pain Level (0-10 scale): 5/10  Any medication changes, allergies to medications, adverse drug reactions, diagnosis change, or new procedure performed?: [x] No    [] Yes (see summary sheet for update)  Subjective functional status/changes:   [] No changes reported  \"My hands have been good this week. \"     OBJECTIVE    60 min Therapeutic Exercise:  [x] See flow sheet :   Rationale: increase ROM, increase strength, and improve coordination to improve the patients ability to complete ADL/IADL tasks independently without pain. With   [x] TE   [x] TA   [] neuro   [] other: Patient Education: [x] Review HEP    [] Progressed/Changed HEP based on:   [] positioning   [] body mechanics   [] transfers   [] heat/ice application   [] Splint wear/care   [] Sensory re-education   [] scar management      [] other:      Pain Level (0-10 scale) post treatment: 5/10    ASSESSMENT/Changes in Function: Patient reports decreased numbness and tingling in bilateral hands this week. Patient reports some numbness starting in thumb and 2nd digit starting last night until today. Patient reports he has not been playing video games this week. Patient will continue to benefit from skilled OT services to modify and progress therapeutic interventions, address functional mobility deficits, address ROM deficits, address strength deficits, analyze and modify body mechanics/ergonomics, and instruct in home and community integration to attain remaining goals.      [x]  See Plan of Care  []  See progress note/recertification  []  See Discharge Summary Progress towards goals / Updated goals:  Patient will demonstrate independence and compliance with HEP in order to assist with carryover from OT services. 2.   Patient will increase R forearm supination from 28 degrees to 50 degrees to improve right UE function. 3.   Patient will increase L forearm supination from 48 degrees to 60 degrees to improve left UE function. 4.   Patient will increase R wrist flexion and extension by 10 degrees to improve right UE function. 5.   Patient will increase L wrist flexion and extension by 10 degrees to improve left right UE function. 6.   Patient will increase R  strength by 5# to improve R hand function. 7.   Patient will increase L  strength by 5# to improve L hand function. Long Term Goals: To be accomplished in 10 treatments:     Patient will demonstrate independence and compliance with HEP in order to assist with carryover from OT services. 2.   Patient will increase R forearm supination from 50 degrees to 85 degrees to improve right UE function. 3.   Patient will increase L forearm supination from 60 degrees to 85 degrees to improve left UE function. 4.   Patient will increase R wrist flexion and extension by 20 degrees to improve right UE function. 5.   Patient will increase L wrist flexion and extension by 20 degrees to improve left right UE function. 6.   Patient will increase R  strength by 15# to improve R hand function. 7.   Patient will increase L  strength by 15# to improve L hand function.      PLAN  [x]  Upgrade activities as tolerated     [x]  Continue plan of care  []  Update interventions per flow sheet       []  Discharge due to:_  []  Other:_      Colette Gutierrez, ANIRUDH/ISABEL, 9/30/2022

## 2022-10-07 ENCOUNTER — HOSPITAL ENCOUNTER (OUTPATIENT)
Dept: PHYSICAL THERAPY | Age: 42
Discharge: HOME | End: 2022-10-07
Payer: MEDICAID

## 2022-10-07 PROCEDURE — 97110 THERAPEUTIC EXERCISES: CPT

## 2022-10-07 NOTE — PROGRESS NOTES
[address]  [place], [place]  Ph: [phone]    Fax: [phone]    Progress Note    Name: Daysi rBadley. : 1980   MD: Avery Lanier MD       Treatment Diagnosis: Carpal tunnel syndrome, bilateral upper limbs [G56.03]  Start of Care: 2022    Visits from Start of Care: 5   Missed Visits: 0    Summary of Care / Assessment / Recommendations:   Patient seen this date for Occupational Therapy reassessment visit. Patient arrives with reported 10/10 pain that kept him from sleeping well last night. Hot packs applied at start of visit to ease pain prior to assessments. Patient progressing slowly with OT goals. Patient increased  strength in right hand from 50 to 58#, and in left hand from 49 to 58#. Patient increased right 2-point, 3-point and lateral pinch strength by 5# in all positions. Patient increased left 3-point pinch by 6#, 2-point pinch by 4#, and lateral pinch by 9# from start of care. Patient increased left elbow flexion by 18 degrees, left forearm supination by 52 degrees, and right forearm supination by 24 degrees without complaints of increased pain. Patient reports continued difficulty with lifting and carrying items at home in bilateral hands, right worse than left. Patient reports continued numbness and tingling, and occasional shooting pain in right forearm > left. Patient will continue to benefit from skilled OT services to modify and progress therapeutic interventions, address functional mobility deficits, address ROM deficits, address strength deficits, analyze and cue movement patterns, and decrease pain to attain remaining goals. Progress Toward Goals:  Progress towards goals / Updated goals: To be accomplished in 5 treatments:  1. Patient will increase R forearm supination from 28 degrees to 50 degrees to improve right UE function. -MET  2.    Patient will increase L forearm supination from 48 degrees to 60 degrees to improve left UE function. -MET  3. Patient will increase R wrist flexion and extension by 10 degrees to improve right UE function. -PROGRESSING  4. Patient will increase L wrist flexion and extension by 10 degrees to improve left right UE function. -PROGRESSING  5. Patient will increase R  strength by 5# to improve R hand function. -MET  6. Patient will increase L  strength by 5# to improve L hand function. -MET      Long Term Goals: To be accomplished in 10 treatments:     Patient will demonstrate independence and compliance with HEP in order to assist with carryover from OT services. -MET  2. Patient will increase R forearm supination from 50 degrees to 85 degrees to improve right UE function. -PROGRESSING  3. Patient will increase L forearm supination from 60 degrees to 85 degrees to improve left UE function. -PROGRESSING  4. Patient will increase R wrist flexion and extension by 20 degrees to improve right UE function. -NOT MET  5. Patient will increase L wrist flexion and extension by 20 degrees to improve left right UE function. -NOT MET   6. Patient will increase R  strength by 15# to improve R hand function. -NOT MET   7. Patient will increase L  strength by 15# to improve L hand function. -NOT MET     Frequency/Duration:  1 treatments per week, for 5 weeks. Chiqui Patino, MSOTR/L, 10/7/2022         ________________________________________________________________________  NOTE TO PHYSICIAN:  Please complete the following and fax to:  [facility]:   Fax: [phone]  . Retain this original for your records. If you are unable to process this request in 24 hours, please contact our office.        ____ I have read the above report and request that my patient continue therapy with the following changes/special instructions:  ____ I have read the above report and request that my patient be discharged from therapy    Physician's Signature:_________________ Date:___________Time:__________

## 2022-10-07 NOTE — PROGRESS NOTES
OT DAILY TREATMENT NOTE  3-16    Patient Name: Luís Song. Date:10/7/2022  : 1980  [x]  Patient  Verified  Payor: [facility] MEDICAID / Plan: [facility] [employer] PLUS / Product Type: Managed Care Medicaid /    In time:1008  Out time:1110  Total Treatment Time (min): 62  Visit #: 5 of 6    Treatment Area: Carpal tunnel syndrome, bilateral upper limbs [G56.03]    SUBJECTIVE  Pain Level (0-10 scale): 10/10  Any medication changes, allergies to medications, adverse drug reactions, diagnosis change, or new procedure performed?: [x] No    [] Yes (see summary sheet for update)  Subjective functional status/changes:   [] No changes reported  \"I'm really hurting today. I got about 2 hours of sleep last night because my feet were hurting and hands were throbbing. \"   Patient returns to MD 10/27 for follow-up with orthopedic. OBJECTIVE    Modality rationale: decrease edema, decrease inflammation, decrease pain, and increase tissue extensibility to improve the patients ability to increase bilateral hand function.     Min Type Additional Details    [] Estim:  []Unatt       []IFC  []Premod                        []Other:  []w/ice   []w/heat  Position:  Location:    [] Estim: []Att    []TENS instruct  []NMES                    []Other:  []w/US   []w/ice   []w/heat  Position:  Location:    []  Traction: [] Cervical       []Lumbar                       [] Prone          []Supine                       []Intermittent   []Continuous Lbs:  [] before manual  [] after manual    []  Ultrasound: []Continuous   [] Pulsed                           []1MHz   []3MHz W/cm2:  Location:    []  Iontophoresis with dexamethasone         Location: [] Take home patch   [] In clinic     10 []  Ice     [x]  heat  []  Ice massage  []  Laser   []  Anodyne Position: pronation  Location: bilateral wrists and hands    []  Laser with stim  []  Other:  Position:  Location:    []  Vasopneumatic Device Pressure:       [] lo [] med [] hi Temperature: [] lo [] med [] hi   [x] Skin assessment post-treatment:  [x]intact [x]redness- no adverse reaction    []redness - adverse reaction:     52 min Therapeutic Exercise:  [x] See flow sheet :   Rationale: increase ROM, increase strength, and improve coordination to improve the patients ability to complete ADL/IADL tasks independently and without pain. With   [x] TE   [x] TA   [] neuro   [] other: Patient Education: [x] Review HEP    [] Progressed/Changed HEP based on:   [] positioning   [] body mechanics   [] transfers   [] heat/ice application   [] Splint wear/care   [] Sensory re-education   [] scar management      [] other:           Other Objective/Functional Measures:      Objective:    9 hole peg test:   R- 27 seconds             L- 23 seconds     Strength: BUE                             3           2           Lat  Right 58 15 13 17   Left 58 14 11 18      Tone & Sensation: BUE  Pt with continued numbness and tingling in bilateral hands and forearms, and continued pain. Range of Motion: BUE       RUE   AROM R UE PROM *Goal L UE AROM L UE   PROM *Goal   Elbow flexion        150 150 145   Elbow extension   [facility]        Forearm supination  72 72 80 80 80 80   Forearm pronation  [facility]       Wrist flexion  65 65 75 65 65 80   Wrist extension  48 48 65 46 46 70   Radial Dev    18 18 18 12 12 18   Ulnar Deviation   30 30 30 30 30 30        Pain Level (0-10 scale) post treatment: 7/10    ASSESSMENT/Changes in Function: Patient seen this date for Occupational Therapy reassessment visit. Patient arrives with reported 10/10 pain that kept him from sleeping well last night. Hot packs applied at start of visit to ease pain prior to assessments. Patient progressing slowly with OT goals. Patient increased  strength in right hand from 50 to 58#, and in left hand from 49 to 58#. Patient increased right 2-point, 3-point and lateral pinch strength by 5# in all positions.  Patient increased left 3-point pinch by 6#, 2-point pinch by 4#, and lateral pinch by 9# from start of care. Patient increased left elbow flexion by 18 degrees, left forearm supination by 52 degrees, and right forearm supination by 24 degrees without complaints of increased pain. Patient reports continued difficulty with lifting and carrying items at home in bilateral hands, right worse than left. Patient reports continued numbness and tingling, and occasional shooting pain in right forearm > left. Patient will continue to benefit from skilled OT services to modify and progress therapeutic interventions, address functional mobility deficits, address ROM deficits, address strength deficits, analyze and cue movement patterns, and decrease pain  to attain remaining goals. [x]  See Plan of Care  []  See progress note/recertification  []  See Discharge Summary         Progress towards goals / Updated goals: To be accomplished in 5 treatments:  1. Patient will increase R forearm supination from 28 degrees to 50 degrees to improve right UE function. -MET  2. Patient will increase L forearm supination from 48 degrees to 60 degrees to improve left UE function. -MET  3. Patient will increase R wrist flexion and extension by 10 degrees to improve right UE function. -PROGRESSING  4. Patient will increase L wrist flexion and extension by 10 degrees to improve left right UE function. -PROGRESSING  5. Patient will increase R  strength by 5# to improve R hand function. -MET  6. Patient will increase L  strength by 5# to improve L hand function. -MET      Long Term Goals: To be accomplished in 10 treatments:     Patient will demonstrate independence and compliance with HEP in order to assist with carryover from OT services. -MET  2. Patient will increase R forearm supination from 50 degrees to 85 degrees to improve right UE function. -PROGRESSING  3.    Patient will increase L forearm supination from 60 degrees to 85 degrees to improve left UE function. -PROGRESSING  4. Patient will increase R wrist flexion and extension by 20 degrees to improve right UE function. -NOT MET  5. Patient will increase L wrist flexion and extension by 20 degrees to improve left right UE function. -NOT MET   6. Patient will increase R  strength by 15# to improve R hand function. -NOT MET   7. Patient will increase L  strength by 15# to improve L hand function.  -NOT MET     PLAN  [x]  Upgrade activities as tolerated     [x]  Continue plan of care  [x]  Update interventions per flow sheet       []  Discharge due to:_  []  Other:_      ANIRUDH Solorzano/ISABEL, 10/7/2022

## 2022-10-13 ENCOUNTER — APPOINTMENT (OUTPATIENT)
Dept: PHYSICAL THERAPY | Age: 42
End: 2022-10-13
Payer: MEDICAID

## 2022-10-19 ENCOUNTER — HOSPITAL ENCOUNTER (OUTPATIENT)
Dept: PHYSICAL THERAPY | Age: 42
Discharge: HOME | End: 2022-10-19
Payer: MEDICAID

## 2022-10-19 PROCEDURE — 97110 THERAPEUTIC EXERCISES: CPT

## 2022-10-19 NOTE — PROGRESS NOTES
OT DAILY TREATMENT NOTE  3-    Patient Name: Francisca Valenzuela. Date:10/19/2022  : 1980  [x]  Patient  Verified  Payor: [facility] MEDICAID / Plan: [facility] [employer] PLUS / Product Type: Managed Care Medicaid /    In time:1000  Out time:1100  Total Treatment Time (min): 60  Visit #: 7 of 15    Treatment Area: Carpal tunnel syndrome, bilateral upper limbs [G56.03]    SUBJECTIVE  Pain Level (0-10 scale): 5/10  Any medication changes, allergies to medications, adverse drug reactions, diagnosis change, or new procedure performed?: [x] No    [] Yes (see summary sheet for update)  Subjective functional status/changes:   [] No changes reported  \"I helped a friend move some tables and my hands were really sore on . \"    OBJECTIVE    60 min Therapeutic Exercise:  [x] See flow sheet :   Rationale: increase ROM, increase strength, and improve coordination to improve the patients ability to complete ADL/IADL tasks independently and without pain. With   [x] TE   [] TA   [] neuro   [] other: Patient Education: [x] Review HEP    [] Progressed/Changed HEP based on:   [] positioning   [] body mechanics   [] transfers   [] heat/ice application   [] Splint wear/care   [] Sensory re-education   [] scar management      [] other:         Pain Level (0-10 scale) post treatment: 3/10    ASSESSMENT/Changes in Function: Patient seen this date for occupational therapy visit. Patient reports he is having numbness in tips of fingers on bilateral hands in 3rd and 4th digits. Patient with increased strength this date and decreased pain this date. Patient tolerates exercises and stretches without complaints of increased pain. Patient will continue to benefit from skilled OT services to modify and progress therapeutic interventions, address functional mobility deficits, address ROM deficits, address strength deficits, and analyze and cue movement patterns to attain remaining goals.      [x]  See Plan of Care  []  See progress note/recertification  []  See Discharge Summary         Progress towards goals / Updated goals: To be accomplished in 5 treatments:  1. Patient will increase R forearm supination from 28 degrees to 50 degrees to improve right UE function. -MET  2. Patient will increase L forearm supination from 48 degrees to 60 degrees to improve left UE function. -MET  3. Patient will increase R wrist flexion and extension by 10 degrees to improve right UE function. -PROGRESSING  4. Patient will increase L wrist flexion and extension by 10 degrees to improve left right UE function. -PROGRESSING  5. Patient will increase R  strength by 5# to improve R hand function. -MET  6. Patient will increase L  strength by 5# to improve L hand function. -MET      Long Term Goals: To be accomplished in 10 treatments:     Patient will demonstrate independence and compliance with HEP in order to assist with carryover from OT services. -MET  2. Patient will increase R forearm supination from 50 degrees to 85 degrees to improve right UE function. -PROGRESSING  3. Patient will increase L forearm supination from 60 degrees to 85 degrees to improve left UE function. -PROGRESSING  4. Patient will increase R wrist flexion and extension by 20 degrees to improve right UE function. -NOT MET  5. Patient will increase L wrist flexion and extension by 20 degrees to improve left right UE function. -NOT MET   6. Patient will increase R  strength by 15# to improve R hand function. -NOT MET   7. Patient will increase L  strength by 15# to improve L hand function.  -NOT MET        PLAN  [x]  Upgrade activities as tolerated     [x]  Continue plan of care  []  Update interventions per flow sheet       []  Discharge due to:_  []  Other:_      ANIRUDH Kwong/ISABEL, 10/19/2022

## 2022-10-25 ENCOUNTER — APPOINTMENT (OUTPATIENT)
Dept: PHYSICAL THERAPY | Age: 42
End: 2022-10-25
Payer: MEDICAID

## 2022-11-01 DIAGNOSIS — E11.9 TYPE 2 DIABETES MELLITUS WITHOUT COMPLICATION, WITH LONG-TERM CURRENT USE OF INSULIN (HCC): ICD-10-CM

## 2022-11-01 DIAGNOSIS — Z79.4 TYPE 2 DIABETES MELLITUS WITHOUT COMPLICATION, WITH LONG-TERM CURRENT USE OF INSULIN (HCC): ICD-10-CM

## 2022-11-01 RX ORDER — LANCETS 30 GAUGE
EACH MISCELLANEOUS
COMMUNITY
Start: 2022-08-05

## 2022-11-01 RX ORDER — INSULIN GLARGINE 100 [IU]/ML
40 INJECTION, SOLUTION SUBCUTANEOUS DAILY
Qty: 6 PEN | Refills: 3 | Status: SHIPPED | OUTPATIENT
Start: 2022-11-01 | End: 2022-11-11 | Stop reason: SDUPTHER

## 2022-11-11 ENCOUNTER — TELEPHONE (OUTPATIENT)
Dept: PRIMARY CARE CLINIC | Age: 42
End: 2022-11-11

## 2022-11-11 DIAGNOSIS — Z79.4 TYPE 2 DIABETES MELLITUS WITHOUT COMPLICATION, WITH LONG-TERM CURRENT USE OF INSULIN (HCC): ICD-10-CM

## 2022-11-11 DIAGNOSIS — E11.9 TYPE 2 DIABETES MELLITUS WITHOUT COMPLICATION, WITH LONG-TERM CURRENT USE OF INSULIN (HCC): ICD-10-CM

## 2022-11-11 RX ORDER — INSULIN GLARGINE 100 [IU]/ML
40 INJECTION, SOLUTION SUBCUTANEOUS DAILY
Qty: 6 PEN | Refills: 3 | Status: SHIPPED | OUTPATIENT
Start: 2022-11-11

## 2023-01-04 NOTE — THERAPY DISCHARGE
[address] [facility] [address]  Ph: [phone]    Fax: [phone]    Medicaid Discharge Summary  2-15    Patient name: Dania Riojas. : 1980  Provider#: [id number]  Referral source: Светлана Laurent MD      Medical/Treatment Diagnosis: Carpal tunnel syndrome, bilateral upper limbs [G56.03]     Prior Hospitalization: see medical history     Comorbidities: See Plan of Care  Prior Level of Function:See Plan of Care  Medications: Verified on Patient Summary List    Start of Care: 2022       Onset Date:2022   Visits from Start of Care: 7 Missed Visits: 3  Reporting Period : 10/19/2022 to 2023      ASSESSMENT / SUMMARY OF CARE:   Patient seen for occupational therapy services to address bilateral carpal tunnel syndrome. Patient progressing with increase strength and ROM, continued to have pain in bilateral hands and forearms. Patient not seen following 10/19/2022 visit with no answer to phone calls and no calls to reschedule visits or discuss discharge. Patient discharged from outpatient occupational therapy services. Progress Toward Goals:  Progress towards goals / Updated goals: To be accomplished in 5 treatments:  1. Patient will increase R forearm supination from 28 degrees to 50 degrees to improve right UE function. -MET  2. Patient will increase L forearm supination from 48 degrees to 60 degrees to improve left UE function. -MET  3. Patient will increase R wrist flexion and extension by 10 degrees to improve right UE function. -PROGRESSING  4. Patient will increase L wrist flexion and extension by 10 degrees to improve left right UE function. -PROGRESSING  5. Patient will increase R  strength by 5# to improve R hand function. -MET  6. Patient will increase L  strength by 5# to improve L hand function. -MET      Long Term Goals:  To be accomplished in 10 treatments:     Patient will demonstrate independence and compliance with HEP in order to assist with carryover from OT services. -MET  2. Patient will increase R forearm supination from 50 degrees to 85 degrees to improve right UE function. -PROGRESSING  3. Patient will increase L forearm supination from 60 degrees to 85 degrees to improve left UE function. -PROGRESSING  4. Patient will increase R wrist flexion and extension by 20 degrees to improve right UE function. -NOT MET  5. Patient will increase L wrist flexion and extension by 20 degrees to improve left right UE function. -NOT MET   6. Patient will increase R  strength by 15# to improve R hand function. -NOT MET   7. Patient will increase L  strength by 15# to improve L hand function. -NOT MET       RECOMMENDATIONS:  [x]Discontinue therapy: []Patient has reached or is progressing toward set goals      [x]Patient is non-compliant or has abdicated      []Due to lack of appreciable progress towards set goals      []Other    Darek Cornea, MSOTR/L, 1/4/2023       ______________________________________________________________________  NOTE TO PHYSICIAN:  Please complete the following and fax to:  [facility]:   Fax: [phone] this original for your records. If you are unable to process this request in 24 hours, please contact our office.      [de-identified] Signature:____________________  Date:____________Time:_________

## 2023-02-12 ENCOUNTER — HOSPITAL ENCOUNTER (INPATIENT)
Age: 43
LOS: 3 days | Discharge: HOME | DRG: 420 | End: 2023-02-15
Attending: EMERGENCY MEDICINE | Admitting: INTERNAL MEDICINE
Payer: MEDICAID

## 2023-02-12 ENCOUNTER — HOSPITAL ENCOUNTER (EMERGENCY)
Age: 43
Discharge: ANOTHER HOSPITAL | End: 2023-02-12
Attending: EMERGENCY MEDICINE
Payer: MEDICAID

## 2023-02-12 VITALS
HEART RATE: 90 BPM | DIASTOLIC BLOOD PRESSURE: 90 MMHG | WEIGHT: 158 LBS | RESPIRATION RATE: 20 BRPM | TEMPERATURE: 97.3 F | SYSTOLIC BLOOD PRESSURE: 121 MMHG | HEIGHT: 67 IN | OXYGEN SATURATION: 98 % | BODY MASS INDEX: 24.8 KG/M2

## 2023-02-12 DIAGNOSIS — E11.10 DIABETIC KETOACIDOSIS WITHOUT COMA ASSOCIATED WITH TYPE 2 DIABETES MELLITUS (HCC): Primary | ICD-10-CM

## 2023-02-12 LAB
ADMINISTERED INITIALS, ADMINIT: NORMAL
ALBUMIN SERPL-MCNC: 4.1 G/DL (ref 3.5–5)
ALBUMIN/GLOB SERPL: 0.9 (ref 1.1–2.2)
ALP SERPL-CCNC: 177 U/L (ref 45–117)
ALT SERPL-CCNC: 19 U/L (ref 12–78)
ANION GAP SERPL CALC-SCNC: 25 MMOL/L (ref 5–15)
APPEARANCE UR: CLEAR
AST SERPL W P-5'-P-CCNC: 9 U/L (ref 15–37)
BACTERIA URNS QL MICRO: NEGATIVE /HPF
BASE DEFICIT BLD-SCNC: 15.5 MMOL/L
BASOPHILS # BLD: 0 K/UL (ref 0–0.1)
BASOPHILS NFR BLD: 0 % (ref 0–1)
BILIRUB SERPL-MCNC: 0.5 MG/DL (ref 0.2–1)
BILIRUB UR QL: NEGATIVE
BUN SERPL-MCNC: 13 MG/DL (ref 6–20)
BUN/CREAT SERPL: 10 (ref 12–20)
CA-I BLD-MCNC: 1.31 MMOL/L (ref 1.12–1.32)
CA-I BLD-MCNC: 9.4 MG/DL (ref 8.5–10.1)
CHLORIDE BLD-SCNC: 111 MMOL/L (ref 98–107)
CHLORIDE SERPL-SCNC: 96 MMOL/L (ref 97–108)
CO2 BLD-SCNC: 12 MMOL/L
CO2 SERPL-SCNC: 8 MMOL/L (ref 21–32)
COLOR UR: ABNORMAL
CREAT SERPL-MCNC: 1.31 MG/DL (ref 0.7–1.3)
CREAT UR-MCNC: 0.69 MG/DL (ref 0.6–1.3)
D50 ADMINISTERED, D50ADM: 0 ML
D50 ORDER, D50ORD: 0 ML
DIFFERENTIAL METHOD BLD: ABNORMAL
EOSINOPHIL # BLD: 0.3 K/UL (ref 0–0.4)
EOSINOPHIL NFR BLD: 2 % (ref 0–7)
ERYTHROCYTE [DISTWIDTH] IN BLOOD BY AUTOMATED COUNT: 12.7 % (ref 11.5–14.5)
FIO2, L/MIN - FIO2P: ABNORMAL
GLOBULIN SER CALC-MCNC: 4.7 G/DL (ref 2–4)
GLUCOSE BLD STRIP.AUTO-MCNC: 158 MG/DL (ref 65–100)
GLUCOSE BLD STRIP.AUTO-MCNC: 167 MG/DL (ref 65–100)
GLUCOSE BLD STRIP.AUTO-MCNC: 211 MG/DL (ref 65–100)
GLUCOSE BLD STRIP.AUTO-MCNC: 241 MG/DL (ref 65–100)
GLUCOSE BLD STRIP.AUTO-MCNC: 308 MG/DL (ref 65–100)
GLUCOSE BLD STRIP.AUTO-MCNC: 325 MG/DL (ref 65–100)
GLUCOSE BLD STRIP.AUTO-MCNC: 529 MG/DL (ref 65–100)
GLUCOSE SERPL-MCNC: 486 MG/DL (ref 65–100)
GLUCOSE UR STRIP.AUTO-MCNC: 500 MG/DL
GLUCOSE, GLC: 158 MG/DL
GLUCOSE, GLC: 211 MG/DL
GLUCOSE, GLC: 308 MG/DL
HCO3 BLD-SCNC: 12.3 MMOL/L (ref 19–28)
HCT VFR BLD AUTO: 45.9 % (ref 36.6–50.3)
HGB BLD-MCNC: 16 G/DL (ref 12.1–17)
HGB UR QL STRIP: ABNORMAL
HIGH TARGET, HITG: 200 MG/DL
IMM GRANULOCYTES # BLD AUTO: 0.1 K/UL (ref 0–0.04)
IMM GRANULOCYTES NFR BLD AUTO: 1 % (ref 0–0.5)
INSULIN ADMINSTERED, INSADM: 2 UNITS/HOUR
INSULIN ADMINSTERED, INSADM: 4.5 UNITS/HOUR
INSULIN ADMINSTERED, INSADM: 9.9 UNITS/HOUR
INSULIN ORDER, INSORD: 2 UNITS/HOUR
INSULIN ORDER, INSORD: 4.5 UNITS/HOUR
INSULIN ORDER, INSORD: 9.9 UNITS/HOUR
KETONES UR QL STRIP.AUTO: >80 MG/DL
LACTATE BLD-SCNC: 0.75 MMOL/L (ref 0.4–2)
LEUKOCYTE ESTERASE UR QL STRIP.AUTO: NEGATIVE
LOW TARGET, LOT: 150 MG/DL
LYMPHOCYTES # BLD: 2.1 K/UL (ref 0.8–3.5)
LYMPHOCYTES NFR BLD: 18 % (ref 12–49)
MCH RBC QN AUTO: 29.9 PG (ref 26–34)
MCHC RBC AUTO-ENTMCNC: 34.9 G/DL (ref 30–36.5)
MCV RBC AUTO: 85.6 FL (ref 80–99)
MINUTES UNTIL NEXT BG, NBG: 60 MIN
MONOCYTES # BLD: 0.4 K/UL (ref 0–1)
MONOCYTES NFR BLD: 3 % (ref 5–13)
MULTIPLIER, MUL: 0.02
MULTIPLIER, MUL: 0.03
MULTIPLIER, MUL: 0.04
NEUTS SEG # BLD: 8.6 K/UL (ref 1.8–8)
NEUTS SEG NFR BLD: 76 % (ref 32–75)
NITRITE UR QL STRIP.AUTO: NEGATIVE
NRBC # BLD: 0 K/UL (ref 0–0.01)
NRBC BLD-RTO: 0 PER 100 WBC
ORDER INITIALS, ORDINIT: NORMAL
PCO2 BLD: 34.8 MMHG (ref 35–45)
PERFORMED BY, TECHID: ABNORMAL
PH BLD: 7.16 (ref 7.35–7.45)
PH UR STRIP: 6 (ref 5–8)
PLATELET # BLD AUTO: 245 K/UL (ref 150–400)
PMV BLD AUTO: 10.8 FL (ref 8.9–12.9)
PO2 BLD: 27 MMHG (ref 75–100)
POTASSIUM BLD-SCNC: 4 MMOL/L (ref 3.5–5.5)
POTASSIUM SERPL-SCNC: 4.6 MMOL/L (ref 3.5–5.1)
PROT SERPL-MCNC: 8.8 G/DL (ref 6.4–8.2)
PROT UR STRIP-MCNC: 100 MG/DL
RBC # BLD AUTO: 5.36 M/UL (ref 4.1–5.7)
RBC #/AREA URNS HPF: NORMAL /HPF (ref 0–3)
RESPIRATORY RATE: ABNORMAL (ref 5–40)
SAO2 % BLD: 37 %
SERVICE CMNT-IMP: ABNORMAL
SODIUM BLD-SCNC: 135 MMOL/L (ref 136–145)
SODIUM SERPL-SCNC: 129 MMOL/L (ref 136–145)
SP GR UR REFRACTOMETRY: >1.03 (ref 1–1.03)
SPECIMEN SITE: ABNORMAL
TROPONIN I SERPL HS-MCNC: 16 NG/L (ref 0–76)
UROBILINOGEN UR QL STRIP.AUTO: 0.2 EU/DL (ref 0.2–1)
WBC # BLD AUTO: 11.5 K/UL (ref 4.1–11.1)
WBC URNS QL MICRO: NORMAL /HPF (ref 0–5)

## 2023-02-12 PROCEDURE — 74011000258 HC RX REV CODE- 258: Performed by: INTERNAL MEDICINE

## 2023-02-12 PROCEDURE — 99285 EMERGENCY DEPT VISIT HI MDM: CPT

## 2023-02-12 PROCEDURE — 82962 GLUCOSE BLOOD TEST: CPT

## 2023-02-12 PROCEDURE — 80053 COMPREHEN METABOLIC PANEL: CPT

## 2023-02-12 PROCEDURE — 93005 ELECTROCARDIOGRAM TRACING: CPT

## 2023-02-12 PROCEDURE — 85025 COMPLETE CBC W/AUTO DIFF WBC: CPT

## 2023-02-12 PROCEDURE — 74011000258 HC RX REV CODE- 258: Performed by: EMERGENCY MEDICINE

## 2023-02-12 PROCEDURE — 82947 ASSAY GLUCOSE BLOOD QUANT: CPT

## 2023-02-12 PROCEDURE — 65610000006 HC RM INTENSIVE CARE

## 2023-02-12 PROCEDURE — 80048 BASIC METABOLIC PNL TOTAL CA: CPT

## 2023-02-12 PROCEDURE — 36415 COLL VENOUS BLD VENIPUNCTURE: CPT

## 2023-02-12 PROCEDURE — 96361 HYDRATE IV INFUSION ADD-ON: CPT

## 2023-02-12 PROCEDURE — 96376 TX/PRO/DX INJ SAME DRUG ADON: CPT

## 2023-02-12 PROCEDURE — 74011250636 HC RX REV CODE- 250/636: Performed by: EMERGENCY MEDICINE

## 2023-02-12 PROCEDURE — 74011636637 HC RX REV CODE- 636/637: Performed by: EMERGENCY MEDICINE

## 2023-02-12 PROCEDURE — 84484 ASSAY OF TROPONIN QUANT: CPT

## 2023-02-12 PROCEDURE — 81001 URINALYSIS AUTO W/SCOPE: CPT

## 2023-02-12 PROCEDURE — 74011636637 HC RX REV CODE- 636/637: Performed by: INTERNAL MEDICINE

## 2023-02-12 PROCEDURE — 96374 THER/PROPH/DIAG INJ IV PUSH: CPT

## 2023-02-12 RX ORDER — INSULIN GLARGINE 100 [IU]/ML
30 INJECTION, SOLUTION SUBCUTANEOUS DAILY
Status: DISCONTINUED | OUTPATIENT
Start: 2023-02-13 | End: 2023-02-12

## 2023-02-12 RX ORDER — ACETAMINOPHEN 650 MG/1
650 SUPPOSITORY RECTAL
Status: DISCONTINUED | OUTPATIENT
Start: 2023-02-12 | End: 2023-02-15 | Stop reason: HOSPADM

## 2023-02-12 RX ORDER — DEXTROSE MONOHYDRATE 100 MG/ML
0-250 INJECTION, SOLUTION INTRAVENOUS AS NEEDED
Status: DISCONTINUED | OUTPATIENT
Start: 2023-02-12 | End: 2023-02-14

## 2023-02-12 RX ORDER — ONDANSETRON 2 MG/ML
4 INJECTION INTRAMUSCULAR; INTRAVENOUS
Status: DISCONTINUED | OUTPATIENT
Start: 2023-02-12 | End: 2023-02-15 | Stop reason: HOSPADM

## 2023-02-12 RX ORDER — DULOXETIN HYDROCHLORIDE 30 MG/1
30 CAPSULE, DELAYED RELEASE ORAL DAILY
Status: DISCONTINUED | OUTPATIENT
Start: 2023-02-13 | End: 2023-02-15 | Stop reason: HOSPADM

## 2023-02-12 RX ORDER — ENOXAPARIN SODIUM 100 MG/ML
40 INJECTION SUBCUTANEOUS DAILY
Status: DISCONTINUED | OUTPATIENT
Start: 2023-02-13 | End: 2023-02-15 | Stop reason: HOSPADM

## 2023-02-12 RX ORDER — IBUPROFEN 200 MG
4 TABLET ORAL AS NEEDED
Status: DISCONTINUED | OUTPATIENT
Start: 2023-02-12 | End: 2023-02-14

## 2023-02-12 RX ORDER — DEXTROSE MONOHYDRATE AND SODIUM CHLORIDE 5; .9 G/100ML; G/100ML
150 INJECTION, SOLUTION INTRAVENOUS CONTINUOUS
Status: DISCONTINUED | OUTPATIENT
Start: 2023-02-12 | End: 2023-02-14

## 2023-02-12 RX ORDER — SODIUM CHLORIDE 0.9 % (FLUSH) 0.9 %
5-40 SYRINGE (ML) INJECTION EVERY 8 HOURS
Status: DISCONTINUED | OUTPATIENT
Start: 2023-02-12 | End: 2023-02-15 | Stop reason: HOSPADM

## 2023-02-12 RX ORDER — POLYETHYLENE GLYCOL 3350 17 G/17G
17 POWDER, FOR SOLUTION ORAL DAILY PRN
Status: DISCONTINUED | OUTPATIENT
Start: 2023-02-12 | End: 2023-02-15 | Stop reason: HOSPADM

## 2023-02-12 RX ORDER — ACETAMINOPHEN 325 MG/1
650 TABLET ORAL
Status: DISCONTINUED | OUTPATIENT
Start: 2023-02-12 | End: 2023-02-15 | Stop reason: HOSPADM

## 2023-02-12 RX ORDER — ONDANSETRON 4 MG/1
4 TABLET, ORALLY DISINTEGRATING ORAL
Status: DISCONTINUED | OUTPATIENT
Start: 2023-02-12 | End: 2023-02-15 | Stop reason: HOSPADM

## 2023-02-12 RX ORDER — SODIUM CHLORIDE 0.9 % (FLUSH) 0.9 %
5-40 SYRINGE (ML) INJECTION AS NEEDED
Status: DISCONTINUED | OUTPATIENT
Start: 2023-02-12 | End: 2023-02-15 | Stop reason: HOSPADM

## 2023-02-12 RX ORDER — INSULIN LISPRO 100 [IU]/ML
INJECTION, SOLUTION INTRAVENOUS; SUBCUTANEOUS
Status: DISCONTINUED | OUTPATIENT
Start: 2023-02-13 | End: 2023-02-12

## 2023-02-12 RX ORDER — DULOXETIN HYDROCHLORIDE 30 MG/1
30 CAPSULE, DELAYED RELEASE ORAL DAILY
COMMUNITY

## 2023-02-12 RX ADMIN — INSULIN HUMAN 8 UNITS: 100 INJECTION, SOLUTION PARENTERAL at 17:04

## 2023-02-12 RX ADMIN — DEXTROSE AND SODIUM CHLORIDE 150 ML/HR: 5; 900 INJECTION, SOLUTION INTRAVENOUS at 21:25

## 2023-02-12 RX ADMIN — SODIUM CHLORIDE 1000 ML: 9 INJECTION, SOLUTION INTRAVENOUS at 17:03

## 2023-02-12 RX ADMIN — SODIUM CHLORIDE 2 UNITS/HR: 9 INJECTION, SOLUTION INTRAVENOUS at 21:25

## 2023-02-12 RX ADMIN — SODIUM CHLORIDE 5 UNITS/HR: 9 INJECTION, SOLUTION INTRAVENOUS at 17:44

## 2023-02-12 RX ADMIN — SODIUM CHLORIDE 1000 ML: 9 INJECTION, SOLUTION INTRAVENOUS at 17:44

## 2023-02-12 NOTE — Clinical Note
Status[de-identified] INPATIENT [101]   Type of Bed: Intensive Care [6]   Cardiac Monitoring Required?: Yes   Inpatient Hospitalization Certified Necessary for the Following Reasons: 3.  Patient receiving treatment that can only be provided in an inpatient setting (further clarification in H&P documentation)   Admitting Diagnosis: DKA (diabetic ketoacidosis) [facility]) [[id number]]   Admitting Physician: Baldo KINGSTON [facility] [[id number]]   Attending Physician: Anne Lucio [[id number]]   Estimated Length of Stay: 3-4 Midnights   Discharge Plan[de-identified] Home with Office Follow-up

## 2023-02-12 NOTE — ED TRIAGE NOTES
Pt reports since taking duloxetine and pregabalin on 2/8/2023, he's been fatigued, dizzy, having intermittent chest pain, and having numbness to the fingers.

## 2023-02-12 NOTE — ED PROVIDER NOTES
HPI 51-year-old male with type 2 diabetes presents the ED complaining of fatigue dizziness lightheadedness. He is just glucoses been running high now for over a week he was begun on Cymbalta and Lyrica for diabetic neuropathy 4 days ago. However most the symptoms preceded the onset of the medication. Currently manages glucose with 25 units of Lantus insulin in the morning single dose. He denies fever cough or GI symptoms recent illness he has noted increased thirst and urine output. No other family members are ill.     Past Medical History:   Diagnosis Date    Chest discomfort     Diabetes mellitus ([facility] [address] 12/16/2019    Type II    Diabetic neuropathy (HCC)     feet    Hyperlipidemia associated with type 2 diabetes mellitus ([facility] [address]        Past Surgical History:   Procedure Laterality Date    HX APPENDECTOMY           Family History:   Problem Relation Age of Onset    No Known Problems Mother     No Known Problems Father     Diabetes Maternal Grandmother     Diabetes Paternal Grandmother     Hypertension Paternal Grandmother        Social History     Socioeconomic History    Marital status: SINGLE     Spouse name: Not on file    Number of children: Not on file    Years of education: Not on file    Highest education level: Not on file   Occupational History    Not on file   Tobacco Use    Smoking status: Never    Smokeless tobacco: Never   Vaping Use    Vaping Use: Never used   Substance and Sexual Activity    Alcohol use: Not Currently    Drug use: Yes     Types: Marijuana     Comment: every other day    Sexual activity: Not Currently   Other Topics Concern    Not on file   Social History Narrative    Not on file     Social Determinants of Health     Financial Resource Strain: Not on file   Food Insecurity: Not on file   Transportation Needs: Not on file   Physical Activity: Not on file   Stress: Not on file   Social Connections: Not on file   Intimate Partner Violence: Not on file   Housing Stability: Not on file ALLERGIES: [place]    Review of Systems   Constitutional:  Negative for appetite change, chills, diaphoresis and fever. HENT:  Negative for ear pain, facial swelling, sinus pain and trouble swallowing. Eyes:  Negative for redness and visual disturbance. Respiratory:  Negative for cough, choking, chest tightness, shortness of breath, wheezing and stridor. Cardiovascular:  Negative for chest pain, palpitations and leg swelling. Gastrointestinal:  Negative for abdominal distention, abdominal pain, blood in stool, diarrhea, nausea and vomiting. Endocrine: Negative for polydipsia and polyuria. Genitourinary:  Positive for frequency. Negative for difficulty urinating, dysuria, flank pain, hematuria and urgency. Musculoskeletal: Negative. Allergic/Immunologic: Negative. Neurological: Negative. Psychiatric/Behavioral: Negative. Vitals:    02/12/23 1627   BP: 123/87   Pulse: (!) 117   Resp: 20   Temp: 97.3 °F (36.3 °C)   SpO2: 99%   Weight: 71.7 kg (158 lb)   Height: 5' 7\" (1.702 m)          Middle-age male appears dehydrated fatigue no acute distress afebrile saturation 99%  Physical Exam  Vitals and nursing note reviewed. Constitutional:       General: He is not in acute distress. Appearance: He is not ill-appearing, toxic-appearing or diaphoretic. HENT:      Head: Normocephalic and atraumatic. Right Ear: Tympanic membrane normal.      Left Ear: Tympanic membrane normal.      Nose: Nose normal.      Mouth/Throat:      Mouth: Mucous membranes are moist.   Eyes:      Extraocular Movements: Extraocular movements intact. Conjunctiva/sclera: Conjunctivae normal.      Pupils: Pupils are equal, round, and reactive to light. Cardiovascular:      Rate and Rhythm: Normal rate and regular rhythm. Pulses: Normal pulses. Heart sounds: Normal heart sounds. No murmur heard. Pulmonary:      Effort: Pulmonary effort is normal. No respiratory distress.       Breath sounds: Normal breath sounds. No wheezing, rhonchi or rales. Abdominal:      General: Bowel sounds are normal. There is no distension. Palpations: Abdomen is soft. There is no mass. Tenderness: There is no abdominal tenderness. There is no right CVA tenderness, left CVA tenderness, guarding or rebound. Hernia: No hernia is present. Musculoskeletal:         General: No swelling, tenderness, deformity or signs of injury. Normal range of motion. Cervical back: Normal range of motion and neck supple. No rigidity or tenderness. Right lower leg: No edema. Left lower leg: No edema. Lymphadenopathy:      Cervical: No cervical adenopathy. Skin:     General: Skin is warm and dry. Capillary Refill: Capillary refill takes less than 2 seconds. Findings: No erythema, lesion or rash. Neurological:      General: No focal deficit present. Mental Status: He is alert and oriented to person, place, and time. Mental status is at baseline. Cranial Nerves: No cranial nerve deficit. Sensory: No sensory deficit. Psychiatric:         Mood and Affect: Mood normal.         Behavior: Behavior normal.         Thought Content: Thought content normal.        Medical Decision Making  Amount and/or Complexity of Data Reviewed  Labs: ordered. ECG/medicine tests: ordered. Risk  OTC drugs. POC glucose on arrival 529  Labs reveal diabetic ketoacidosis with a serum bicarbonate of 8 and an anion gap of 25 feeling some better after initial dose of insulin and liter of IV fluids insulin drip begun 5 units an hour.   Findings for underlying infection urinalysis concentrated ketone positive no evidence of infection    Procedures    EKG performed on arrival by protocol at 1627 interpreted at 1633 shows a sinus tachycardia at 106 borderline left axis normal PA interval normal QRS duration there is changes of early repolarization's in lead V2 and V3 no acute injury      Critical care time 40 minutes patient arrived severely dehydrated with markedly elevated glucose labs revealing DKA requiring insulin drip infusion and transferred to [facility] telephone communication with ED attending Dr. Christina Aj who accepts transfer.   Critical care time is independent of all other procedural time

## 2023-02-13 LAB
ADMINISTERED INITIALS, ADMINIT: NORMAL
ANION GAP SERPL CALC-SCNC: 10 MMOL/L (ref 5–15)
ANION GAP SERPL CALC-SCNC: 3 MMOL/L (ref 5–15)
ANION GAP SERPL CALC-SCNC: 5 MMOL/L (ref 5–15)
ANION GAP SERPL CALC-SCNC: 6 MMOL/L (ref 5–15)
ANION GAP SERPL CALC-SCNC: 6 MMOL/L (ref 5–15)
ANION GAP SERPL CALC-SCNC: 8 MMOL/L (ref 5–15)
ARTERIAL PATENCY WRIST A: YES
ATRIAL RATE: 100 BPM
ATRIAL RATE: 106 BPM
BASE DEFICIT BLDA-SCNC: 8.6 MMOL/L
BDY SITE: ABNORMAL
BODY TEMPERATURE: 98.6
BUN SERPL-MCNC: 10 MG/DL (ref 6–20)
BUN SERPL-MCNC: 11 MG/DL (ref 6–20)
BUN SERPL-MCNC: 12 MG/DL (ref 6–20)
BUN SERPL-MCNC: 5 MG/DL (ref 6–20)
BUN SERPL-MCNC: 6 MG/DL (ref 6–20)
BUN SERPL-MCNC: 8 MG/DL (ref 6–20)
BUN/CREAT SERPL: 10 (ref 12–20)
BUN/CREAT SERPL: 12 (ref 12–20)
BUN/CREAT SERPL: 12 (ref 12–20)
BUN/CREAT SERPL: 13 (ref 12–20)
BUN/CREAT SERPL: 16 (ref 12–20)
BUN/CREAT SERPL: 9 (ref 12–20)
CA-I BLD-MCNC: 8.2 MG/DL (ref 8.5–10.1)
CA-I BLD-MCNC: 8.3 MG/DL (ref 8.5–10.1)
CA-I BLD-MCNC: 8.4 MG/DL (ref 8.5–10.1)
CA-I BLD-MCNC: 8.5 MG/DL (ref 8.5–10.1)
CALCULATED P AXIS, ECG09: 15 DEGREES
CALCULATED P AXIS, ECG09: 57 DEGREES
CALCULATED R AXIS, ECG10: -16 DEGREES
CALCULATED R AXIS, ECG10: -17 DEGREES
CALCULATED T AXIS, ECG11: 13 DEGREES
CALCULATED T AXIS, ECG11: 43 DEGREES
CHLORIDE SERPL-SCNC: 109 MMOL/L (ref 97–108)
CHLORIDE SERPL-SCNC: 111 MMOL/L (ref 97–108)
CHLORIDE SERPL-SCNC: 111 MMOL/L (ref 97–108)
CHLORIDE SERPL-SCNC: 112 MMOL/L (ref 97–108)
CHLORIDE SERPL-SCNC: 113 MMOL/L (ref 97–108)
CHLORIDE SERPL-SCNC: 114 MMOL/L (ref 97–108)
CO2 SERPL-SCNC: 15 MMOL/L (ref 21–32)
CO2 SERPL-SCNC: 15 MMOL/L (ref 21–32)
CO2 SERPL-SCNC: 18 MMOL/L (ref 21–32)
CO2 SERPL-SCNC: 21 MMOL/L (ref 21–32)
CO2 SERPL-SCNC: 23 MMOL/L (ref 21–32)
CO2 SERPL-SCNC: 24 MMOL/L (ref 21–32)
COHGB MFR BLD: 0.7 % (ref 1–2)
CREAT SERPL-MCNC: 0.5 MG/DL (ref 0.7–1.3)
CREAT SERPL-MCNC: 0.58 MG/DL (ref 0.7–1.3)
CREAT SERPL-MCNC: 0.68 MG/DL (ref 0.7–1.3)
CREAT SERPL-MCNC: 0.73 MG/DL (ref 0.7–1.3)
CREAT SERPL-MCNC: 0.82 MG/DL (ref 0.7–1.3)
CREAT SERPL-MCNC: 1.03 MG/DL (ref 0.7–1.3)
D50 ADMINISTERED, D50ADM: 0 ML
D50 ADMINISTERED, D50ADM: 20 ML
D50 ORDER, D50ORD: 0 ML
D50 ORDER, D50ORD: 20 ML
DIAGNOSIS, 93000: NORMAL
DIAGNOSIS, 93000: NORMAL
ERYTHROCYTE [DISTWIDTH] IN BLOOD BY AUTOMATED COUNT: 12.6 % (ref 11.5–14.5)
EST. AVERAGE GLUCOSE BLD GHB EST-MCNC: 252 MG/DL
FIO2 ON VENT: 21 %
GLUCOSE BLD STRIP.AUTO-MCNC: 124 MG/DL (ref 65–100)
GLUCOSE BLD STRIP.AUTO-MCNC: 129 MG/DL (ref 65–100)
GLUCOSE BLD STRIP.AUTO-MCNC: 137 MG/DL (ref 65–100)
GLUCOSE BLD STRIP.AUTO-MCNC: 142 MG/DL (ref 65–100)
GLUCOSE BLD STRIP.AUTO-MCNC: 144 MG/DL (ref 65–100)
GLUCOSE BLD STRIP.AUTO-MCNC: 156 MG/DL (ref 65–100)
GLUCOSE BLD STRIP.AUTO-MCNC: 156 MG/DL (ref 65–100)
GLUCOSE BLD STRIP.AUTO-MCNC: 161 MG/DL (ref 65–100)
GLUCOSE BLD STRIP.AUTO-MCNC: 163 MG/DL (ref 65–100)
GLUCOSE BLD STRIP.AUTO-MCNC: 179 MG/DL (ref 65–100)
GLUCOSE BLD STRIP.AUTO-MCNC: 181 MG/DL (ref 65–100)
GLUCOSE BLD STRIP.AUTO-MCNC: 187 MG/DL (ref 65–100)
GLUCOSE BLD STRIP.AUTO-MCNC: 188 MG/DL (ref 65–100)
GLUCOSE BLD STRIP.AUTO-MCNC: 189 MG/DL (ref 65–100)
GLUCOSE BLD STRIP.AUTO-MCNC: 198 MG/DL (ref 65–100)
GLUCOSE BLD STRIP.AUTO-MCNC: 202 MG/DL (ref 65–100)
GLUCOSE BLD STRIP.AUTO-MCNC: 215 MG/DL (ref 65–100)
GLUCOSE BLD STRIP.AUTO-MCNC: 237 MG/DL (ref 65–100)
GLUCOSE BLD STRIP.AUTO-MCNC: 289 MG/DL (ref 65–100)
GLUCOSE BLD STRIP.AUTO-MCNC: 305 MG/DL (ref 65–100)
GLUCOSE BLD STRIP.AUTO-MCNC: 51 MG/DL (ref 65–100)
GLUCOSE SERPL-MCNC: 121 MG/DL (ref 65–100)
GLUCOSE SERPL-MCNC: 174 MG/DL (ref 65–100)
GLUCOSE SERPL-MCNC: 184 MG/DL (ref 65–100)
GLUCOSE SERPL-MCNC: 201 MG/DL (ref 65–100)
GLUCOSE SERPL-MCNC: 202 MG/DL (ref 65–100)
GLUCOSE SERPL-MCNC: 299 MG/DL (ref 65–100)
GLUCOSE, GLC: 124 MG/DL
GLUCOSE, GLC: 129 MG/DL
GLUCOSE, GLC: 137 MG/DL
GLUCOSE, GLC: 142 MG/DL
GLUCOSE, GLC: 144 MG/DL
GLUCOSE, GLC: 156 MG/DL
GLUCOSE, GLC: 156 MG/DL
GLUCOSE, GLC: 161 MG/DL
GLUCOSE, GLC: 163 MG/DL
GLUCOSE, GLC: 179 MG/DL
GLUCOSE, GLC: 181 MG/DL
GLUCOSE, GLC: 187 MG/DL
GLUCOSE, GLC: 189 MG/DL
GLUCOSE, GLC: 198 MG/DL
GLUCOSE, GLC: 202 MG/DL
GLUCOSE, GLC: 215 MG/DL
GLUCOSE, GLC: 237 MG/DL
GLUCOSE, GLC: 289 MG/DL
GLUCOSE, GLC: 305 MG/DL
GLUCOSE, GLC: 51 MG/DL
HBA1C MFR BLD: 10.4 % (ref 4–5.6)
HCO3 BLDA-SCNC: 17 MMOL/L (ref 22–26)
HCT VFR BLD AUTO: 35 % (ref 36.6–50.3)
HGB BLD-MCNC: 12.4 G/DL (ref 12.1–17)
HIGH TARGET, HITG: 200 MG/DL
INSULIN ADMINSTERED, INSADM: 0 UNITS/HOUR
INSULIN ADMINSTERED, INSADM: 10.3 UNITS/HOUR
INSULIN ADMINSTERED, INSADM: 11 UNITS/HOUR
INSULIN ADMINSTERED, INSADM: 12.3 UNITS/HOUR
INSULIN ADMINSTERED, INSADM: 2.7 UNITS/HOUR
INSULIN ADMINSTERED, INSADM: 2.8 UNITS/HOUR
INSULIN ADMINSTERED, INSADM: 2.9 UNITS/HOUR
INSULIN ADMINSTERED, INSADM: 3.3 UNITS/HOUR
INSULIN ADMINSTERED, INSADM: 3.6 UNITS/HOUR
INSULIN ADMINSTERED, INSADM: 3.8 UNITS/HOUR
INSULIN ADMINSTERED, INSADM: 3.9 UNITS/HOUR
INSULIN ADMINSTERED, INSADM: 4.3 UNITS/HOUR
INSULIN ADMINSTERED, INSADM: 4.5 UNITS/HOUR
INSULIN ADMINSTERED, INSADM: 6.5 UNITS/HOUR
INSULIN ADMINSTERED, INSADM: 7 UNITS/HOUR
INSULIN ADMINSTERED, INSADM: 7.1 UNITS/HOUR
INSULIN ADMINSTERED, INSADM: 8.3 UNITS/HOUR
INSULIN ADMINSTERED, INSADM: 8.6 UNITS/HOUR
INSULIN ADMINSTERED, INSADM: 9.3 UNITS/HOUR
INSULIN ADMINSTERED, INSADM: 9.7 UNITS/HOUR
INSULIN ORDER, INSORD: 0 UNITS/HOUR
INSULIN ORDER, INSORD: 10.3 UNITS/HOUR
INSULIN ORDER, INSORD: 11 UNITS/HOUR
INSULIN ORDER, INSORD: 12.3 UNITS/HOUR
INSULIN ORDER, INSORD: 2.7 UNITS/HOUR
INSULIN ORDER, INSORD: 2.8 UNITS/HOUR
INSULIN ORDER, INSORD: 2.9 UNITS/HOUR
INSULIN ORDER, INSORD: 3.3 UNITS/HOUR
INSULIN ORDER, INSORD: 3.6 UNITS/HOUR
INSULIN ORDER, INSORD: 3.8 UNITS/HOUR
INSULIN ORDER, INSORD: 3.9 UNITS/HOUR
INSULIN ORDER, INSORD: 4.3 UNITS/HOUR
INSULIN ORDER, INSORD: 4.5 UNITS/HOUR
INSULIN ORDER, INSORD: 6.5 UNITS/HOUR
INSULIN ORDER, INSORD: 7 UNITS/HOUR
INSULIN ORDER, INSORD: 7.1 UNITS/HOUR
INSULIN ORDER, INSORD: 8.3 UNITS/HOUR
INSULIN ORDER, INSORD: 8.6 UNITS/HOUR
INSULIN ORDER, INSORD: 9.3 UNITS/HOUR
INSULIN ORDER, INSORD: 9.7 UNITS/HOUR
LOW TARGET, LOT: 150 MG/DL
MCH RBC QN AUTO: 29.3 PG (ref 26–34)
MCHC RBC AUTO-ENTMCNC: 35.4 G/DL (ref 30–36.5)
MCV RBC AUTO: 82.7 FL (ref 80–99)
METHGB MFR BLD: 0.3 % (ref 0–1.4)
MINUTES UNTIL NEXT BG, NBG: 15 MIN
MINUTES UNTIL NEXT BG, NBG: 60 MIN
MULTIPLIER, MUL: 0.02
MULTIPLIER, MUL: 0.03
MULTIPLIER, MUL: 0.03
MULTIPLIER, MUL: 0.04
MULTIPLIER, MUL: 0.05
MULTIPLIER, MUL: 0.06
MULTIPLIER, MUL: 0.07
NRBC # BLD: 0 K/UL (ref 0–0.01)
NRBC BLD-RTO: 0 PER 100 WBC
ORDER INITIALS, ORDINIT: NORMAL
OXYHGB MFR BLD: 97.3 % (ref 95–99)
P-R INTERVAL, ECG05: 134 MS
P-R INTERVAL, ECG05: 140 MS
PCO2 BLDA: 37 MMHG (ref 35–45)
PERFORMED BY, TECHID: ABNORMAL
PH BLDA: 7.29 (ref 7.35–7.45)
PLATELET # BLD AUTO: 188 K/UL (ref 150–400)
PMV BLD AUTO: 11 FL (ref 8.9–12.9)
PO2 BLDA: 111 MMHG (ref 80–100)
POTASSIUM SERPL-SCNC: 3 MMOL/L (ref 3.5–5.1)
POTASSIUM SERPL-SCNC: 3.1 MMOL/L (ref 3.5–5.1)
POTASSIUM SERPL-SCNC: 3.2 MMOL/L (ref 3.5–5.1)
POTASSIUM SERPL-SCNC: 3.3 MMOL/L (ref 3.5–5.1)
Q-T INTERVAL, ECG07: 368 MS
Q-T INTERVAL, ECG07: 394 MS
QRS DURATION, ECG06: 84 MS
QRS DURATION, ECG06: 93 MS
QTC CALCULATION (BEZET), ECG08: 489 MS
QTC CALCULATION (BEZET), ECG08: 508 MS
RBC # BLD AUTO: 4.23 M/UL (ref 4.1–5.7)
SAO2 % BLD: 98 % (ref 95–99)
SAO2% DEVICE SAO2% SENSOR NAME: ABNORMAL
SODIUM SERPL-SCNC: 134 MMOL/L (ref 136–145)
SODIUM SERPL-SCNC: 136 MMOL/L (ref 136–145)
SODIUM SERPL-SCNC: 138 MMOL/L (ref 136–145)
SODIUM SERPL-SCNC: 140 MMOL/L (ref 136–145)
SPECIMEN SITE: ABNORMAL
TROPONIN I SERPL HS-MCNC: 16 NG/L (ref 0–76)
VENTRICULAR RATE, ECG03: 100 BPM
VENTRICULAR RATE, ECG03: 106 BPM
WBC # BLD AUTO: 7.7 K/UL (ref 4.1–11.1)

## 2023-02-13 PROCEDURE — 84484 ASSAY OF TROPONIN QUANT: CPT

## 2023-02-13 PROCEDURE — 36600 WITHDRAWAL OF ARTERIAL BLOOD: CPT

## 2023-02-13 PROCEDURE — 80048 BASIC METABOLIC PNL TOTAL CA: CPT

## 2023-02-13 PROCEDURE — 74011250637 HC RX REV CODE- 250/637: Performed by: NURSE PRACTITIONER

## 2023-02-13 PROCEDURE — 82962 GLUCOSE BLOOD TEST: CPT

## 2023-02-13 PROCEDURE — 65270000029 HC RM PRIVATE

## 2023-02-13 PROCEDURE — 82803 BLOOD GASES ANY COMBINATION: CPT

## 2023-02-13 PROCEDURE — 74011000250 HC RX REV CODE- 250: Performed by: INTERNAL MEDICINE

## 2023-02-13 PROCEDURE — 74011000258 HC RX REV CODE- 258: Performed by: INTERNAL MEDICINE

## 2023-02-13 PROCEDURE — 74011250637 HC RX REV CODE- 250/637: Performed by: INTERNAL MEDICINE

## 2023-02-13 PROCEDURE — 36415 COLL VENOUS BLD VENIPUNCTURE: CPT

## 2023-02-13 PROCEDURE — 65610000006 HC RM INTENSIVE CARE

## 2023-02-13 PROCEDURE — 85027 COMPLETE CBC AUTOMATED: CPT

## 2023-02-13 PROCEDURE — 74011636637 HC RX REV CODE- 636/637: Performed by: INTERNAL MEDICINE

## 2023-02-13 PROCEDURE — 83036 HEMOGLOBIN GLYCOSYLATED A1C: CPT

## 2023-02-13 PROCEDURE — 74011250636 HC RX REV CODE- 250/636: Performed by: INTERNAL MEDICINE

## 2023-02-13 RX ORDER — MAG HYDROX/ALUMINUM HYD/SIMETH 200-200-20
30 SUSPENSION, ORAL (FINAL DOSE FORM) ORAL ONCE
Status: COMPLETED | OUTPATIENT
Start: 2023-02-13 | End: 2023-02-13

## 2023-02-13 RX ORDER — POTASSIUM CHLORIDE 20 MEQ/1
40 TABLET, EXTENDED RELEASE ORAL
Status: COMPLETED | OUTPATIENT
Start: 2023-02-13 | End: 2023-02-13

## 2023-02-13 RX ORDER — POTASSIUM CHLORIDE, DEXTROSE MONOHYDRATE AND SODIUM CHLORIDE 300; 5; 900 MG/100ML; G/100ML; MG/100ML
INJECTION, SOLUTION INTRAVENOUS CONTINUOUS
Status: DISCONTINUED | OUTPATIENT
Start: 2023-02-13 | End: 2023-02-14

## 2023-02-13 RX ADMIN — SODIUM CHLORIDE 3.9 UNITS/HR: 9 INJECTION, SOLUTION INTRAVENOUS at 09:19

## 2023-02-13 RX ADMIN — SODIUM CHLORIDE, PRESERVATIVE FREE 10 ML: 5 INJECTION INTRAVENOUS at 15:35

## 2023-02-13 RX ADMIN — SODIUM CHLORIDE 9.3 UNITS/HR: 9 INJECTION, SOLUTION INTRAVENOUS at 01:45

## 2023-02-13 RX ADMIN — SODIUM CHLORIDE, PRESERVATIVE FREE 10 ML: 5 INJECTION INTRAVENOUS at 23:09

## 2023-02-13 RX ADMIN — SODIUM CHLORIDE 4.3 UNITS/HR: 9 INJECTION, SOLUTION INTRAVENOUS at 20:46

## 2023-02-13 RX ADMIN — SODIUM CHLORIDE 3.3 UNITS/HR: 9 INJECTION, SOLUTION INTRAVENOUS at 05:10

## 2023-02-13 RX ADMIN — SODIUM CHLORIDE 3.8 UNITS/HR: 9 INJECTION, SOLUTION INTRAVENOUS at 08:16

## 2023-02-13 RX ADMIN — ALUMINUM HYDROXIDE, MAGNESIUM HYDROXIDE, AND SIMETHICONE 30 ML: 200; 200; 20 SUSPENSION ORAL at 03:17

## 2023-02-13 RX ADMIN — SODIUM CHLORIDE 2.9 UNITS/HR: 9 INJECTION, SOLUTION INTRAVENOUS at 06:08

## 2023-02-13 RX ADMIN — POTASSIUM CHLORIDE 40 MEQ: 1500 TABLET, EXTENDED RELEASE ORAL at 13:29

## 2023-02-13 RX ADMIN — POTASSIUM CHLORIDE, DEXTROSE MONOHYDRATE AND SODIUM CHLORIDE: 300; 5; 900 INJECTION, SOLUTION INTRAVENOUS at 06:07

## 2023-02-13 RX ADMIN — DEXTROSE AND SODIUM CHLORIDE 150 ML/HR: 5; 900 INJECTION, SOLUTION INTRAVENOUS at 05:09

## 2023-02-13 RX ADMIN — POTASSIUM CHLORIDE, DEXTROSE MONOHYDRATE AND SODIUM CHLORIDE: 300; 5; 900 INJECTION, SOLUTION INTRAVENOUS at 14:06

## 2023-02-13 RX ADMIN — ENOXAPARIN SODIUM 40 MG: 100 INJECTION SUBCUTANEOUS at 09:19

## 2023-02-13 RX ADMIN — POTASSIUM CHLORIDE, DEXTROSE MONOHYDRATE AND SODIUM CHLORIDE: 300; 5; 900 INJECTION, SOLUTION INTRAVENOUS at 23:33

## 2023-02-13 RX ADMIN — SODIUM CHLORIDE, PRESERVATIVE FREE 10 ML: 5 INJECTION INTRAVENOUS at 05:09

## 2023-02-13 RX ADMIN — DULOXETINE HYDROCHLORIDE 30 MG: 30 CAPSULE, DELAYED RELEASE ORAL at 09:19

## 2023-02-13 RX ADMIN — SODIUM CHLORIDE 12.3 UNITS/HR: 9 INJECTION, SOLUTION INTRAVENOUS at 00:38

## 2023-02-13 RX ADMIN — SODIUM CHLORIDE 7.1 UNITS/HR: 9 INJECTION, SOLUTION INTRAVENOUS at 02:51

## 2023-02-13 NOTE — ACP (ADVANCE CARE PLANNING)
Advance Care Planning   Healthcare Decision Maker:       Primary Decision Maker: Miguel Ogden - Girlfriend - [phone]

## 2023-02-13 NOTE — PROGRESS NOTES
Reason for Admission:  DKA                     RUR Score:    10%                 Plan for utilizing home health:  None @ this time/uses cane. PCP: First and Last name:  Silva Monique NP     Name of Practice:    Are you a current patient: Yes/No: Yes   Approximate date of last visit: Been a while since last visit. Can you participate in a virtual visit with your PCP: Yes/Call. Current Advanced Directive/Advance Care Plan: Full Code      Healthcare Decision Maker:              Primary Decision Maker: Karishma Noble - Girlfriend - [phone]                  Transition of Care Plan:                    D/C Plan is home with girl friend & GF to transport. Send Rxs to [place] in [facility] upon discharge.

## 2023-02-13 NOTE — ED NOTES
Pt c/o chest pressure. States it feels like gas. Called dr Pierce Tavarez and updated. New orders received.

## 2023-02-13 NOTE — PROGRESS NOTES
Hospitalist Progress Note            Daily Progress Note: 2/13/2023 12:27 PM  Hospital course:   Angel Nuno is a 5year old  male with PMH of diabetes on insulin,neuropathy,  hyperlipidemia presented to the ED with complaints of nausea and vomiting for the past 1 week. Otherwise denies fever or chills, abdominal pain, shortness of breath, nausea/vomiting/diarrhea, or UTI symptoms. Noted home blood sugar being high and presented to outside ED. He is transferred here for admission. At outside facility found to have glucose greater than 500 with elevated gap acidosis. Insulin ggt was initiated. Patient reports he has been skipping insulin doses as he has been unable to get his prescription refilled. In the ED, vital signs stable. Repeat . Gap closed but HCO3 12. Mild leukocytosis  Blood glucose improving. Subjective:     Mr. Mathias Holter seen in the ED. He reports he has been low on insulin and has been giving himself less insulin to conserve what he had at home until he was able to gt in to see his doctor. He states he has been taking cymbalta for his diabetic neuropathy.      Assessment/Plan:   Active Problems:    DKA (diabetic ketoacidosis) ([facility] [address] (2/12/2023)    DKA  -IV insulin ggt per protocol  -D5 NS at 150 ml/hr  -BNP Q 4 h and POC glucose Q 1 hour  - Possible infection none  -Full liquid diet    Type 2 Diabetes  -Restart home dose lantus 30 units when HCO3 > 18  -HgB A1C 10. 4    Hypokalemia  -Potassium 40 meq x 1 oral  -CMP in the am    Social Determents of Health: None          DVT Prophylaxis: Lovenox  Code Status: Full Code  POA/NOK:    Disposition and discharge barriers:   Wean continuous insulin  Care Plan discussed with:     Current Facility-Administered Medications   Medication Dose Route Frequency    dextrose 5% - 0.9% NaCl with KCl 40 mEq/L infusion   IntraVENous CONTINUOUS    dextrose 5% and 0.9% NaCl infusion  150 mL/hr IntraVENous CONTINUOUS    DULoxetine (CYMBALTA) capsule 30 mg 30 mg Oral DAILY    sodium chloride (NS) flush 5-40 mL  5-40 mL IntraVENous Q8H    sodium chloride (NS) flush 5-40 mL  5-40 mL IntraVENous PRN    acetaminophen (TYLENOL) tablet 650 mg  650 mg Oral Q6H PRN    Or    acetaminophen (TYLENOL) suppository 650 mg  650 mg Rectal Q6H PRN    polyethylene glycol (MIRALAX) packet 17 g  17 g Oral DAILY PRN    ondansetron (ZOFRAN ODT) tablet 4 mg  4 mg Oral Q8H PRN    Or    ondansetron (ZOFRAN) injection 4 mg  4 mg IntraVENous Q6H PRN    enoxaparin (LOVENOX) injection 40 mg  40 mg SubCUTAneous DAILY    insulin regular (NOVOLIN R, HUMULIN R) 100 Units in 0.9% sodium chloride 100 mL infusion  0-50 Units/hr IntraVENous TITRATE    glucose chewable tablet 16 g  4 Tablet Oral PRN    glucagon (GLUCAGEN) injection 1 mg  1 mg IntraMUSCular PRN    dextrose 10% infusion 0-250 mL  0-250 mL IntraVENous PRN     Current Outpatient Medications   Medication Sig    DULoxetine (Cymbalta) 30 mg capsule Take 30 mg by mouth daily. insulin glargine (LANTUS,BASAGLAR) 100 unit/mL (3 mL) inpn 40 Units by SubCUTAneous route daily. Inject 30 units every morning (Patient taking differently: 30 Units by SubCUTAneous route daily. Inject 30 units every morning)    OneTouch Delica Plus Lancet 30 gauge misc USE TO CHECK GLUCOSE TWICE DAILY (Patient not taking: Reported on 2/12/2023)    pregabalin (LYRICA) 100 mg capsule Take 100 mg by mouth two (2) times a day. (Patient not taking: Reported on 2/12/2023)    metFORMIN (GLUCOPHAGE) 1,000 mg tablet Take 1 tablet by mouth twice daily  Indications: type 2 diabetes mellitus (Patient not taking: Reported on 2/12/2023)    rosuvastatin (CRESTOR) 10 mg tablet Take 1 Tablet by mouth nightly. Indications: high cholesterol and high triglycerides (Patient not taking: Reported on 2/12/2023)        REVIEW OF SYSTEMS    Review of Systems   Constitutional:  Negative for chills and fever. HENT:  Negative for hearing loss. Eyes:  Negative for blurred vision.    Respiratory: Negative for cough, shortness of breath and wheezing. Cardiovascular:  Negative for chest pain and leg swelling. Gastrointestinal:  Negative for abdominal pain, heartburn, nausea and vomiting. Genitourinary: Negative. Skin:  Negative for rash. Neurological:  Negative for dizziness and headaches. Objective:     Visit Vitals  /75   Pulse 89   Temp 98 °F (36.7 °C)   Resp 13   Ht 5' 7\" (1.702 m)   Wt 71.7 kg (158 lb)   SpO2 100%   BMI 24.75 kg/m²      O2 Device: None (Room air)    Temp (24hrs), Av.7 °F (36.5 °C), Min:97.3 °F (36.3 °C), Max:98 °F (36.7 °C)        PHYSICAL EXAM:    Physical Exam  Constitutional:       Appearance: He is ill-appearing. HENT:      Head: Normocephalic and atraumatic. Cardiovascular:      Rate and Rhythm: Regular rhythm. Tachycardia present. Heart sounds: No murmur heard. No friction rub. No gallop. Pulmonary:      Effort: Pulmonary effort is normal.      Breath sounds: No wheezing, rhonchi or rales. Abdominal:      General: Bowel sounds are normal. There is no distension. Palpations: Abdomen is soft. Tenderness: There is no abdominal tenderness. Musculoskeletal:      Right lower leg: No edema. Left lower leg: No edema. Skin:     General: Skin is warm and dry. Coloration: Skin is pale. Neurological:      Mental Status: He is alert and oriented to person, place, and time.    Psychiatric:         Mood and Affect: Mood normal.         Behavior: Behavior normal.        Data Review    Recent Results (from the past 24 hour(s))   EKG, 12 LEAD, INITIAL    Collection Time: 23  4:27 PM   Result Value Ref Range    Ventricular Rate 106 BPM    Atrial Rate 106 BPM    P-R Interval 140 ms    QRS Duration 93 ms    Q-T Interval 368 ms    QTC Calculation (Bezet) 489 ms    Calculated P Axis 57 degrees    Calculated R Axis -16 degrees    Calculated T Axis 43 degrees    Diagnosis       Sinus tachycardia  Borderline left axis deviation  ST elev, probable normal early repol pattern  Borderline prolonged QT interval  Baseline wander in lead(s) I,II,III,aVR,aVL    Confirmed by Raffy Arce ([id number]) on 2/13/2023 8:30:09 AM     GLUCOSE, POC    Collection Time: 02/12/23  4:33 PM   Result Value Ref Range    Glucose (POC) 529 (H) 65 - 100 mg/dL    Performed by JOEY NOVAK    CBC WITH AUTOMATED DIFF    Collection Time: 02/12/23  4:39 PM   Result Value Ref Range    WBC 11.5 (H) 4.1 - 11.1 K/uL    RBC 5.36 4.10 - 5.70 M/uL    HGB 16.0 12.1 - 17.0 g/dL    HCT 45.9 36.6 - 50.3 %    MCV 85.6 80.0 - 99.0 FL    MCH 29.9 26.0 - 34.0 PG    MCHC 34.9 30.0 - 36.5 g/dL    RDW 12.7 11.5 - 14.5 %    PLATELET 938 396 - 400 K/uL    MPV 10.8 8.9 - 12.9 FL    NRBC 0.0 0.0  WBC    ABSOLUTE NRBC 0.00 0.00 - 0.01 K/uL    NEUTROPHILS 76 (H) 32 - 75 %    LYMPHOCYTES 18 12 - 49 %    MONOCYTES 3 (L) 5 - 13 %    EOSINOPHILS 2 0 - 7 %    BASOPHILS 0 0 - 1 %    IMMATURE GRANULOCYTES 1 (H) 0 - 0.5 %    ABS. NEUTROPHILS 8.6 (H) 1.8 - 8.0 K/UL    ABS. LYMPHOCYTES 2.1 0.8 - 3.5 K/UL    ABS. MONOCYTES 0.4 0.0 - 1.0 K/UL    ABS. EOSINOPHILS 0.3 0.0 - 0.4 K/UL    ABS. BASOPHILS 0.0 0.0 - 0.1 K/UL    ABS. IMM. GRANS. 0.1 (H) 0.00 - 0.04 K/UL    DF AUTOMATED     METABOLIC PANEL, COMPREHENSIVE    Collection Time: 02/12/23  4:39 PM   Result Value Ref Range    Sodium 129 (L) 136 - 145 mmol/L    Potassium 4.6 3.5 - 5.1 mmol/L    Chloride 96 (L) 97 - 108 mmol/L    CO2 8 (LL) 21 - 32 mmol/L    Anion gap 25 (H) 5 - 15 mmol/L    Glucose 486 (H) 65 - 100 mg/dL    BUN 13 6 - 20 mg/dL    Creatinine 1.31 (H) 0.70 - 1.30 mg/dL    BUN/Creatinine ratio 10 (L) 12 - 20      eGFR >60 >60 ml/min/1.73m2    Calcium 9.4 8.5 - 10.1 mg/dL    Bilirubin, total 0.5 0.2 - 1.0 mg/dL    AST (SGOT) 9 (L) 15 - 37 U/L    ALT (SGPT) 19 12 - 78 U/L    Alk.  phosphatase 177 (H) 45 - 117 U/L    Protein, total 8.8 (H) 6.4 - 8.2 g/dL    Albumin 4.1 3.5 - 5.0 g/dL    Globulin 4.7 (H) 2.0 - 4.0 g/dL    A-G Ratio 0.9 (L) 1.1 - 2. 2     TROPONIN-HIGH SENSITIVITY    Collection Time: 02/12/23  4:39 PM   Result Value Ref Range    Troponin-High Sensitivity 16 0 - 76 ng/L   URINALYSIS W/ RFLX MICROSCOPIC    Collection Time: 02/12/23  5:10 PM   Result Value Ref Range    Color Yellow/Straw      Appearance Clear Clear      Specific gravity >1.030 (H) 1.003 - 1.030    pH (UA) 6.0 5.0 - 8.0      Protein 100 (A) Negative mg/dL    Glucose 500 (A) Negative mg/dL    Ketone >80 (A) Negative mg/dL    Bilirubin Negative Negative      Blood Trace (A) Negative      Urobilinogen 0.2 0.2 - 1.0 EU/dL    Nitrites Negative Negative      Leukocyte Esterase Negative Negative     URINE MICROSCOPIC    Collection Time: 02/12/23  5:10 PM   Result Value Ref Range    WBC 0-4 0 - 5 /hpf    RBC 0-5 0 - 3 /hpf    Bacteria Negative Negative /hpf   GLUCOSE, POC    Collection Time: 02/12/23  5:36 PM   Result Value Ref Range    Glucose (POC) 325 (H) 65 - 100 mg/dL    Performed by Santhosh Vanegas, POC    Collection Time: 02/12/23  6:36 PM   Result Value Ref Range    Glucose (POC) 241 (H) 65 - 100 mg/dL    Performed by Enrike Soto    BLOOD GAS,CHEM8,LACTIC ACID POC    Collection Time: 02/12/23  8:22 PM   Result Value Ref Range    pH (POC) 7.16 (LL) 7.35 - 7.45      pCO2 (POC) 34.8 (L) 35.0 - 45.0 mmHg    pO2 (POC) 27 (LL) 75 - 100 mmHg    Sodium,  (L) 136 - 145 mmol/L    Potassium, POC 4.0 3.5 - 5.5 mmol/L    Calcium, ionized (POC) 1.31 1.12 - 1.32 mmol/L    Glucose,  (H) 65 - 100 mg/dL    Chloride,  (H) 98 - 107 MMOL/L    Creatinine, POC 0.69 0.6 - 1.3 MG/DL    eGFR (POC) >60 >60 ml/min/1.73m2    Base deficit (POC) 15.5 mmol/L    HCO3 (POC) 12.3 (L) 19.0 - 28.0 mmol/L    CO2, POC 12 MMOL/L    Sample source Venous      Performed by Samantha Shutter     Lactic Acid (POC) 0.75 0.40 - 2.00 mmol/L    Respiratory Rate PENDING     FIO2, L/min PENDING     Critical value read back GEM     O2 SAT 37 %   GLUCOSE, POC    Collection Time: 02/12/23  9:17 PM   Result Value Ref Range    Glucose (POC) 158 (H) 65 - 100 mg/dL    Performed by Michael Good    Collection Time: 02/12/23  9:25 PM   Result Value Ref Range    Glucose 158 mg/dL    Insulin order 2.0 units/hour    Insulin adminstered 2.0 units/hour    Multiplier 0.020     Low target 150 mg/dL    High target 200 mg/dL    D50 order 0.0 ml    D50 administered 0.00 ml    Minutes until next BG 60 min    Order initials mgh     Administered initials mgh    GLUCOSE, POC    Collection Time: 02/12/23 10:31 PM   Result Value Ref Range    Glucose (POC) 211 (H) 65 - 100 mg/dL    Performed by GISELA RUVALCABA    GLUCOSTABILIZER    Collection Time: 02/12/23 10:32 PM   Result Value Ref Range    Glucose 211 mg/dL    Insulin order 4.5 units/hour    Insulin adminstered 4.5 units/hour    Multiplier 0.030     Low target 150 mg/dL    High target 200 mg/dL    D50 order 0.0 ml    D50 administered 0.00 ml    Minutes until next BG 60 min    Order initials . mgh     Administered initials mgh    METABOLIC PANEL, BASIC    Collection Time: 02/12/23 11:15 PM   Result Value Ref Range    Sodium 134 (L) 136 - 145 mmol/L    Potassium 3.3 (L) 3.5 - 5.1 mmol/L    Chloride 109 (H) 97 - 108 mmol/L    CO2 15 (LL) 21 - 32 mmol/L    Anion gap 10 5 - 15 mmol/L    Glucose 299 (H) 65 - 100 mg/dL    BUN 10 6 - 20 mg/dL    Creatinine 1.03 0.70 - 1.30 mg/dL    BUN/Creatinine ratio 10 (L) 12 - 20      eGFR >60 >60 ml/min/1.73m2    Calcium 8.3 (L) 8.5 - 10.1 mg/dL   GLUCOSE, POC    Collection Time: 02/12/23 11:36 PM   Result Value Ref Range    Glucose (POC) 308 (H) 65 - 100 mg/dL    Performed by Nayely Valenzuela    GLUCOSTABILIZER    Collection Time: 02/12/23 11:37 PM   Result Value Ref Range    Glucose 308 mg/dL    Insulin order 9.9 units/hour    Insulin adminstered 9.9 units/hour    Multiplier 0.040     Low target 150 mg/dL    High target 200 mg/dL    D50 order 0.0 ml    D50 administered 0.00 ml    Minutes until next BG 60 min    Order initials mikala Administered initials mikala    GLUCOSE, POC    Collection Time: 02/13/23 12:36 AM   Result Value Ref Range    Glucose (POC) 305 (H) 65 - 100 mg/dL    Performed by Miguel Ash    Collection Time: 02/13/23 12:37 AM   Result Value Ref Range    Glucose 305 mg/dL    Insulin order 12.3 units/hour    Insulin adminstered 12.3 units/hour    Multiplier 0.050     Low target 150 mg/dL    High target 200 mg/dL    D50 order 0.0 ml    D50 administered 0.00 ml    Minutes until next BG 60 min    Order initials mikala     Administered initials mikala    GLUCOSE, POC    Collection Time: 02/13/23  1:44 AM   Result Value Ref Range    Glucose (POC) 215 (H) 65 - 100 mg/dL    Performed by [facility]    GLUCOSTABILIZER    Collection Time: 02/13/23  1:44 AM   Result Value Ref Range    Glucose 215 mg/dL    Insulin order 9.3 units/hour    Insulin adminstered 9.3 units/hour    Multiplier 0.060     Low target 150 mg/dL    High target 200 mg/dL    D50 order 0.0 ml    D50 administered 0.00 ml    Minutes until next BG 60 min    Order initials mikala     Administered initials mikala    GLUCOSE, POC    Collection Time: 02/13/23  2:48 AM   Result Value Ref Range    Glucose (POC) 179 (H) 65 - 100 mg/dL    Performed by [facility]    GLUCOSTABILIZER    Collection Time: 02/13/23  2:48 AM   Result Value Ref Range    Glucose 179 mg/dL    Insulin order 7.1 units/hour    Insulin adminstered 7.1 units/hour    Multiplier 0.060     Low target 150 mg/dL    High target 200 mg/dL    D50 order 0.0 ml    D50 administered 0.00 ml    Minutes until next BG 60 min    Order initials mikala     Administered initials mikala    EKG, 12 LEAD, INITIAL    Collection Time: 02/13/23  3:05 AM   Result Value Ref Range    Ventricular Rate 100 BPM    Atrial Rate 100 BPM    P-R Interval 134 ms    QRS Duration 84 ms    Q-T Interval 394 ms    QTC Calculation (Bezet) 508 ms    Calculated P Axis 15 degrees    Calculated R Axis -17 degrees    Calculated T Axis 13 degrees    Diagnosis Normal sinus rhythm  Prolonged QT  Abnormal ECG  No previous ECGs available  Confirmed by Jordi Navarrete ([id number]) on 2/13/2023 8:48:36 AM     CBC W/O DIFF    Collection Time: 02/13/23  3:57 AM   Result Value Ref Range    WBC 7.7 4.1 - 11.1 K/uL    RBC 4.23 4. 10 - 5.70 M/uL    HGB 12.4 12.1 - 17.0 g/dL    HCT 35.0 (L) 36.6 - 50.3 %    MCV 82.7 80.0 - 99.0 FL    MCH 29.3 26.0 - 34.0 PG    MCHC 35.4 30.0 - 36.5 g/dL    RDW 12.6 11.5 - 14.5 %    PLATELET 049 467 - 989 K/uL    MPV 11.0 8.9 - 12.9 FL    NRBC 0.0 0.0  WBC    ABSOLUTE NRBC 0.00 0.00 - 5.32 K/uL   METABOLIC PANEL, BASIC    Collection Time: 02/13/23  3:57 AM   Result Value Ref Range    Sodium 136 136 - 145 mmol/L    Potassium 3.1 (L) 3.5 - 5.1 mmol/L    Chloride 113 (H) 97 - 108 mmol/L    CO2 15 (LL) 21 - 32 mmol/L    Anion gap 8 5 - 15 mmol/L    Glucose 201 (H) 65 - 100 mg/dL    BUN 12 6 - 20 mg/dL    Creatinine 0.73 0.70 - 1.30 mg/dL    BUN/Creatinine ratio 16 12 - 20      eGFR >60 >60 ml/min/1.73m2    Calcium 8.4 (L) 8.5 - 10.1 mg/dL   HEMOGLOBIN A1C WITH EAG    Collection Time: 02/13/23  3:57 AM   Result Value Ref Range    Hemoglobin A1c 10.4 (H) 4.0 - 5.6 %    Est. average glucose 252 mg/dL   TROPONIN-HIGH SENSITIVITY    Collection Time: 02/13/23  3:57 AM   Result Value Ref Range    Troponin-High Sensitivity 16 0 - 76 ng/L   GLUCOSE, POC    Collection Time: 02/13/23  3:59 AM   Result Value Ref Range    Glucose (POC) 198 (H) 65 - 100 mg/dL    Performed by Luciana Lozano    Collection Time: 02/13/23  4:01 AM   Result Value Ref Range    Glucose 198 mg/dL    Insulin order 8.3 units/hour    Insulin adminstered 8.3 units/hour    Multiplier 0.060     Low target 150 mg/dL    High target 200 mg/dL    D50 order 0.0 ml    D50 administered 0.00 ml    Minutes until next BG 60 min    Order initials mikala     Administered initials mikala    GLUCOSE, POC    Collection Time: 02/13/23  5:07 AM   Result Value Ref Range    Glucose (POC) 129 (H) 65 - 100 mg/dL    Performed by Anais Ortiz    Collection Time: 02/13/23  5:07 AM   Result Value Ref Range    Glucose 129 mg/dL    Insulin order 3.3 units/hour    Insulin adminstered 3.3 units/hour    Multiplier 0.048     Low target 150 mg/dL    High target 200 mg/dL    D50 order 0.0 ml    D50 administered 0.00 ml    Minutes until next BG 60 min    Order initials mikala     Administered initials mikala    GLUCOSE, POC    Collection Time: 02/13/23  6:06 AM   Result Value Ref Range    Glucose (POC) 137 (H) 65 - 100 mg/dL    Performed by Anais Ortiz    Collection Time: 02/13/23  6:06 AM   Result Value Ref Range    Glucose 137 mg/dL    Insulin order 2.9 units/hour    Insulin adminstered 2.9 units/hour    Multiplier 0.038     Low target 150 mg/dL    High target 200 mg/dL    D50 order 0.0 ml    D50 administered 0.00 ml    Minutes until next BG 60 min    Order initials mikala     Administered initials mkiala    BLOOD GAS, ARTERIAL    Collection Time: 02/13/23  6:35 AM   Result Value Ref Range    pH 7.29 (L) 7.35 - 7.45      PCO2 37 35 - 45 mmHg    PO2 111 (H) 80 - 100 mmHg    O2 SATURATION 98 95 - 99 %    BICARBONATE 17 (L) 22 - 26 mmol/L    BASE DEFICIT 8.6 mmol/L    O2 METHOD Room air      FIO2 21 %    Sample source Arterial      SITE Left Radial      CARISA'S TEST YES      Carboxy-Hgb 0.7 (L) 1 - 2 %    Methemoglobin 0.3 0 - 1.4 %    Oxyhemoglobin 97.3 95 - 99 %    Performed by Isaak Khanna     TEMPERATURE 98.6     GLUCOSE, POC    Collection Time: 02/13/23  7:13 AM   Result Value Ref Range    Glucose (POC) 156 (H) 65 - 100 mg/dL    Performed by Keron Girard    Collection Time: 02/13/23  7:13 AM   Result Value Ref Range    Glucose 156 mg/dL    Insulin order 3.6 units/hour    Insulin adminstered 3.6 units/hour    Multiplier 0.038     Low target 150 mg/dL    High target 200 mg/dL    D50 order 0.0 ml    D50 administered 0.00 ml    Minutes until next BG 60 min    Order initials loretta Administered initials ge    GLUCOSE, POC    Collection Time: 02/13/23  8:15 AM   Result Value Ref Range    Glucose (POC) 161 (H) 65 - 100 mg/dL    Performed by Rizzoma    Collection Time: 02/13/23  8:16 AM   Result Value Ref Range    Glucose 161 mg/dL    Insulin order 3.8 units/hour    Insulin adminstered 3.8 units/hour    Multiplier 0.038     Low target 150 mg/dL    High target 200 mg/dL    D50 order 0.0 ml    D50 administered 0.00 ml    Minutes until next BG 60 min    Order initials ge     Administered initials ge    METABOLIC PANEL, BASIC    Collection Time: 02/13/23  9:02 AM   Result Value Ref Range    Sodium 138 136 - 145 mmol/L    Potassium 3.0 (L) 3.5 - 5.1 mmol/L    Chloride 114 (H) 97 - 108 mmol/L    CO2 18 (L) 21 - 32 mmol/L    Anion gap 6 5 - 15 mmol/L    Glucose 174 (H) 65 - 100 mg/dL    BUN 11 6 - 20 mg/dL    Creatinine 0.82 0.70 - 1.30 mg/dL    BUN/Creatinine ratio 13 12 - 20      eGFR >60 >60 ml/min/1.73m2    Calcium 8.2 (L) 8.5 - 10.1 mg/dL   GLUCOSE, POC    Collection Time: 02/13/23  9:18 AM   Result Value Ref Range    Glucose (POC) 163 (H) 65 - 100 mg/dL    Performed by Valon Laserss    Collection Time: 02/13/23  9:18 AM   Result Value Ref Range    Glucose 163 mg/dL    Insulin order 3.9 units/hour    Insulin adminstered 3.9 units/hour    Multiplier 0.038     Low target 150 mg/dL    High target 200 mg/dL    D50 order 0.0 ml    D50 administered 0.00 ml    Minutes until next BG 60 min    Order initials ge     Administered initials ge    GLUCOSE, POC    Collection Time: 02/13/23 10:21 AM   Result Value Ref Range    Glucose (POC) 289 (H) 65 - 100 mg/dL    Performed by Rizzoma    Collection Time: 02/13/23 10:21 AM   Result Value Ref Range    Glucose 289 mg/dL    Insulin order 11.0 units/hour    Insulin adminstered 11.0 units/hour    Multiplier 0.048     Low target 150 mg/dL    High target 200 mg/dL    D50 order 0.0 ml D50 administered 0.00 ml    Minutes until next BG 60 min    Order initials ge     Administered initials ge    GLUCOSE, POC    Collection Time: 02/13/23 11:16 AM   Result Value Ref Range    Glucose (POC) 237 (H) 65 - 100 mg/dL    Performed by Lauryn Crow    Collection Time: 02/13/23 11:17 AM   Result Value Ref Range    Glucose 237 mg/dL    Insulin order 10.3 units/hour    Insulin adminstered 10.3 units/hour    Multiplier 0.058     Low target 150 mg/dL    High target 200 mg/dL    D50 order 0.0 ml    D50 administered 0.00 ml    Minutes until next BG 60 min    Order initials ge     Administered initials ge    GLUCOSE, POC    Collection Time: 02/13/23 12:15 PM   Result Value Ref Range    Glucose (POC) 181 (H) 65 - 100 mg/dL    Performed by Lauryn Crow    Collection Time: 02/13/23 12:15 PM   Result Value Ref Range    Glucose 181 mg/dL    Insulin order 7.0 units/hour    Insulin adminstered 7.0 units/hour    Multiplier 0.058     Low target 150 mg/dL    High target 200 mg/dL    D50 order 0.0 ml    D50 administered 0.00 ml    Minutes until next BG 60 min    Order initials ge     Administered initials ge        No orders to display       Intake and Output:  Current Shift: No intake/output data recorded.   Last three shifts: 02/11 1901 - 02/13 0700  In: -   Out: 500 [Urine:500]      Lab/Data Review:  Recent Labs     02/13/23  0357 02/12/23  1639   WBC 7.7 11.5*   HGB 12.4 16.0   HCT 35.0* 45.9    245     Recent Labs     02/13/23  0902 02/13/23  0357 02/12/23  2315 02/12/23  1639    136 134* 129*   K 3.0* 3.1* 3.3* 4.6   * 113* 109* 96*   CO2 18* 15* 15* 8*   * 201* 299* 486*   BUN 11 12 10 13   CREA 0.82 0.73 1.03 1.31*   CA 8.2* 8.4* 8.3* 9.4   ALB  --   --   --  4.1   TBILI  --   --   --  0.5   ALT  --   --   --  19     Recent Labs     02/13/23  0635   PH 7.29*   PCO2 37   PO2 111*   HCO3 17*   FIO2 21     Recent Results (from the past 24 hour(s)) EKG, 12 LEAD, INITIAL    Collection Time: 02/12/23  4:27 PM   Result Value Ref Range    Ventricular Rate 106 BPM    Atrial Rate 106 BPM    P-R Interval 140 ms    QRS Duration 93 ms    Q-T Interval 368 ms    QTC Calculation (Bezet) 489 ms    Calculated P Axis 57 degrees    Calculated R Axis -16 degrees    Calculated T Axis 43 degrees    Diagnosis       Sinus tachycardia  Borderline left axis deviation  ST elev, probable normal early repol pattern  Borderline prolonged QT interval  Baseline wander in lead(s) I,II,III,aVR,aVL    Confirmed by Mitchel Emmanuel ([id number]) on 2/13/2023 8:30:09 AM     GLUCOSE, POC    Collection Time: 02/12/23  4:33 PM   Result Value Ref Range    Glucose (POC) 529 (H) 65 - 100 mg/dL    Performed by JOEY NOVAK    CBC WITH AUTOMATED DIFF    Collection Time: 02/12/23  4:39 PM   Result Value Ref Range    WBC 11.5 (H) 4.1 - 11.1 K/uL    RBC 5.36 4.10 - 5.70 M/uL    HGB 16.0 12.1 - 17.0 g/dL    HCT 45.9 36.6 - 50.3 %    MCV 85.6 80.0 - 99.0 FL    MCH 29.9 26.0 - 34.0 PG    MCHC 34.9 30.0 - 36.5 g/dL    RDW 12.7 11.5 - 14.5 %    PLATELET 763 229 - 387 K/uL    MPV 10.8 8.9 - 12.9 FL    NRBC 0.0 0.0  WBC    ABSOLUTE NRBC 0.00 0.00 - 0.01 K/uL    NEUTROPHILS 76 (H) 32 - 75 %    LYMPHOCYTES 18 12 - 49 %    MONOCYTES 3 (L) 5 - 13 %    EOSINOPHILS 2 0 - 7 %    BASOPHILS 0 0 - 1 %    IMMATURE GRANULOCYTES 1 (H) 0 - 0.5 %    ABS. NEUTROPHILS 8.6 (H) 1.8 - 8.0 K/UL    ABS. LYMPHOCYTES 2.1 0.8 - 3.5 K/UL    ABS. MONOCYTES 0.4 0.0 - 1.0 K/UL    ABS. EOSINOPHILS 0.3 0.0 - 0.4 K/UL    ABS. BASOPHILS 0.0 0.0 - 0.1 K/UL    ABS. IMM.  GRANS. 0.1 (H) 0.00 - 0.04 K/UL    DF AUTOMATED     METABOLIC PANEL, COMPREHENSIVE    Collection Time: 02/12/23  4:39 PM   Result Value Ref Range    Sodium 129 (L) 136 - 145 mmol/L    Potassium 4.6 3.5 - 5.1 mmol/L    Chloride 96 (L) 97 - 108 mmol/L    CO2 8 (LL) 21 - 32 mmol/L    Anion gap 25 (H) 5 - 15 mmol/L    Glucose 486 (H) 65 - 100 mg/dL    BUN 13 6 - 20 mg/dL Creatinine 1.31 (H) 0.70 - 1.30 mg/dL    BUN/Creatinine ratio 10 (L) 12 - 20      eGFR >60 >60 ml/min/1.73m2    Calcium 9.4 8.5 - 10.1 mg/dL    Bilirubin, total 0.5 0.2 - 1.0 mg/dL    AST (SGOT) 9 (L) 15 - 37 U/L    ALT (SGPT) 19 12 - 78 U/L    Alk.  phosphatase 177 (H) 45 - 117 U/L    Protein, total 8.8 (H) 6.4 - 8.2 g/dL    Albumin 4.1 3.5 - 5.0 g/dL    Globulin 4.7 (H) 2.0 - 4.0 g/dL    A-G Ratio 0.9 (L) 1.1 - 2.2     TROPONIN-HIGH SENSITIVITY    Collection Time: 02/12/23  4:39 PM   Result Value Ref Range    Troponin-High Sensitivity 16 0 - 76 ng/L   URINALYSIS W/ RFLX MICROSCOPIC    Collection Time: 02/12/23  5:10 PM   Result Value Ref Range    Color Yellow/Straw      Appearance Clear Clear      Specific gravity >1.030 (H) 1.003 - 1.030    pH (UA) 6.0 5.0 - 8.0      Protein 100 (A) Negative mg/dL    Glucose 500 (A) Negative mg/dL    Ketone >80 (A) Negative mg/dL    Bilirubin Negative Negative      Blood Trace (A) Negative      Urobilinogen 0.2 0.2 - 1.0 EU/dL    Nitrites Negative Negative      Leukocyte Esterase Negative Negative     URINE MICROSCOPIC    Collection Time: 02/12/23  5:10 PM   Result Value Ref Range    WBC 0-4 0 - 5 /hpf    RBC 0-5 0 - 3 /hpf    Bacteria Negative Negative /hpf   GLUCOSE, POC    Collection Time: 02/12/23  5:36 PM   Result Value Ref Range    Glucose (POC) 325 (H) 65 - 100 mg/dL    Performed by Paola Mini    GLUCOSE, POC    Collection Time: 02/12/23  6:36 PM   Result Value Ref Range    Glucose (POC) 241 (H) 65 - 100 mg/dL    Performed by Paola Mini    BLOOD GAS,CHEM8,LACTIC ACID POC    Collection Time: 02/12/23  8:22 PM   Result Value Ref Range    pH (POC) 7.16 (LL) 7.35 - 7.45      pCO2 (POC) 34.8 (L) 35.0 - 45.0 mmHg    pO2 (POC) 27 (LL) 75 - 100 mmHg    Sodium,  (L) 136 - 145 mmol/L    Potassium, POC 4.0 3.5 - 5.5 mmol/L    Calcium, ionized (POC) 1.31 1.12 - 1.32 mmol/L    Glucose,  (H) 65 - 100 mg/dL    Chloride,  (H) 98 - 107 MMOL/L    Creatinine, POC 0.69 0.6 - 1.3 MG/DL    eGFR (POC) >60 >60 ml/min/1.73m2    Base deficit (POC) 15.5 mmol/L    HCO3 (POC) 12.3 (L) 19.0 - 28.0 mmol/L    CO2, POC 12 MMOL/L    Sample source Venous      Performed by Ji Gutierrez     Lactic Acid (POC) 0.75 0.40 - 2.00 mmol/L    Respiratory Rate PENDING     FIO2, L/min PENDING     Critical value read back GEM     O2 SAT 37 %   GLUCOSE, POC    Collection Time: 02/12/23  9:17 PM   Result Value Ref Range    Glucose (POC) 158 (H) 65 - 100 mg/dL    Performed by Murtaza Boykins    Collection Time: 02/12/23  9:25 PM   Result Value Ref Range    Glucose 158 mg/dL    Insulin order 2.0 units/hour    Insulin adminstered 2.0 units/hour    Multiplier 0.020     Low target 150 mg/dL    High target 200 mg/dL    D50 order 0.0 ml    D50 administered 0.00 ml    Minutes until next BG 60 min    Order initials mgh     Administered initials mgh    GLUCOSE, POC    Collection Time: 02/12/23 10:31 PM   Result Value Ref Range    Glucose (POC) 211 (H) 65 - 100 mg/dL    Performed by GISELA RUVALCABA    GLUCOSTABILIZER    Collection Time: 02/12/23 10:32 PM   Result Value Ref Range    Glucose 211 mg/dL    Insulin order 4.5 units/hour    Insulin adminstered 4.5 units/hour    Multiplier 0.030     Low target 150 mg/dL    High target 200 mg/dL    D50 order 0.0 ml    D50 administered 0.00 ml    Minutes until next BG 60 min    Order initials . mgh     Administered initials mgh    METABOLIC PANEL, BASIC    Collection Time: 02/12/23 11:15 PM   Result Value Ref Range    Sodium 134 (L) 136 - 145 mmol/L    Potassium 3.3 (L) 3.5 - 5.1 mmol/L    Chloride 109 (H) 97 - 108 mmol/L    CO2 15 (LL) 21 - 32 mmol/L    Anion gap 10 5 - 15 mmol/L    Glucose 299 (H) 65 - 100 mg/dL    BUN 10 6 - 20 mg/dL    Creatinine 1.03 0.70 - 1.30 mg/dL    BUN/Creatinine ratio 10 (L) 12 - 20      eGFR >60 >60 ml/min/1.73m2    Calcium 8.3 (L) 8.5 - 10.1 mg/dL   GLUCOSE, POC    Collection Time: 02/12/23 11:36 PM   Result Value Ref Range Glucose (POC) 308 (H) 65 - 100 mg/dL    Performed by [facility]    GLUCOSTABILIZER    Collection Time: 02/12/23 11:37 PM   Result Value Ref Range    Glucose 308 mg/dL    Insulin order 9.9 units/hour    Insulin adminstered 9.9 units/hour    Multiplier 0.040     Low target 150 mg/dL    High target 200 mg/dL    D50 order 0.0 ml    D50 administered 0.00 ml    Minutes until next BG 60 min    Order initials mikala     Administered initials mikala    GLUCOSE, POC    Collection Time: 02/13/23 12:36 AM   Result Value Ref Range    Glucose (POC) 305 (H) 65 - 100 mg/dL    Performed by [employer]    GLUCOSTABILIZER    Collection Time: 02/13/23 12:37 AM   Result Value Ref Range    Glucose 305 mg/dL    Insulin order 12.3 units/hour    Insulin adminstered 12.3 units/hour    Multiplier 0.050     Low target 150 mg/dL    High target 200 mg/dL    D50 order 0.0 ml    D50 administered 0.00 ml    Minutes until next BG 60 min    Order initials mikala     Administered initials mikala    GLUCOSE, POC    Collection Time: 02/13/23  1:44 AM   Result Value Ref Range    Glucose (POC) 215 (H) 65 - 100 mg/dL    Performed by [employer]    GLUCOSTABILIZER    Collection Time: 02/13/23  1:44 AM   Result Value Ref Range    Glucose 215 mg/dL    Insulin order 9.3 units/hour    Insulin adminstered 9.3 units/hour    Multiplier 0.060     Low target 150 mg/dL    High target 200 mg/dL    D50 order 0.0 ml    D50 administered 0.00 ml    Minutes until next BG 60 min    Order initials mikala     Administered initials mikala    GLUCOSE, POC    Collection Time: 02/13/23  2:48 AM   Result Value Ref Range    Glucose (POC) 179 (H) 65 - 100 mg/dL    Performed by [facility]    GLUCOSTABILIZER    Collection Time: 02/13/23  2:48 AM   Result Value Ref Range    Glucose 179 mg/dL    Insulin order 7.1 units/hour    Insulin adminstered 7.1 units/hour    Multiplier 0.060     Low target 150 mg/dL    High target 200 mg/dL    D50 order 0.0 ml    D50 administered 0.00 ml    Minutes until next BG 60 min    Order initials mikala     Administered initials mikala    EKG, 12 LEAD, INITIAL    Collection Time: 02/13/23  3:05 AM   Result Value Ref Range    Ventricular Rate 100 BPM    Atrial Rate 100 BPM    P-R Interval 134 ms    QRS Duration 84 ms    Q-T Interval 394 ms    QTC Calculation (Bezet) 508 ms    Calculated P Axis 15 degrees    Calculated R Axis -17 degrees    Calculated T Axis 13 degrees    Diagnosis       Normal sinus rhythm  Prolonged QT  Abnormal ECG  No previous ECGs available  Confirmed by Sherry Horner ([id number]) on 2/13/2023 8:48:36 AM     CBC W/O DIFF    Collection Time: 02/13/23  3:57 AM   Result Value Ref Range    WBC 7.7 4.1 - 11.1 K/uL    RBC 4.23 4. 10 - 5.70 M/uL    HGB 12.4 12.1 - 17.0 g/dL    HCT 35.0 (L) 36.6 - 50.3 %    MCV 82.7 80.0 - 99.0 FL    MCH 29.3 26.0 - 34.0 PG    MCHC 35.4 30.0 - 36.5 g/dL    RDW 12.6 11.5 - 14.5 %    PLATELET 940 863 - 869 K/uL    MPV 11.0 8.9 - 12.9 FL    NRBC 0.0 0.0  WBC    ABSOLUTE NRBC 0.00 0.00 - 9.75 K/uL   METABOLIC PANEL, BASIC    Collection Time: 02/13/23  3:57 AM   Result Value Ref Range    Sodium 136 136 - 145 mmol/L    Potassium 3.1 (L) 3.5 - 5.1 mmol/L    Chloride 113 (H) 97 - 108 mmol/L    CO2 15 (LL) 21 - 32 mmol/L    Anion gap 8 5 - 15 mmol/L    Glucose 201 (H) 65 - 100 mg/dL    BUN 12 6 - 20 mg/dL    Creatinine 0.73 0.70 - 1.30 mg/dL    BUN/Creatinine ratio 16 12 - 20      eGFR >60 >60 ml/min/1.73m2    Calcium 8.4 (L) 8.5 - 10.1 mg/dL   HEMOGLOBIN A1C WITH EAG    Collection Time: 02/13/23  3:57 AM   Result Value Ref Range    Hemoglobin A1c 10.4 (H) 4.0 - 5.6 %    Est. average glucose 252 mg/dL   TROPONIN-HIGH SENSITIVITY    Collection Time: 02/13/23  3:57 AM   Result Value Ref Range    Troponin-High Sensitivity 16 0 - 76 ng/L   GLUCOSE, POC    Collection Time: 02/13/23  3:59 AM   Result Value Ref Range    Glucose (POC) 198 (H) 65 - 100 mg/dL    Performed by Brad Chavez    Collection Time: 02/13/23  4:01 AM   Result Value Ref Range    Glucose 198 mg/dL    Insulin order 8.3 units/hour    Insulin adminstered 8.3 units/hour    Multiplier 0.060     Low target 150 mg/dL    High target 200 mg/dL    D50 order 0.0 ml    D50 administered 0.00 ml    Minutes until next BG 60 min    Order initials mikala     Administered initials mikala    GLUCOSE, POC    Collection Time: 02/13/23  5:07 AM   Result Value Ref Range    Glucose (POC) 129 (H) 65 - 100 mg/dL    Performed by Pawan Uriostegui    GLUCOSTABILIZER    Collection Time: 02/13/23  5:07 AM   Result Value Ref Range    Glucose 129 mg/dL    Insulin order 3.3 units/hour    Insulin adminstered 3.3 units/hour    Multiplier 0.048     Low target 150 mg/dL    High target 200 mg/dL    D50 order 0.0 ml    D50 administered 0.00 ml    Minutes until next BG 60 min    Order initials mikala     Administered initials mikala    GLUCOSE, POC    Collection Time: 02/13/23  6:06 AM   Result Value Ref Range    Glucose (POC) 137 (H) 65 - 100 mg/dL    Performed by Pawan Uriostegui    GLUCOSTABILIZER    Collection Time: 02/13/23  6:06 AM   Result Value Ref Range    Glucose 137 mg/dL    Insulin order 2.9 units/hour    Insulin adminstered 2.9 units/hour    Multiplier 0.038     Low target 150 mg/dL    High target 200 mg/dL    D50 order 0.0 ml    D50 administered 0.00 ml    Minutes until next BG 60 min    Order initials mikala     Administered initials mikala    BLOOD GAS, ARTERIAL    Collection Time: 02/13/23  6:35 AM   Result Value Ref Range    pH 7.29 (L) 7.35 - 7.45      PCO2 37 35 - 45 mmHg    PO2 111 (H) 80 - 100 mmHg    O2 SATURATION 98 95 - 99 %    BICARBONATE 17 (L) 22 - 26 mmol/L    BASE DEFICIT 8.6 mmol/L    O2 METHOD Room air      FIO2 21 %    Sample source Arterial      SITE Left Radial      CARISA'S TEST YES      Carboxy-Hgb 0.7 (L) 1 - 2 %    Methemoglobin 0.3 0 - 1.4 %    Oxyhemoglobin 97.3 95 - 99 %    Performed by Charlette Meckel     TEMPERATURE 98.6     GLUCOSE, POC    Collection Time: 02/13/23  7:13 AM   Result Value Ref Range Glucose (POC) 156 (H) 65 - 100 mg/dL    Performed by Lori Stapillo    Collection Time: 02/13/23  7:13 AM   Result Value Ref Range    Glucose 156 mg/dL    Insulin order 3.6 units/hour    Insulin adminstered 3.6 units/hour    Multiplier 0.038     Low target 150 mg/dL    High target 200 mg/dL    D50 order 0.0 ml    D50 administered 0.00 ml    Minutes until next BG 60 min    Order initials ge     Administered initials ge    GLUCOSE, POC    Collection Time: 02/13/23  8:15 AM   Result Value Ref Range    Glucose (POC) 161 (H) 65 - 100 mg/dL    Performed by BaronGrowYo Stapillo    Collection Time: 02/13/23  8:16 AM   Result Value Ref Range    Glucose 161 mg/dL    Insulin order 3.8 units/hour    Insulin adminstered 3.8 units/hour    Multiplier 0.038     Low target 150 mg/dL    High target 200 mg/dL    D50 order 0.0 ml    D50 administered 0.00 ml    Minutes until next BG 60 min    Order initials ge     Administered initials ge    METABOLIC PANEL, BASIC    Collection Time: 02/13/23  9:02 AM   Result Value Ref Range    Sodium 138 136 - 145 mmol/L    Potassium 3.0 (L) 3.5 - 5.1 mmol/L    Chloride 114 (H) 97 - 108 mmol/L    CO2 18 (L) 21 - 32 mmol/L    Anion gap 6 5 - 15 mmol/L    Glucose 174 (H) 65 - 100 mg/dL    BUN 11 6 - 20 mg/dL    Creatinine 0.82 0.70 - 1.30 mg/dL    BUN/Creatinine ratio 13 12 - 20      eGFR >60 >60 ml/min/1.73m2    Calcium 8.2 (L) 8.5 - 10.1 mg/dL   GLUCOSE, POC    Collection Time: 02/13/23  9:18 AM   Result Value Ref Range    Glucose (POC) 163 (H) 65 - 100 mg/dL    Performed by BaronGrowYo Stapillo    Collection Time: 02/13/23  9:18 AM   Result Value Ref Range    Glucose 163 mg/dL    Insulin order 3.9 units/hour    Insulin adminstered 3.9 units/hour    Multiplier 0.038     Low target 150 mg/dL    High target 200 mg/dL    D50 order 0.0 ml    D50 administered 0.00 ml    Minutes until next BG 60 min    Order initials ge     Administered initials ge GLUCOSE, POC    Collection Time: 02/13/23 10:21 AM   Result Value Ref Range    Glucose (POC) 289 (H) 65 - 100 mg/dL    Performed by Suman Bowen    Collection Time: 02/13/23 10:21 AM   Result Value Ref Range    Glucose 289 mg/dL    Insulin order 11.0 units/hour    Insulin adminstered 11.0 units/hour    Multiplier 0.048     Low target 150 mg/dL    High target 200 mg/dL    D50 order 0.0 ml    D50 administered 0.00 ml    Minutes until next BG 60 min    Order initials ge     Administered initials ge    GLUCOSE, POC    Collection Time: 02/13/23 11:16 AM   Result Value Ref Range    Glucose (POC) 237 (H) 65 - 100 mg/dL    Performed by [facility]    Collection Time: 02/13/23 11:17 AM   Result Value Ref Range    Glucose 237 mg/dL    Insulin order 10.3 units/hour    Insulin adminstered 10.3 units/hour    Multiplier 0.058     Low target 150 mg/dL    High target 200 mg/dL    D50 order 0.0 ml    D50 administered 0.00 ml    Minutes until next BG 60 min    Order initials ge     Administered initials ge    GLUCOSE, POC    Collection Time: 02/13/23 12:15 PM   Result Value Ref Range    Glucose (POC) 181 (H) 65 - 100 mg/dL    Performed by Suman Bowen    Collection Time: 02/13/23 12:15 PM   Result Value Ref Range    Glucose 181 mg/dL    Insulin order 7.0 units/hour    Insulin adminstered 7.0 units/hour    Multiplier 0.058     Low target 150 mg/dL    High target 200 mg/dL    D50 order 0.0 ml    D50 administered 0.00 ml    Minutes until next BG 60 min    Order initials ge     Administered initials ge            _____________________________________________________________________________  Time spent in direct care including coordination of service, review of data and examination: > 35 minutes    ______________________________________________________________________________    Oseas Hickey NP    This is dictation was done by willa, computer voice recognition software. Quite often unanticipated grammatical, syntax, homophones and other interpretive errors or inadvertently transcribed by the computer software. Please excuse errors that have escaped final proofreading. Thank you.

## 2023-02-13 NOTE — H&P
History and Physical    Patient: Francisco Castañeda MRN: [id number]  SSN: [id number]    YOB: 1980  Age: 38 y.o. Sex: male      Subjective:      Francisco Castañeda is a 38 y.o. male with PMH of diabetes on insulin, hyperlipidemia presented to the ED with complain of nausea and vomiting for the past 1 week. Otherwise denies fever or chills, abdominal pain, shortness of breath, nausea/vomiting/diarrhea, or UTI symptoms. Noted home blood sugar being high and presented to outside ED. He is transferred here for admission. At outside facility found to have glucose greater than 500 with elevated gap acidosis. Insulin ggt was initiated. Patient reports have been skipping insulin doses as he has unable to return to see his PCP and running out of insulin. In the ED, vital signs stable. Repeat . Gap closed but HCO3 12. Mild leukocytosis     Chart review: none    Past Medical History:   Diagnosis Date    Chest discomfort     Diabetes mellitus ([facility] [address] 12/16/2019    Type II    Diabetic neuropathy ([facility])     feet    Hyperlipidemia associated with type 2 diabetes mellitus ([facility] [address]      Family History   Problem Relation Age of Onset    No Known Problems Mother     No Known Problems Father     Diabetes Maternal Grandmother     Diabetes Paternal Grandmother     Hypertension Paternal Grandmother      Social History     Tobacco Use    Smoking status: Never    Smokeless tobacco: Never   Substance Use Topics    Alcohol use: Not Currently        Objective:     Physical Exam:   General: alert, cooperative, no distress  Eye: conjunctivae/corneas clear. PERRL, EOM's intact. Throat and Neck: normal and no erythema or exudates noted. No mass   Lung: clear to auscultation bilaterally  Heart: regular rate and rhythm,   Abdomen: soft, non-tender. Bowel sounds normal. No masses,  Extremities: No LE edema. able to move all extremities normal, atraumatic  Skin: Normal.  Neurologic: AOx3.  Motor function and sensation grossly intact. Psychiatric: non focal    Most recent lab work and imaging results reviewed in EMR. Assessment and plan:   # DKA  - IV Insulin ggt per protocol  - Fluid: D5 NS.   - BNP q4h and POC glucose every hour.   - Potassium 4  - Possible infection: none  - NPO for now. Consider advance diet after BS controlled. # Diabetes  - Hold home medications for now. Restart home dose lantus 30u in AM after HCO3 >18. - Check HbA1c  - Patient not taking gabapentin due to side effects. # Social Determents of health: None    # Full code by default, need further clarification    # Medication reconciliation: Medication list reviewed on Epic and with patient/family.      Signed By: Domonique Espinoza MD     February 12, 2023

## 2023-02-13 NOTE — ED PROVIDER NOTES
[facility] EMERGENCY DEPT  EMERGENCY DEPARTMENT HISTORY AND PHYSICAL EXAM      Date: 2/12/2023  Patient Name: Murphy Peres MRN: [id number]  YOB: 1980  Date of evaluation: 2/12/2023  Provider: Clarissa Frank DO   Note Started: 10:46 PM 2/12/23    HISTORY OF PRESENT ILLNESS     Chief Complaint   Patient presents with    High Blood Sugar       History Provided By: Patient    HPI: Murphy Peres, 38 y.o. male with past medical history significant for diabetes presented to the emergency department as transfer from outside facility for DKA. Patient reports he has not been taking insulin as prescribed. At outside facility found to have glucose greater than 500 with associated gap acidosis. Was initiated on insulin drip and transported to this hospital for admission    PAST MEDICAL HISTORY   Past Medical History:  Past Medical History:   Diagnosis Date    Chest discomfort     Diabetes mellitus ([address] 12/16/2019    Type II    Diabetic neuropathy ([address]     feet    Hyperlipidemia associated with type 2 diabetes mellitus ([address]        Past Surgical History:  Past Surgical History:   Procedure Laterality Date    HX APPENDECTOMY         Family History:  Family History   Problem Relation Age of Onset    No Known Problems Mother     No Known Problems Father     Diabetes Maternal Grandmother     Diabetes Paternal Grandmother     Hypertension Paternal Grandmother        Social History:  Social History     Tobacco Use    Smoking status: Never    Smokeless tobacco: Never   Vaping Use    Vaping Use: Never used   Substance Use Topics    Alcohol use: Not Currently    Drug use: Yes     Types: Marijuana     Comment: every other day       Allergies: Allergies   Allergen Reactions    Kiwi Hives       PCP: Leonor Corado NP    Current Meds:   Previous Medications    DULOXETINE (CYMBALTA) 30 MG CAPSULE    Take 30 mg by mouth daily.     INSULIN GLARGINE (LANTUS,BASAGLAR) 100 UNIT/ML (3 ML) INPN    40 Units by SubCUTAneous route daily. Inject 30 units every morning    METFORMIN (GLUCOPHAGE) 1,000 MG TABLET    Take 1 tablet by mouth twice daily  Indications: type 2 diabetes mellitus    ONETOUCH DELICA PLUS LANCET 30 GAUGE MISC    USE TO CHECK GLUCOSE TWICE DAILY    PREGABALIN (LYRICA) 100 MG CAPSULE    Take 100 mg by mouth two (2) times a day. ROSUVASTATIN (CRESTOR) 10 MG TABLET    Take 1 Tablet by mouth nightly. Indications: high cholesterol and high triglycerides       REVIEW OF SYSTEMS   Review of Systems   Constitutional:  Positive for fatigue. Negative for chills and fever. HENT:  Negative for congestion and rhinorrhea. Respiratory:  Negative for cough and shortness of breath. Cardiovascular:  Negative for chest pain and palpitations. Gastrointestinal:  Negative for abdominal pain, diarrhea, nausea and vomiting. Endocrine: Positive for polyuria. Genitourinary:  Negative for difficulty urinating and dysuria. Musculoskeletal:  Negative for arthralgias and myalgias. Skin:  Negative for color change and rash. Neurological:  Negative for weakness and headaches. Positives and Pertinent negatives as per HPI. PHYSICAL EXAM     ED Triage Vitals [02/12/23 1956]   ED Encounter Vitals Group      /76      Pulse (Heart Rate) (!) 106      Resp Rate 13      Temp 97.9 °F (36.6 °C)      Temp src       O2 Sat (%) 100 %      Weight 158 lb      Height 5' 7\"      Physical Exam  Constitutional:       General: He is not in acute distress. Appearance: Normal appearance. He is not ill-appearing. HENT:      Head: Normocephalic and atraumatic. Right Ear: External ear normal.      Left Ear: External ear normal.      Nose: Nose normal.      Mouth/Throat:      Mouth: Mucous membranes are dry. Eyes:      Extraocular Movements: Extraocular movements intact. Conjunctiva/sclera: Conjunctivae normal.      Pupils: Pupils are equal, round, and reactive to light. Cardiovascular:      Rate and Rhythm: Regular rhythm. Tachycardia present. Pulses: Normal pulses. Pulmonary:      Effort: Pulmonary effort is normal. No respiratory distress. Breath sounds: Normal breath sounds. Abdominal:      General: Abdomen is flat. There is no distension. Musculoskeletal:         General: Normal range of motion. Cervical back: Normal range of motion. Skin:     General: Skin is warm and dry. Neurological:      General: No focal deficit present. Mental Status: He is alert and oriented to person, place, and time. Psychiatric:         Mood and Affect: Mood normal.         Behavior: Behavior normal.         Thought Content:  Thought content normal.         Judgment: Judgment normal.       SCREENINGS               No data recorded      LAB, EKG AND DIAGNOSTIC RESULTS   Labs:  Recent Results (from the past 12 hour(s))   EKG, 12 LEAD, INITIAL    Collection Time: 02/12/23  4:27 PM   Result Value Ref Range    Ventricular Rate 106 BPM    Atrial Rate 106 BPM    P-R Interval 140 ms    QRS Duration 93 ms    Q-T Interval 368 ms    QTC Calculation (Bezet) 489 ms    Calculated P Axis 57 degrees    Calculated R Axis -16 degrees    Calculated T Axis 43 degrees    Diagnosis       Sinus tachycardia  Borderline left axis deviation  ST elev, probable normal early repol pattern  Borderline prolonged QT interval  Baseline wander in lead(s) I,II,III,aVR,aVL     GLUCOSE, POC    Collection Time: 02/12/23  4:33 PM   Result Value Ref Range    Glucose (POC) 529 (H) 65 - 100 mg/dL    Performed by JOEY NOVAK    CBC WITH AUTOMATED DIFF    Collection Time: 02/12/23  4:39 PM   Result Value Ref Range    WBC 11.5 (H) 4.1 - 11.1 K/uL    RBC 5.36 4.10 - 5.70 M/uL    HGB 16.0 12.1 - 17.0 g/dL    HCT 45.9 36.6 - 50.3 %    MCV 85.6 80.0 - 99.0 FL    MCH 29.9 26.0 - 34.0 PG    MCHC 34.9 30.0 - 36.5 g/dL    RDW 12.7 11.5 - 14.5 %    PLATELET 296 689 - 781 K/uL    MPV 10.8 8.9 - 12.9 FL    NRBC 0.0 0.0  WBC    ABSOLUTE NRBC 0.00 0.00 - 0.01 K/uL NEUTROPHILS 76 (H) 32 - 75 %    LYMPHOCYTES 18 12 - 49 %    MONOCYTES 3 (L) 5 - 13 %    EOSINOPHILS 2 0 - 7 %    BASOPHILS 0 0 - 1 %    IMMATURE GRANULOCYTES 1 (H) 0 - 0.5 %    ABS. NEUTROPHILS 8.6 (H) 1.8 - 8.0 K/UL    ABS. LYMPHOCYTES 2.1 0.8 - 3.5 K/UL    ABS. MONOCYTES 0.4 0.0 - 1.0 K/UL    ABS. EOSINOPHILS 0.3 0.0 - 0.4 K/UL    ABS. BASOPHILS 0.0 0.0 - 0.1 K/UL    ABS. IMM. GRANS. 0.1 (H) 0.00 - 0.04 K/UL    DF AUTOMATED     METABOLIC PANEL, COMPREHENSIVE    Collection Time: 02/12/23  4:39 PM   Result Value Ref Range    Sodium 129 (L) 136 - 145 mmol/L    Potassium 4.6 3.5 - 5.1 mmol/L    Chloride 96 (L) 97 - 108 mmol/L    CO2 8 (LL) 21 - 32 mmol/L    Anion gap 25 (H) 5 - 15 mmol/L    Glucose 486 (H) 65 - 100 mg/dL    BUN 13 6 - 20 mg/dL    Creatinine 1.31 (H) 0.70 - 1.30 mg/dL    BUN/Creatinine ratio 10 (L) 12 - 20      eGFR >60 >60 ml/min/1.73m2    Calcium 9.4 8.5 - 10.1 mg/dL    Bilirubin, total 0.5 0.2 - 1.0 mg/dL    AST (SGOT) 9 (L) 15 - 37 U/L    ALT (SGPT) 19 12 - 78 U/L    Alk.  phosphatase 177 (H) 45 - 117 U/L    Protein, total 8.8 (H) 6.4 - 8.2 g/dL    Albumin 4.1 3.5 - 5.0 g/dL    Globulin 4.7 (H) 2.0 - 4.0 g/dL    A-G Ratio 0.9 (L) 1.1 - 2.2     TROPONIN-HIGH SENSITIVITY    Collection Time: 02/12/23  4:39 PM   Result Value Ref Range    Troponin-High Sensitivity 16 0 - 76 ng/L   URINALYSIS W/ RFLX MICROSCOPIC    Collection Time: 02/12/23  5:10 PM   Result Value Ref Range    Color Yellow/Straw      Appearance Clear Clear      Specific gravity >1.030 (H) 1.003 - 1.030    pH (UA) 6.0 5.0 - 8.0      Protein 100 (A) Negative mg/dL    Glucose 500 (A) Negative mg/dL    Ketone >80 (A) Negative mg/dL    Bilirubin Negative Negative      Blood Trace (A) Negative      Urobilinogen 0.2 0.2 - 1.0 EU/dL    Nitrites Negative Negative      Leukocyte Esterase Negative Negative     URINE MICROSCOPIC    Collection Time: 02/12/23  5:10 PM   Result Value Ref Range    WBC 0-4 0 - 5 /hpf    RBC 0-5 0 - 3 /hpf    Bacteria Negative Negative /hpf   GLUCOSE, POC    Collection Time: 02/12/23  5:36 PM   Result Value Ref Range    Glucose (POC) 325 (H) 65 - 100 mg/dL    Performed by Santhosh Vanegas, POC    Collection Time: 02/12/23  6:36 PM   Result Value Ref Range    Glucose (POC) 241 (H) 65 - 100 mg/dL    Performed by Melissa Gaitan    BLOOD GAS,CHEM8,LACTIC ACID POC    Collection Time: 02/12/23  8:22 PM   Result Value Ref Range    pH (POC) 7.16 (LL) 7.35 - 7.45      pCO2 (POC) 34.8 (L) 35.0 - 45.0 mmHg    pO2 (POC) 27 (LL) 75 - 100 mmHg    Sodium,  (L) 136 - 145 mmol/L    Potassium, POC 4.0 3.5 - 5.5 mmol/L    Calcium, ionized (POC) 1.31 1.12 - 1.32 mmol/L    Glucose,  (H) 65 - 100 mg/dL    Chloride,  (H) 98 - 107 MMOL/L    Creatinine, POC 0.69 0.6 - 1.3 MG/DL    eGFR (POC) >60 >60 ml/min/1.73m2    Base deficit (POC) 15.5 mmol/L    HCO3 (POC) 12.3 (L) 19.0 - 28.0 mmol/L    CO2, POC 12 MMOL/L    Sample source Venous      Performed by Jc Menjivar     Lactic Acid (POC) 0.75 0.40 - 2.00 mmol/L    Respiratory Rate PENDING     FIO2, L/min PENDING     Critical value read back GEM     O2 SAT 37 %   GLUCOSE, POC    Collection Time: 02/12/23  9:17 PM   Result Value Ref Range    Glucose (POC) 158 (H) 65 - 100 mg/dL    Performed by Jennifer Anderson    Collection Time: 02/12/23  9:25 PM   Result Value Ref Range    Glucose 158 mg/dL    Insulin order 2.0 units/hour    Insulin adminstered 2.0 units/hour    Multiplier 0.020     Low target 150 mg/dL    High target 200 mg/dL    D50 order 0.0 ml    D50 administered 0.00 ml    Minutes until next BG 60 min    Order initials mgh     Administered initials mgh    GLUCOSE, POC    Collection Time: 02/12/23 10:31 PM   Result Value Ref Range    Glucose (POC) 211 (H) 65 - 100 mg/dL    Performed by GISELA RUVALCABA    GLUCOSTABILIZER    Collection Time: 02/12/23 10:32 PM   Result Value Ref Range    Glucose 211 mg/dL    Insulin order 4.5 units/hour    Insulin adminstered 4.5 units/hour    Multiplier 0.030     Low target 150 mg/dL    High target 200 mg/dL    D50 order 0.0 ml    D50 administered 0.00 ml    Minutes until next BG 60 min    Order initials . mgh     Administered initials mgh        EKG: Initial EKG interpreted by me. Shows     Radiologic Studies:  Non-plain film images such as CT, Ultrasound and MRI are read by the radiologist. Plain radiographic images are visualized and preliminarily interpreted by the ED Provider with the below findings:    *    Interpretation per the Radiologist below, if available at the time of this note:  No results found. PROCEDURES   Unless otherwise noted below, none.   Performed by: Darek Chambers, DO   Procedures      CRITICAL CARE TIME       ED COURSE and DIFFERENTIAL DIAGNOSIS/MDM   Vitals:    Vitals:    02/12/23 1956   BP: 131/76   Pulse: (!) 106   Resp: 13   Temp: 97.9 °F (36.6 °C)   SpO2: 100%   Weight: 71.7 kg (158 lb)   Height: 5' 7\" (1.702 m)        Patient was given the following medications:  Medications   dextrose 5% and 0.9% NaCl infusion (150 mL/hr IntraVENous New Bag 2/12/23 2125)   DULoxetine (CYMBALTA) capsule 30 mg (has no administration in time range)   sodium chloride (NS) flush 5-40 mL (has no administration in time range)   sodium chloride (NS) flush 5-40 mL (has no administration in time range)   acetaminophen (TYLENOL) tablet 650 mg (has no administration in time range)     Or   acetaminophen (TYLENOL) suppository 650 mg (has no administration in time range)   polyethylene glycol (MIRALAX) packet 17 g (has no administration in time range)   ondansetron (ZOFRAN ODT) tablet 4 mg (has no administration in time range)     Or   ondansetron (ZOFRAN) injection 4 mg (has no administration in time range)   enoxaparin (LOVENOX) injection 40 mg (has no administration in time range)   insulin regular (NOVOLIN R, HUMULIN R) 100 Units in 0.9% sodium chloride 100 mL infusion (4.5 Units/hr IntraVENous Rate Change 2/12/23 2234)   insulin lispro (HUMALOG) injection (has no administration in time range)   glucose chewable tablet 16 g (has no administration in time range)   glucagon (GLUCAGEN) injection 1 mg (has no administration in time range)   dextrose 10% infusion 0-250 mL (has no administration in time range)       CONSULTS: (Who and What was discussed)  None     Chronic Conditions: Diabetes  Social Determinants affecting Dx or Tx: None  Counseling:      Records Reviewed (source and summary of external notes): Prior medical records, Previous Laboratory studies, and Nursing notes    CC/HPI Summary, DDx, ED Course, and Reassessment: 5year-old male with past medical history significant for diabetes presenting to the emergency department as transfer from outside facility for evaluation of DKA. Found to have sugar than 500 at outside facility with associated gap acidosis. Patient was initiated on IV insulin and transported to this ED. Patient arrives with IV insulin. No IV fluids running. Initial glucose 167. Will initiate D5NS. Gap closed x1 with persistent acidosis. Patient signed out to admitting physician for further evaluation in the hospital.         Disposition Considerations (Tests not done, Shared Decision Making, Pt Expectation of Test or Treatment.):      FINAL IMPRESSION     1. Diabetic ketoacidosis without coma associated with type 2 diabetes mellitus ([facility] [address]          DISPOSITION/PLAN   Admitted    Admit Note: Pt is being admitted by Samantha Patel. The results of their tests and reason(s) for their admission have been discussed with pt and/or available family. They convey agreement and understanding for the need to be admitted and for the admission diagnosis.      PATIENT REFERRED TO:  Follow-up Information    None           DISCHARGE MEDICATIONS:  Current Discharge Medication List            DISCONTINUED MEDICATIONS:  Current Discharge Medication List          I am the Primary Clinician of Record: Mya Kingsley DO (electronically signed)    (Please note that parts of this dictation were completed with voice recognition software. Quite often unanticipated grammatical, syntax, homophones, and other interpretive errors are inadvertently transcribed by the computer software. Please disregards these errors.  Please excuse any errors that have escaped final proofreading.)

## 2023-02-14 LAB
ALBUMIN SERPL-MCNC: 2.9 G/DL (ref 3.5–5)
ALBUMIN/GLOB SERPL: 1 (ref 1.1–2.2)
ALP SERPL-CCNC: 94 U/L (ref 45–117)
ALT SERPL-CCNC: 11 U/L (ref 12–78)
ANION GAP SERPL CALC-SCNC: 7 MMOL/L (ref 5–15)
AST SERPL W P-5'-P-CCNC: 9 U/L (ref 15–37)
BASOPHILS # BLD: 0 K/UL (ref 0–0.1)
BASOPHILS NFR BLD: 1 % (ref 0–1)
BILIRUB SERPL-MCNC: 0.8 MG/DL (ref 0.2–1)
BUN SERPL-MCNC: 4 MG/DL (ref 6–20)
BUN/CREAT SERPL: 9 (ref 12–20)
CA-I BLD-MCNC: 8.4 MG/DL (ref 8.5–10.1)
CHLORIDE SERPL-SCNC: 109 MMOL/L (ref 97–108)
CO2 SERPL-SCNC: 21 MMOL/L (ref 21–32)
CREAT SERPL-MCNC: 0.44 MG/DL (ref 0.7–1.3)
DIFFERENTIAL METHOD BLD: ABNORMAL
EOSINOPHIL # BLD: 0.5 K/UL (ref 0–0.4)
EOSINOPHIL NFR BLD: 10 % (ref 0–7)
ERYTHROCYTE [DISTWIDTH] IN BLOOD BY AUTOMATED COUNT: 12.8 % (ref 11.5–14.5)
GLOBULIN SER CALC-MCNC: 2.8 G/DL (ref 2–4)
GLUCOSE BLD STRIP.AUTO-MCNC: 248 MG/DL (ref 65–100)
GLUCOSE BLD STRIP.AUTO-MCNC: 261 MG/DL (ref 65–100)
GLUCOSE BLD STRIP.AUTO-MCNC: 261 MG/DL (ref 65–100)
GLUCOSE BLD STRIP.AUTO-MCNC: 287 MG/DL (ref 65–100)
GLUCOSE BLD STRIP.AUTO-MCNC: 289 MG/DL (ref 65–100)
GLUCOSE BLD STRIP.AUTO-MCNC: 308 MG/DL (ref 65–100)
GLUCOSE SERPL-MCNC: 306 MG/DL (ref 65–100)
HCT VFR BLD AUTO: 33.5 % (ref 36.6–50.3)
HGB BLD-MCNC: 11.8 G/DL (ref 12.1–17)
IMM GRANULOCYTES # BLD AUTO: 0 K/UL (ref 0–0.04)
IMM GRANULOCYTES NFR BLD AUTO: 0 % (ref 0–0.5)
LYMPHOCYTES # BLD: 2 K/UL (ref 0.8–3.5)
LYMPHOCYTES NFR BLD: 38 % (ref 12–49)
MCH RBC QN AUTO: 29.1 PG (ref 26–34)
MCHC RBC AUTO-ENTMCNC: 35.2 G/DL (ref 30–36.5)
MCV RBC AUTO: 82.5 FL (ref 80–99)
MONOCYTES # BLD: 0.2 K/UL (ref 0–1)
MONOCYTES NFR BLD: 5 % (ref 5–13)
NEUTS SEG # BLD: 2.4 K/UL (ref 1.8–8)
NEUTS SEG NFR BLD: 46 % (ref 32–75)
NRBC # BLD: 0 K/UL (ref 0–0.01)
NRBC BLD-RTO: 0 PER 100 WBC
PERFORMED BY, TECHID: ABNORMAL
PLATELET # BLD AUTO: 153 K/UL (ref 150–400)
PMV BLD AUTO: 10.9 FL (ref 8.9–12.9)
POTASSIUM SERPL-SCNC: 3.6 MMOL/L (ref 3.5–5.1)
PROT SERPL-MCNC: 5.7 G/DL (ref 6.4–8.2)
RBC # BLD AUTO: 4.06 M/UL (ref 4.1–5.7)
SODIUM SERPL-SCNC: 137 MMOL/L (ref 136–145)
WBC # BLD AUTO: 5.2 K/UL (ref 4.1–11.1)

## 2023-02-14 PROCEDURE — 74011250637 HC RX REV CODE- 250/637: Performed by: INTERNAL MEDICINE

## 2023-02-14 PROCEDURE — 74011636637 HC RX REV CODE- 636/637: Performed by: NURSE PRACTITIONER

## 2023-02-14 PROCEDURE — 82962 GLUCOSE BLOOD TEST: CPT

## 2023-02-14 PROCEDURE — 74011000250 HC RX REV CODE- 250: Performed by: INTERNAL MEDICINE

## 2023-02-14 PROCEDURE — 85025 COMPLETE CBC W/AUTO DIFF WBC: CPT

## 2023-02-14 PROCEDURE — 80053 COMPREHEN METABOLIC PANEL: CPT

## 2023-02-14 PROCEDURE — 74011250636 HC RX REV CODE- 250/636: Performed by: INTERNAL MEDICINE

## 2023-02-14 PROCEDURE — 36415 COLL VENOUS BLD VENIPUNCTURE: CPT

## 2023-02-14 PROCEDURE — 65270000029 HC RM PRIVATE

## 2023-02-14 RX ORDER — DEXTROSE MONOHYDRATE 100 MG/ML
0-250 INJECTION, SOLUTION INTRAVENOUS AS NEEDED
Status: DISCONTINUED | OUTPATIENT
Start: 2023-02-14 | End: 2023-02-15 | Stop reason: HOSPADM

## 2023-02-14 RX ORDER — INSULIN GLARGINE 100 [IU]/ML
30 INJECTION, SOLUTION SUBCUTANEOUS DAILY
Status: DISCONTINUED | OUTPATIENT
Start: 2023-02-14 | End: 2023-02-14

## 2023-02-14 RX ORDER — INSULIN LISPRO 100 [IU]/ML
INJECTION, SOLUTION INTRAVENOUS; SUBCUTANEOUS
Status: DISCONTINUED | OUTPATIENT
Start: 2023-02-14 | End: 2023-02-15

## 2023-02-14 RX ORDER — INSULIN GLARGINE 100 [IU]/ML
40 INJECTION, SOLUTION SUBCUTANEOUS DAILY
Status: DISCONTINUED | OUTPATIENT
Start: 2023-02-15 | End: 2023-02-15

## 2023-02-14 RX ORDER — IBUPROFEN 200 MG
4 TABLET ORAL AS NEEDED
Status: DISCONTINUED | OUTPATIENT
Start: 2023-02-14 | End: 2023-02-15

## 2023-02-14 RX ORDER — INSULIN LISPRO 100 [IU]/ML
INJECTION, SOLUTION INTRAVENOUS; SUBCUTANEOUS
Status: CANCELLED | OUTPATIENT
Start: 2023-02-14

## 2023-02-14 RX ADMIN — DULOXETINE HYDROCHLORIDE 30 MG: 30 CAPSULE, DELAYED RELEASE ORAL at 08:06

## 2023-02-14 RX ADMIN — INSULIN LISPRO 2 UNITS: 100 INJECTION, SOLUTION INTRAVENOUS; SUBCUTANEOUS at 21:59

## 2023-02-14 RX ADMIN — SODIUM CHLORIDE, PRESERVATIVE FREE 10 ML: 5 INJECTION INTRAVENOUS at 05:01

## 2023-02-14 RX ADMIN — SODIUM CHLORIDE, PRESERVATIVE FREE 10 ML: 5 INJECTION INTRAVENOUS at 13:22

## 2023-02-14 RX ADMIN — POTASSIUM CHLORIDE, DEXTROSE MONOHYDRATE AND SODIUM CHLORIDE: 300; 5; 900 INJECTION, SOLUTION INTRAVENOUS at 04:58

## 2023-02-14 RX ADMIN — INSULIN HUMAN 5 UNITS: 100 INJECTION, SUSPENSION SUBCUTANEOUS at 16:52

## 2023-02-14 RX ADMIN — SODIUM CHLORIDE, PRESERVATIVE FREE 10 ML: 5 INJECTION INTRAVENOUS at 22:02

## 2023-02-14 RX ADMIN — INSULIN LISPRO 3 UNITS: 100 INJECTION, SOLUTION INTRAVENOUS; SUBCUTANEOUS at 16:52

## 2023-02-14 RX ADMIN — SODIUM CHLORIDE, PRESERVATIVE FREE 10 ML: 5 INJECTION INTRAVENOUS at 22:03

## 2023-02-14 RX ADMIN — INSULIN GLARGINE 30 UNITS: 100 INJECTION, SOLUTION SUBCUTANEOUS at 11:33

## 2023-02-14 RX ADMIN — ACETAMINOPHEN 650 MG: 325 TABLET ORAL at 21:59

## 2023-02-14 RX ADMIN — INSULIN LISPRO 3 UNITS: 100 INJECTION, SOLUTION INTRAVENOUS; SUBCUTANEOUS at 11:33

## 2023-02-14 RX ADMIN — ENOXAPARIN SODIUM 40 MG: 100 INJECTION SUBCUTANEOUS at 08:06

## 2023-02-14 NOTE — ED NOTES
Potassium Chloride in dextrose paused while insulin drip is stopped. Rafael Ac NP notified and made aware.

## 2023-02-14 NOTE — PROGRESS NOTES
Problem: Falls - Risk of  Goal: *Absence of Falls  Description: Document Joy Dye Fall Risk and appropriate interventions in the flowsheet.   Outcome: Progressing Towards Goal  Note: Fall Risk Interventions:

## 2023-02-14 NOTE — PROGRESS NOTES
Problem: Falls - Risk of  Goal: *Absence of Falls  Description: Document Austin Amin Fall Risk and appropriate interventions in the flowsheet.   Outcome: Progressing Towards Goal  Note: Fall Risk Interventions:

## 2023-02-14 NOTE — PROGRESS NOTES
Problem: Diabetes Self-Management  Goal: *Disease process and treatment process  Description: Define diabetes and identify own type of diabetes; list 3 options for treating diabetes. Outcome: Progressing Towards Goal  Goal: *Using medications safely  Description: State effect of diabetes medications on diabetes; name diabetes medication taking, action and side effects.   Outcome: Progressing Towards Goal     Problem: Patient Education: Go to Patient Education Activity  Goal: Patient/Family Education  Outcome: Progressing Towards Goal     Problem: Patient Education: Go to Patient Education Activity  Goal: Patient/Family Education  Outcome: Progressing Towards Goal     Problem: DKA: Day 1  Goal: Off Pathway (Use only if patient is Off Pathway)  Outcome: Resolved/Met     Problem: DKA: Day 2  Goal: Off Pathway (Use only if patient is Off Pathway)  Outcome: Resolved/Met

## 2023-02-14 NOTE — PROGRESS NOTES
Call to [facility], RN, at [phone] to report that admission navigator is complete. Meeta Zhang would like more education about controlling his blood sugar, so [facility] will put in a consult for dietary.

## 2023-02-14 NOTE — ED NOTES
TRANSFER - OUT REPORT:    Tubed report to Becky Bear RN on Ricardo Valenzuela.  being transferred to [facility] for routine progression of care       Report consisted of patients Situation, Background, Assessment and   Recommendations(SBAR). Information from the following report(s) SBAR, ED Summary, MAR, and Recent Results was reviewed with the receiving nurse. Lines:   Peripheral IV 02/12/23 Left Antecubital (Active)       Peripheral IV Right Antecubital (Active)   Site Assessment Clean, dry, & intact 02/13/23 0559   Phlebitis Assessment 0 02/13/23 0559   Infiltration Assessment 0 02/13/23 0559   Dressing Status Clean, dry, & intact 02/13/23 0559       Peripheral IV 02/13/23 Anterior;Right Forearm (Active)        Opportunity for questions and clarification was provided.       Patient transported with:   Registered Nurse  Tech

## 2023-02-14 NOTE — PROGRESS NOTES
Hospitalist Progress Note            Daily Progress Note: 2/14/2023 12:27 PM  Hospital course:   Tanisha Dillard. is a 5year old  male with PMH of diabetes on insulin,neuropathy,  hyperlipidemia presented to the ED with complaints of nausea and vomiting for the past 1 week. Otherwise denies fever or chills, abdominal pain, shortness of breath, nausea/vomiting/diarrhea, or UTI symptoms. Noted home blood sugar being high and presented to outside ED. He is transferred here for admission. At outside facility found to have glucose greater than 500 with elevated gap acidosis. Insulin ggt was initiated. Patient reports he has been skipping insulin doses as he has been unable to get his prescription refilled. In the ED, vital signs stable. Repeat . Gap closed but HCO3 12. Mild leukocytosis  Blood glucose improving. Subjective:     Mr. Tara Casanova seen on the medical floor. Family at the bedside. His blood glucose high but improved from admission. Denies headache, blurred vision, dizziness, or light headedness, denies frequent urination or increased thirst. Reports \"feeling better today than yesterday. \"  Assessment/Plan:   Active Problems:    DKA (diabetic ketoacidosis) ([facility] [address] (2/12/2023)    DKA ( Resolved)/ Type 2 Diabetes  -Lantus 40 units daily  -NPH 5 units twice daily with meals  -Sliding scale coverage  - Possible infection none  -diabetic diet  -Nutritionist consult    Hypokalemia (Improved)  -Potassium 40 meq x 1 oral on 2/13  -CMP in the am    Social Determents of Health: None          DVT Prophylaxis: Lovenox  Code Status: Full Code  POA/NOK:    Disposition and discharge barriers:   .  Improved blood glucose  Care Plan discussed with:     Current Facility-Administered Medications   Medication Dose Route Frequency    insulin lispro (HUMALOG) injection   SubCUTAneous AC&HS    glucose chewable tablet 16 g  4 Tablet Oral PRN    glucagon (GLUCAGEN) injection 1 mg  1 mg IntraMUSCular PRN    dextrose 10% infusion 0-250 mL  0-250 mL IntraVENous PRN    insulin glargine (LANTUS) injection 30 Units  30 Units SubCUTAneous DAILY    DULoxetine (CYMBALTA) capsule 30 mg  30 mg Oral DAILY    sodium chloride (NS) flush 5-40 mL  5-40 mL IntraVENous Q8H    sodium chloride (NS) flush 5-40 mL  5-40 mL IntraVENous PRN    acetaminophen (TYLENOL) tablet 650 mg  650 mg Oral Q6H PRN    Or    acetaminophen (TYLENOL) suppository 650 mg  650 mg Rectal Q6H PRN    polyethylene glycol (MIRALAX) packet 17 g  17 g Oral DAILY PRN    ondansetron (ZOFRAN ODT) tablet 4 mg  4 mg Oral Q8H PRN    Or    ondansetron (ZOFRAN) injection 4 mg  4 mg IntraVENous Q6H PRN    enoxaparin (LOVENOX) injection 40 mg  40 mg SubCUTAneous DAILY        REVIEW OF SYSTEMS    Review of Systems   Constitutional:  Negative for chills and fever. HENT:  Negative for hearing loss. Eyes:  Negative for blurred vision. Respiratory:  Negative for cough, shortness of breath and wheezing. Cardiovascular:  Negative for chest pain and leg swelling. Gastrointestinal:  Negative for abdominal pain, heartburn, nausea and vomiting. Genitourinary: Negative. Skin:  Negative for rash. Neurological:  Negative for dizziness and headaches. Objective:     Visit Vitals  /75 (BP 1 Location: Left upper arm, BP Patient Position: At rest;Semi fowlers)   Pulse 79   Temp 97.8 °F (36.6 °C)   Resp 18   Ht 5' 7\" (1.702 m)   Wt 71.7 kg (158 lb)   SpO2 98%   BMI 24.75 kg/m²      O2 Device: None (Room air)    Temp (24hrs), Av.9 °F (36.6 °C), Min:97.7 °F (36.5 °C), Max:98.1 °F (36.7 °C)        PHYSICAL EXAM:    Physical Exam  Constitutional:       Appearance: He is ill-appearing. HENT:      Head: Normocephalic and atraumatic. Cardiovascular:      Rate and Rhythm: Regular rhythm. Tachycardia present. Heart sounds: No murmur heard. No friction rub. No gallop. Pulmonary:      Effort: Pulmonary effort is normal.      Breath sounds: No wheezing, rhonchi or rales. Abdominal:      General: Bowel sounds are normal. There is no distension. Palpations: Abdomen is soft. Tenderness: There is no abdominal tenderness. Musculoskeletal:      Right lower leg: No edema. Left lower leg: No edema. Skin:     General: Skin is warm and dry. Coloration: Skin is pale. Neurological:      Mental Status: He is alert and oriented to person, place, and time.    Psychiatric:         Mood and Affect: Mood normal.         Behavior: Behavior normal.        Data Review    Recent Results (from the past 24 hour(s))   GLUCOSE, POC    Collection Time: 02/13/23  1:21 PM   Result Value Ref Range    Glucose (POC) 202 (H) 65 - 100 mg/dL    Performed by Enrique Milton    Collection Time: 02/13/23  1:21 PM   Result Value Ref Range    Glucose 202 mg/dL    Insulin order 9.7 units/hour    Insulin adminstered 9.7 units/hour    Multiplier 0.068     Low target 150 mg/dL    High target 200 mg/dL    D50 order 0.0 ml    D50 administered 0.00 ml    Minutes until next BG 60 min    Order initials ge     Administered initials ge    GLUCOSE, POC    Collection Time: 02/13/23  2:32 PM   Result Value Ref Range    Glucose (POC) 187 (H) 65 - 100 mg/dL    Performed by Enrique Milton    Collection Time: 02/13/23  2:33 PM   Result Value Ref Range    Glucose 187 mg/dL    Insulin order 8.6 units/hour    Insulin adminstered 8.6 units/hour    Multiplier 0.068     Low target 150 mg/dL    High target 200 mg/dL    D50 order 0.0 ml    D50 administered 0.00 ml    Minutes until next BG 60 min    Order initials ge     Administered initials ge    METABOLIC PANEL, BASIC    Collection Time: 02/13/23  2:34 PM   Result Value Ref Range    Sodium 138 136 - 145 mmol/L    Potassium 3.2 (L) 3.5 - 5.1 mmol/L    Chloride 111 (H) 97 - 108 mmol/L    CO2 21 21 - 32 mmol/L    Anion gap 6 5 - 15 mmol/L    Glucose 184 (H) 65 - 100 mg/dL    BUN 8 6 - 20 mg/dL    Creatinine 0.68 (L) 0.70 - 1.30 mg/dL    BUN/Creatinine ratio 12 12 - 20      eGFR >60 >60 ml/min/1.73m2    Calcium 8.2 (L) 8.5 - 10.1 mg/dL   GLUCOSE, POC    Collection Time: 02/13/23  3:46 PM   Result Value Ref Range    Glucose (POC) 156 (H) 65 - 100 mg/dL    Performed by Clearance Bullion    Collection Time: 02/13/23  3:46 PM   Result Value Ref Range    Glucose 156 mg/dL    Insulin order 6.5 units/hour    Insulin adminstered 6.5 units/hour    Multiplier 0.068     Low target 150 mg/dL    High target 200 mg/dL    D50 order 0.0 ml    D50 administered 0.00 ml    Minutes until next BG 60 min    Order initials HD     Administered initials HD    GLUCOSE, POC    Collection Time: 02/13/23  4:54 PM   Result Value Ref Range    Glucose (POC) 144 (H) 65 - 100 mg/dL    Performed by Clearance Bullion    Collection Time: 02/13/23  4:55 PM   Result Value Ref Range    Glucose 144 mg/dL    Insulin order 4.5 units/hour    Insulin adminstered 4.5 units/hour    Multiplier 0.054     Low target 150 mg/dL    High target 200 mg/dL    D50 order 0.0 ml    D50 administered 0.00 ml    Minutes until next BG 60 min    Order initials HD     Administered initials HD    GLUCOSE, POC    Collection Time: 02/13/23  6:01 PM   Result Value Ref Range    Glucose (POC) 124 (H) 65 - 100 mg/dL    Performed by Juice Ramos  PCT    GLUCOSTABILIZER    Collection Time: 02/13/23  6:11 PM   Result Value Ref Range    Glucose 124 mg/dL    Insulin order 2.8 units/hour    Insulin adminstered 2.8 units/hour    Multiplier 0.043     Low target 150 mg/dL    High target 200 mg/dL    D50 order 0.0 ml    D50 administered 0.00 ml    Minutes until next BG 60 min    Order initials HD     Administered initials HD    METABOLIC PANEL, BASIC    Collection Time: 02/13/23  6:28 PM   Result Value Ref Range    Sodium 140 136 - 145 mmol/L    Potassium 3.3 (L) 3.5 - 5.1 mmol/L    Chloride 112 (H) 97 - 108 mmol/L    CO2 23 21 - 32 mmol/L    Anion gap 5 5 - 15 mmol/L    Glucose 121 (H) 65 - 100 mg/dL    BUN 6 6 - 20 mg/dL    Creatinine 0.50 (L) 0.70 - 1.30 mg/dL    BUN/Creatinine ratio 12 12 - 20      eGFR >60 >60 ml/min/1.73m2    Calcium 8.2 (L) 8.5 - 10.1 mg/dL   GLUCOSE, POC    Collection Time: 02/13/23  7:23 PM   Result Value Ref Range    Glucose (POC) 142 (H) 65 - 100 mg/dL    Performed by Oliva Costello    Collection Time: 02/13/23  7:24 PM   Result Value Ref Range    Glucose 142 mg/dL    Insulin order 2.7 units/hour    Insulin adminstered 2.7 units/hour    Multiplier 0.033     Low target 150 mg/dL    High target 200 mg/dL    D50 order 0.0 ml    D50 administered 0.00 ml    Minutes until next BG 60 min    Order initials HD     Administered initials HD    GLUCOSE, POC    Collection Time: 02/13/23  8:42 PM   Result Value Ref Range    Glucose (POC) 189 (H) 65 - 100 mg/dL    Performed by Fairbanks Colon    Collection Time: 02/13/23  8:45 PM   Result Value Ref Range    Glucose 189 mg/dL    Insulin order 4.3 units/hour    Insulin adminstered 4.3 units/hour    Multiplier 0.033     Low target 150 mg/dL    High target 200 mg/dL    D50 order 0.0 ml    D50 administered 0.00 ml    Minutes until next BG 60 min    Order initials HD     Administered initials HD    GLUCOSE, POC    Collection Time: 02/13/23  9:53 PM   Result Value Ref Range    Glucose (POC) 51 (LL) 65 - 100 mg/dL    Performed by Fairbanks Colon    Collection Time: 02/13/23  9:59 PM   Result Value Ref Range    Glucose 51 mg/dL    Insulin order 0.0 units/hour    Insulin adminstered 0.0 units/hour    Multiplier 0.017     Low target 150 mg/dL    High target 200 mg/dL    D50 order 20.0 ml    D50 administered 20.00 ml    Minutes until next BG 15 min    Order initials HD     Administered initials HD    METABOLIC PANEL, BASIC    Collection Time: 02/13/23 10:00 PM   Result Value Ref Range    Sodium 138 136 - 145 mmol/L    Potassium 3.3 (L) 3.5 - 5.1 mmol/L    Chloride 111 (H) 97 - 108 mmol/L    CO2 24 21 - 32 mmol/L    Anion gap 3 (L) 5 - 15 mmol/L    Glucose 202 (H) 65 - 100 mg/dL    BUN 5 (L) 6 - 20 mg/dL    Creatinine 0.58 (L) 0.70 - 1.30 mg/dL    BUN/Creatinine ratio 9 (L) 12 - 20      eGFR >60 >60 ml/min/1.73m2    Calcium 8.5 8.5 - 10.1 mg/dL   GLUCOSE, POC    Collection Time: 02/13/23 10:04 PM   Result Value Ref Range    Glucose (POC) 188 (H) 65 - 100 mg/dL    Performed by Manish Uriostegui, [address], POC    Collection Time: 02/14/23 12:11 AM   Result Value Ref Range    Glucose (POC) 287 (H) 65 - 100 mg/dL    Performed by Oc Horn    GLUCOSE, POC    Collection Time: 02/14/23  3:46 AM   Result Value Ref Range    Glucose (POC) 308 (H) 65 - 100 mg/dL    Performed by Oc Horn    CBC WITH AUTOMATED DIFF    Collection Time: 02/14/23  4:22 AM   Result Value Ref Range    WBC 5.2 4.1 - 11.1 K/uL    RBC 4.06 (L) 4.10 - 5.70 M/uL    HGB 11.8 (L) 12.1 - 17.0 g/dL    HCT 33.5 (L) 36.6 - 50.3 %    MCV 82.5 80.0 - 99.0 FL    MCH 29.1 26.0 - 34.0 PG    MCHC 35.2 30.0 - 36.5 g/dL    RDW 12.8 11.5 - 14.5 %    PLATELET 612 509 - 050 K/uL    MPV 10.9 8.9 - 12.9 FL    NRBC 0.0 0.0  WBC    ABSOLUTE NRBC 0.00 0.00 - 0.01 K/uL    NEUTROPHILS 46 32 - 75 %    LYMPHOCYTES 38 12 - 49 %    MONOCYTES 5 5 - 13 %    EOSINOPHILS 10 (H) 0 - 7 %    BASOPHILS 1 0 - 1 %    IMMATURE GRANULOCYTES 0 0 - 0.5 %    ABS. NEUTROPHILS 2.4 1.8 - 8.0 K/UL    ABS. LYMPHOCYTES 2.0 0.8 - 3.5 K/UL    ABS. MONOCYTES 0.2 0.0 - 1.0 K/UL    ABS. EOSINOPHILS 0.5 (H) 0.0 - 0.4 K/UL    ABS. BASOPHILS 0.0 0.0 - 0.1 K/UL    ABS. IMM.  GRANS. 0.0 0.00 - 0.04 K/UL    DF AUTOMATED     METABOLIC PANEL, COMPREHENSIVE    Collection Time: 02/14/23  4:22 AM   Result Value Ref Range    Sodium 137 136 - 145 mmol/L    Potassium 3.6 3.5 - 5.1 mmol/L    Chloride 109 (H) 97 - 108 mmol/L    CO2 21 21 - 32 mmol/L    Anion gap 7 5 - 15 mmol/L    Glucose 306 (H) 65 - 100 mg/dL    BUN 4 (L) 6 - 20 mg/dL    Creatinine 0.44 (L) 0.70 - 1.30 mg/dL BUN/Creatinine ratio 9 (L) 12 - 20      eGFR >60 >60 ml/min/1.73m2    Calcium 8.4 (L) 8.5 - 10.1 mg/dL    Bilirubin, total 0.8 0.2 - 1.0 mg/dL    AST (SGOT) 9 (L) 15 - 37 U/L    ALT (SGPT) 11 (L) 12 - 78 U/L    Alk. phosphatase 94 45 - 117 U/L    Protein, total 5.7 (L) 6.4 - 8.2 g/dL    Albumin 2.9 (L) 3.5 - 5.0 g/dL    Globulin 2.8 2.0 - 4.0 g/dL    A-G Ratio 1.0 (L) 1.1 - 2.2     GLUCOSE, POC    Collection Time: 02/14/23  7:58 AM   Result Value Ref Range    Glucose (POC) 261 (H) 65 - 100 mg/dL    Performed by Elva Villagran, POC    Collection Time: 02/14/23 11:25 AM   Result Value Ref Range    Glucose (POC) 289 (H) 65 - 100 mg/dL    Performed by Raimundo Jackson        No orders to display       Intake and Output:  Current Shift: No intake/output data recorded. Last three shifts: 02/12 1901 - 02/14 0700  In: 3889.8 [I.V.:3889.8]  Out: 500 [Urine:500]      Lab/Data Review:  Recent Labs     02/14/23  0422 02/13/23  0357 02/12/23  1639   WBC 5.2 7.7 11.5*   HGB 11.8* 12.4 16.0   HCT 33.5* 35.0* 45.9    188 245       Recent Labs     02/14/23  0422 02/13/23  2200 02/13/23  1828 02/12/23  2315 02/12/23  1639    138 140   < > 129*   K 3.6 3.3* 3.3*   < > 4.6   * 111* 112*   < > 96*   CO2 21 24 23   < > 8*   * 202* 121*   < > 486*   BUN 4* 5* 6   < > 13   CREA 0.44* 0.58* 0.50*   < > 1.31*   CA 8.4* 8.5 8.2*   < > 9.4   ALB 2.9*  --   --   --  4.1   TBILI 0.8  --   --   --  0.5   ALT 11*  --   --   --  19    < > = values in this interval not displayed.        Recent Labs     02/13/23  0635   PH 7.29*   PCO2 37   PO2 111*   HCO3 17*   FIO2 21       Recent Results (from the past 24 hour(s))   GLUCOSE, POC    Collection Time: 02/13/23  1:21 PM   Result Value Ref Range    Glucose (POC) 202 (H) 65 - 100 mg/dL    Performed by Sveta Blount    Collection Time: 02/13/23  1:21 PM   Result Value Ref Range    Glucose 202 mg/dL    Insulin order 9.7 units/hour    Insulin adminstered 9.7 units/hour    Multiplier 0.068     Low target 150 mg/dL    High target 200 mg/dL    D50 order 0.0 ml    D50 administered 0.00 ml    Minutes until next BG 60 min    Order initials ge     Administered initials ge    GLUCOSE, POC    Collection Time: 02/13/23  2:32 PM   Result Value Ref Range    Glucose (POC) 187 (H) 65 - 100 mg/dL    Performed by Modesta Bravo    Collection Time: 02/13/23  2:33 PM   Result Value Ref Range    Glucose 187 mg/dL    Insulin order 8.6 units/hour    Insulin adminstered 8.6 units/hour    Multiplier 0.068     Low target 150 mg/dL    High target 200 mg/dL    D50 order 0.0 ml    D50 administered 0.00 ml    Minutes until next BG 60 min    Order initials ge     Administered initials ge    METABOLIC PANEL, BASIC    Collection Time: 02/13/23  2:34 PM   Result Value Ref Range    Sodium 138 136 - 145 mmol/L    Potassium 3.2 (L) 3.5 - 5.1 mmol/L    Chloride 111 (H) 97 - 108 mmol/L    CO2 21 21 - 32 mmol/L    Anion gap 6 5 - 15 mmol/L    Glucose 184 (H) 65 - 100 mg/dL    BUN 8 6 - 20 mg/dL    Creatinine 0.68 (L) 0.70 - 1.30 mg/dL    BUN/Creatinine ratio 12 12 - 20      eGFR >60 >60 ml/min/1.73m2    Calcium 8.2 (L) 8.5 - 10.1 mg/dL   GLUCOSE, POC    Collection Time: 02/13/23  3:46 PM   Result Value Ref Range    Glucose (POC) 156 (H) 65 - 100 mg/dL    Performed by Clearance Bullion    Collection Time: 02/13/23  3:46 PM   Result Value Ref Range    Glucose 156 mg/dL    Insulin order 6.5 units/hour    Insulin adminstered 6.5 units/hour    Multiplier 0.068     Low target 150 mg/dL    High target 200 mg/dL    D50 order 0.0 ml    D50 administered 0.00 ml    Minutes until next BG 60 min    Order initials HD     Administered initials HD    GLUCOSE, POC    Collection Time: 02/13/23  4:54 PM   Result Value Ref Range    Glucose (POC) 144 (H) 65 - 100 mg/dL    Performed by Clearance Bullion    Collection Time: 02/13/23  4:55 PM   Result Value Ref Range    Glucose 144 mg/dL    Insulin order 4.5 units/hour    Insulin adminstered 4.5 units/hour    Multiplier 0.054     Low target 150 mg/dL    High target 200 mg/dL    D50 order 0.0 ml    D50 administered 0.00 ml    Minutes until next BG 60 min    Order initials HD     Administered initials HD    GLUCOSE, POC    Collection Time: 02/13/23  6:01 PM   Result Value Ref Range    Glucose (POC) 124 (H) 65 - 100 mg/dL    Performed by Doris Barnardsville  PCT    GLUCOSTABILIZER    Collection Time: 02/13/23  6:11 PM   Result Value Ref Range    Glucose 124 mg/dL    Insulin order 2.8 units/hour    Insulin adminstered 2.8 units/hour    Multiplier 0.043     Low target 150 mg/dL    High target 200 mg/dL    D50 order 0.0 ml    D50 administered 0.00 ml    Minutes until next BG 60 min    Order initials HD     Administered initials HD    METABOLIC PANEL, BASIC    Collection Time: 02/13/23  6:28 PM   Result Value Ref Range    Sodium 140 136 - 145 mmol/L    Potassium 3.3 (L) 3.5 - 5.1 mmol/L    Chloride 112 (H) 97 - 108 mmol/L    CO2 23 21 - 32 mmol/L    Anion gap 5 5 - 15 mmol/L    Glucose 121 (H) 65 - 100 mg/dL    BUN 6 6 - 20 mg/dL    Creatinine 0.50 (L) 0.70 - 1.30 mg/dL    BUN/Creatinine ratio 12 12 - 20      eGFR >60 >60 ml/min/1.73m2    Calcium 8.2 (L) 8.5 - 10.1 mg/dL   GLUCOSE, POC    Collection Time: 02/13/23  7:23 PM   Result Value Ref Range    Glucose (POC) 142 (H) 65 - 100 mg/dL    Performed by Felicity Gonzales    Collection Time: 02/13/23  7:24 PM   Result Value Ref Range    Glucose 142 mg/dL    Insulin order 2.7 units/hour    Insulin adminstered 2.7 units/hour    Multiplier 0.033     Low target 150 mg/dL    High target 200 mg/dL    D50 order 0.0 ml    D50 administered 0.00 ml    Minutes until next BG 60 min    Order initials HD     Administered initials HD    GLUCOSE, POC    Collection Time: 02/13/23  8:42 PM   Result Value Ref Range    Glucose (POC) 189 (H) 65 - 100 mg/dL    Performed by Paulette Parker Shady Phelan    Collection Time: 02/13/23  8:45 PM   Result Value Ref Range    Glucose 189 mg/dL    Insulin order 4.3 units/hour    Insulin adminstered 4.3 units/hour    Multiplier 0.033     Low target 150 mg/dL    High target 200 mg/dL    D50 order 0.0 ml    D50 administered 0.00 ml    Minutes until next BG 60 min    Order initials HD     Administered initials HD    GLUCOSE, POC    Collection Time: 02/13/23  9:53 PM   Result Value Ref Range    Glucose (POC) 51 (LL) 65 - 100 mg/dL    Performed by Syeda Willis    Collection Time: 02/13/23  9:59 PM   Result Value Ref Range    Glucose 51 mg/dL    Insulin order 0.0 units/hour    Insulin adminstered 0.0 units/hour    Multiplier 0.017     Low target 150 mg/dL    High target 200 mg/dL    D50 order 20.0 ml    D50 administered 20.00 ml    Minutes until next BG 15 min    Order initials HD     Administered initials HD    METABOLIC PANEL, BASIC    Collection Time: 02/13/23 10:00 PM   Result Value Ref Range    Sodium 138 136 - 145 mmol/L    Potassium 3.3 (L) 3.5 - 5.1 mmol/L    Chloride 111 (H) 97 - 108 mmol/L    CO2 24 21 - 32 mmol/L    Anion gap 3 (L) 5 - 15 mmol/L    Glucose 202 (H) 65 - 100 mg/dL    BUN 5 (L) 6 - 20 mg/dL    Creatinine 0.58 (L) 0.70 - 1.30 mg/dL    BUN/Creatinine ratio 9 (L) 12 - 20      eGFR >60 >60 ml/min/1.73m2    Calcium 8.5 8.5 - 10.1 mg/dL   GLUCOSE, POC    Collection Time: 02/13/23 10:04 PM   Result Value Ref Range    Glucose (POC) 188 (H) 65 - 100 mg/dL    Performed by Jones Workmane Marker [address], POC    Collection Time: 02/14/23 12:11 AM   Result Value Ref Range    Glucose (POC) 287 (H) 65 - 100 mg/dL    Performed by Moises Skaggs    GLUCOSE, POC    Collection Time: 02/14/23  3:46 AM   Result Value Ref Range    Glucose (POC) 308 (H) 65 - 100 mg/dL    Performed by Moises Skaggs    CBC WITH AUTOMATED DIFF    Collection Time: 02/14/23  4:22 AM   Result Value Ref Range    WBC 5.2 4.1 - 11.1 K/uL    RBC 4.06 (L) 4.10 - 5.70 M/uL    HGB 11.8 (L) 12.1 - 17.0 g/dL    HCT 33.5 (L) 36.6 - 50.3 %    MCV 82.5 80.0 - 99.0 FL    MCH 29.1 26.0 - 34.0 PG    MCHC 35.2 30.0 - 36.5 g/dL    RDW 12.8 11.5 - 14.5 %    PLATELET 825 303 - 716 K/uL    MPV 10.9 8.9 - 12.9 FL    NRBC 0.0 0.0  WBC    ABSOLUTE NRBC 0.00 0.00 - 0.01 K/uL    NEUTROPHILS 46 32 - 75 %    LYMPHOCYTES 38 12 - 49 %    MONOCYTES 5 5 - 13 %    EOSINOPHILS 10 (H) 0 - 7 %    BASOPHILS 1 0 - 1 %    IMMATURE GRANULOCYTES 0 0 - 0.5 %    ABS. NEUTROPHILS 2.4 1.8 - 8.0 K/UL    ABS. LYMPHOCYTES 2.0 0.8 - 3.5 K/UL    ABS. MONOCYTES 0.2 0.0 - 1.0 K/UL    ABS. EOSINOPHILS 0.5 (H) 0.0 - 0.4 K/UL    ABS. BASOPHILS 0.0 0.0 - 0.1 K/UL    ABS. IMM. GRANS. 0.0 0.00 - 0.04 K/UL    DF AUTOMATED     METABOLIC PANEL, COMPREHENSIVE    Collection Time: 02/14/23  4:22 AM   Result Value Ref Range    Sodium 137 136 - 145 mmol/L    Potassium 3.6 3.5 - 5.1 mmol/L    Chloride 109 (H) 97 - 108 mmol/L    CO2 21 21 - 32 mmol/L    Anion gap 7 5 - 15 mmol/L    Glucose 306 (H) 65 - 100 mg/dL    BUN 4 (L) 6 - 20 mg/dL    Creatinine 0.44 (L) 0.70 - 1.30 mg/dL    BUN/Creatinine ratio 9 (L) 12 - 20      eGFR >60 >60 ml/min/1.73m2    Calcium 8.4 (L) 8.5 - 10.1 mg/dL    Bilirubin, total 0.8 0.2 - 1.0 mg/dL    AST (SGOT) 9 (L) 15 - 37 U/L    ALT (SGPT) 11 (L) 12 - 78 U/L    Alk.  phosphatase 94 45 - 117 U/L    Protein, total 5.7 (L) 6.4 - 8.2 g/dL    Albumin 2.9 (L) 3.5 - 5.0 g/dL    Globulin 2.8 2.0 - 4.0 g/dL    A-G Ratio 1.0 (L) 1.1 - 2.2     GLUCOSE, POC    Collection Time: 02/14/23  7:58 AM   Result Value Ref Range    Glucose (POC) 261 (H) 65 - 100 mg/dL    Performed by Thea Rosa, POC    Collection Time: 02/14/23 11:25 AM   Result Value Ref Range    Glucose (POC) 289 (H) 65 - 100 mg/dL    Performed by Lisa Silverman              _____________________________________________________________________________  Time spent in direct care including coordination of service, review of data and examination: > 35 minutes    ______________________________________________________________________________    Eli Herrera NP    This is dictation was done by dragon, computer voice recognition software. Quite often unanticipated grammatical, syntax, homophones and other interpretive errors or inadvertently transcribed by the computer software. Please excuse errors that have escaped final proofreading. Thank you.

## 2023-02-15 VITALS
TEMPERATURE: 98.5 F | OXYGEN SATURATION: 98 % | BODY MASS INDEX: 24.8 KG/M2 | DIASTOLIC BLOOD PRESSURE: 82 MMHG | WEIGHT: 158 LBS | SYSTOLIC BLOOD PRESSURE: 117 MMHG | RESPIRATION RATE: 16 BRPM | HEIGHT: 67 IN | HEART RATE: 85 BPM

## 2023-02-15 PROBLEM — E11.65 HYPERGLYCEMIA DUE TO TYPE 2 DIABETES MELLITUS (HCC): Status: ACTIVE | Noted: 2023-02-15

## 2023-02-15 PROBLEM — E11.10 DKA (DIABETIC KETOACIDOSIS) (HCC): Status: RESOLVED | Noted: 2023-02-12 | Resolved: 2023-02-15

## 2023-02-15 LAB
ALBUMIN SERPL-MCNC: 3 G/DL (ref 3.5–5)
ALBUMIN/GLOB SERPL: 0.8 (ref 1.1–2.2)
ALP SERPL-CCNC: 109 U/L (ref 45–117)
ALT SERPL-CCNC: 16 U/L (ref 12–78)
ANION GAP SERPL CALC-SCNC: 3 MMOL/L (ref 5–15)
AST SERPL W P-5'-P-CCNC: 8 U/L (ref 15–37)
BASOPHILS # BLD: 0 K/UL (ref 0–0.1)
BASOPHILS NFR BLD: 1 % (ref 0–1)
BILIRUB SERPL-MCNC: 0.6 MG/DL (ref 0.2–1)
BUN SERPL-MCNC: 7 MG/DL (ref 6–20)
BUN/CREAT SERPL: 12 (ref 12–20)
CA-I BLD-MCNC: 9 MG/DL (ref 8.5–10.1)
CHLORIDE SERPL-SCNC: 105 MMOL/L (ref 97–108)
CO2 SERPL-SCNC: 30 MMOL/L (ref 21–32)
CREAT SERPL-MCNC: 0.57 MG/DL (ref 0.7–1.3)
DIFFERENTIAL METHOD BLD: ABNORMAL
EOSINOPHIL # BLD: 0.4 K/UL (ref 0–0.4)
EOSINOPHIL NFR BLD: 8 % (ref 0–7)
ERYTHROCYTE [DISTWIDTH] IN BLOOD BY AUTOMATED COUNT: 12.8 % (ref 11.5–14.5)
GLOBULIN SER CALC-MCNC: 3.8 G/DL (ref 2–4)
GLUCOSE BLD STRIP.AUTO-MCNC: 202 MG/DL (ref 65–100)
GLUCOSE BLD STRIP.AUTO-MCNC: 205 MG/DL (ref 65–100)
GLUCOSE BLD STRIP.AUTO-MCNC: 250 MG/DL (ref 65–100)
GLUCOSE SERPL-MCNC: 236 MG/DL (ref 65–100)
HCT VFR BLD AUTO: 34 % (ref 36.6–50.3)
HGB BLD-MCNC: 12.1 G/DL (ref 12.1–17)
IMM GRANULOCYTES # BLD AUTO: 0 K/UL (ref 0–0.04)
IMM GRANULOCYTES NFR BLD AUTO: 0 % (ref 0–0.5)
LYMPHOCYTES # BLD: 1.8 K/UL (ref 0.8–3.5)
LYMPHOCYTES NFR BLD: 34 % (ref 12–49)
MCH RBC QN AUTO: 29.2 PG (ref 26–34)
MCHC RBC AUTO-ENTMCNC: 35.6 G/DL (ref 30–36.5)
MCV RBC AUTO: 82.1 FL (ref 80–99)
MONOCYTES # BLD: 0.3 K/UL (ref 0–1)
MONOCYTES NFR BLD: 6 % (ref 5–13)
NEUTS SEG # BLD: 2.7 K/UL (ref 1.8–8)
NEUTS SEG NFR BLD: 51 % (ref 32–75)
NRBC # BLD: 0 K/UL (ref 0–0.01)
NRBC BLD-RTO: 0 PER 100 WBC
PERFORMED BY, TECHID: ABNORMAL
PLATELET # BLD AUTO: 165 K/UL (ref 150–400)
PMV BLD AUTO: 11.2 FL (ref 8.9–12.9)
POTASSIUM SERPL-SCNC: 3 MMOL/L (ref 3.5–5.1)
PROT SERPL-MCNC: 6.8 G/DL (ref 6.4–8.2)
RBC # BLD AUTO: 4.14 M/UL (ref 4.1–5.7)
SODIUM SERPL-SCNC: 138 MMOL/L (ref 136–145)
WBC # BLD AUTO: 5.2 K/UL (ref 4.1–11.1)

## 2023-02-15 PROCEDURE — 85025 COMPLETE CBC W/AUTO DIFF WBC: CPT

## 2023-02-15 PROCEDURE — 74011636637 HC RX REV CODE- 636/637: Performed by: NURSE PRACTITIONER

## 2023-02-15 PROCEDURE — 36415 COLL VENOUS BLD VENIPUNCTURE: CPT

## 2023-02-15 PROCEDURE — 74011000250 HC RX REV CODE- 250: Performed by: INTERNAL MEDICINE

## 2023-02-15 PROCEDURE — 82962 GLUCOSE BLOOD TEST: CPT

## 2023-02-15 PROCEDURE — 74011250637 HC RX REV CODE- 250/637: Performed by: INTERNAL MEDICINE

## 2023-02-15 PROCEDURE — 74011250636 HC RX REV CODE- 250/636: Performed by: INTERNAL MEDICINE

## 2023-02-15 PROCEDURE — 80053 COMPREHEN METABOLIC PANEL: CPT

## 2023-02-15 RX ORDER — DEXTROSE MONOHYDRATE 100 MG/ML
0-250 INJECTION, SOLUTION INTRAVENOUS AS NEEDED
Status: DISCONTINUED | OUTPATIENT
Start: 2023-02-15 | End: 2023-02-15 | Stop reason: HOSPADM

## 2023-02-15 RX ORDER — INSULIN LISPRO 100 [IU]/ML
INJECTION, SOLUTION INTRAVENOUS; SUBCUTANEOUS
Status: DISCONTINUED | OUTPATIENT
Start: 2023-02-15 | End: 2023-02-15 | Stop reason: HOSPADM

## 2023-02-15 RX ORDER — INSULIN GLARGINE 100 [IU]/ML
45 INJECTION, SOLUTION SUBCUTANEOUS DAILY
Status: DISCONTINUED | OUTPATIENT
Start: 2023-02-16 | End: 2023-02-15 | Stop reason: HOSPADM

## 2023-02-15 RX ORDER — IBUPROFEN 200 MG
4 TABLET ORAL AS NEEDED
Status: DISCONTINUED | OUTPATIENT
Start: 2023-02-15 | End: 2023-02-15 | Stop reason: HOSPADM

## 2023-02-15 RX ORDER — INSULIN LISPRO 100 [IU]/ML
INJECTION, SOLUTION INTRAVENOUS; SUBCUTANEOUS
Qty: 1 EACH | Refills: 1 | Status: SHIPPED
Start: 2023-02-15

## 2023-02-15 RX ORDER — INSULIN GLARGINE 100 [IU]/ML
45 INJECTION, SOLUTION SUBCUTANEOUS
Qty: 1 ML | Refills: 1 | Status: SHIPPED | OUTPATIENT
Start: 2023-02-15

## 2023-02-15 RX ORDER — METFORMIN HYDROCHLORIDE 1000 MG/1
1000 TABLET ORAL 2 TIMES DAILY WITH MEALS
Qty: 60 TABLET | Refills: 0 | Status: SHIPPED | OUTPATIENT
Start: 2023-02-15 | End: 2023-03-17

## 2023-02-15 RX ADMIN — INSULIN HUMAN 5 UNITS: 100 INJECTION, SUSPENSION SUBCUTANEOUS at 08:19

## 2023-02-15 RX ADMIN — DULOXETINE HYDROCHLORIDE 30 MG: 30 CAPSULE, DELAYED RELEASE ORAL at 08:19

## 2023-02-15 RX ADMIN — ENOXAPARIN SODIUM 40 MG: 100 INJECTION SUBCUTANEOUS at 08:19

## 2023-02-15 RX ADMIN — INSULIN GLARGINE 40 UNITS: 100 INJECTION, SOLUTION SUBCUTANEOUS at 08:19

## 2023-02-15 RX ADMIN — INSULIN LISPRO 2 UNITS: 100 INJECTION, SOLUTION INTRAVENOUS; SUBCUTANEOUS at 08:19

## 2023-02-15 RX ADMIN — INSULIN HUMAN 10 UNITS: 100 INJECTION, SUSPENSION SUBCUTANEOUS at 16:30

## 2023-02-15 RX ADMIN — INSULIN LISPRO 3 UNITS: 100 INJECTION, SOLUTION INTRAVENOUS; SUBCUTANEOUS at 11:22

## 2023-02-15 RX ADMIN — INSULIN LISPRO 6 UNITS: 100 INJECTION, SOLUTION INTRAVENOUS; SUBCUTANEOUS at 16:30

## 2023-02-15 RX ADMIN — SODIUM CHLORIDE, PRESERVATIVE FREE 10 ML: 5 INJECTION INTRAVENOUS at 13:11

## 2023-02-15 NOTE — PROGRESS NOTES
DC Plan: Home    Cm was informed attending is going to discharge pt. Cm met with pt at the bedside. Cm asked pt if he was having financial issues with obtaining his insulin. Pt does not have financial issues with obtaining insulin. Pt indicated he just needs an RX sent to his pharmacy so he is able to get his insulin. Attending will send RX to pt's pharmacy. Cm provided pt with a contact number and address to a diabetes education center in [place] [id number] if he is interested in their program.     Discharge plan of care/case management plan validated with provider's discharge order.

## 2023-02-15 NOTE — PROGRESS NOTES
Problem: Diabetes Self-Management  Goal: *Monitoring blood glucose, interpreting and using results  Description: Identify recommended blood glucose targets  and personal targets. Outcome: Progressing Towards Goal     Problem: Diabetes Self-Management  Goal: *Using medications safely  Description: State effect of diabetes medications on diabetes; name diabetes medication taking, action and side effects. Outcome: Progressing Towards Goal     Problem: Diabetes Self-Management  Goal: *Incorporating nutritional management into lifestyle  Description: Describe effect of type, amount and timing of food on blood glucose; list 3 methods for planning meals. Outcome: Progressing Towards Goal     Problem: Diabetes Self-Management  Goal: *Prevention, detection, treatment of acute complications  Description: List symptoms of hyper- and hypoglycemia; describe how to treat low blood sugar and actions for lowering  high blood glucose level. Outcome: Progressing Towards Goal     Problem: Falls - Risk of  Goal: *Absence of Falls  Description: Document Salima Pena Fall Risk and appropriate interventions in the flowsheet.   Outcome: Progressing Towards Goal  Note: Fall Risk Interventions:

## 2023-02-15 NOTE — PROGRESS NOTES
Patient medically cleared for discharge. Both PIVs have been removed. Patient has arranged his own follow up appointment with PCP.      Patient currently awaiting his significant other to arrive for discharge home

## 2023-02-15 NOTE — PROGRESS NOTES
Problem: Falls - Risk of  Goal: *Absence of Falls  Description: Document Salima Pena Fall Risk and appropriate interventions in the flowsheet.   Outcome: Progressing Towards Goal  Note: Fall Risk Interventions:

## 2023-02-15 NOTE — PROGRESS NOTES
VSS. Patient given discharge instructions. Medications and follow-up appointments reviewed. IV removed. Case management, provider, and primary nurse aware of discharge. Discharge plan of care/case management plan validated with provider discharge order. Patient waiting for ride.

## 2023-02-15 NOTE — DISCHARGE SUMMARY
Hospitalist Discharge Summary     Patient ID:    Obinna Omalley  [id number]  46 y.o.  1980    Admit date: 2/12/2023    Discharge date : 2/15/2023      Final Diagnoses: Active Problems:    Hyperglycemia due to type 2 diabetes mellitus ([facility] (2/15/2023)        Reason for Hospitalization/Hospital Course:   Obinna Torrez is a 38year old  male with PMH of diabetes on insulin,neuropathy,  hyperlipidemia presented to the ED with complaints of nausea and vomiting for the past 1 week. Otherwise denies fever or chills, abdominal pain, shortness of breath, nausea/vomiting/diarrhea, or UTI symptoms. Noted home blood sugar being high and presented to outside ED. He is transferred here for admission. At outside facility found to have glucose greater than 500 with elevated gap acidosis. Insulin ggt was initiated. Patient reports he has been skipping insulin doses as he has been unable to get his prescription refilled. In the ED, vital signs stable. Repeat . Gap closed but HCO3 12. Mild leukocytosis  Blood glucose improving. He was transitioned off of insulin drip and started on Lantus 45 units at bedtime. High dose sliding scale, Educated on importance of medication compliance, dietary education and importance of  follow up with hid primary care. His blood glucose improved but remains elevated. Discussed importance of  checking his blood glucose 4 times daily. Patient verbalizes understanding. He states he will  his prescriptions and has an appointment scheduled with Kacy Srivastava on 2/22/23. Patient cleared for discharge home with follow up with Kayc Srivastava as scheduled. Discharge Medications:   Current Discharge Medication List        START taking these medications    Details   insulin glargine (LANTUS) 100 unit/mL injection 45 Units by SubCUTAneous route nightly. Caution: Long Acting Insulin. DO NOT HOLD without physician order.  DO NOT MIX or dilute with any other insulin. Hold for blood glucose less than 120  Indications: type 2 diabetes mellitus, Hold for blood glucose less than 120  Qty: 1 mL, Refills: 1  Start date: 2/15/2023      insulin lispro (HUMALOG) 100 unit/mL injection INITIATE INSULIN CORRECTIVE PROTOCOL:  Very Insulin Resistant  For Blood Sugar (mg/dL) of:              Less than 150 =   0 units  150 -199 =   3 units  200 -249 =   6 units  250 -299 =   9 units  300 -349 =   12 units  350 and above =   15 units and Call Physician  Initiate Hypoglycemic protocol if blood glucose is <70 mg/dL. Fast Acting - Administer Immediately - or within 15 minutes of start of meal, if mealtime coverage. Indications: type 2 diabetes mellitus  Qty: 1 Each, Refills: 1  Start date: 2/15/2023           CONTINUE these medications which have CHANGED    Details   metFORMIN (GLUCOPHAGE) 1,000 mg tablet Take 1 Tablet by mouth two (2) times daily (with meals) for 30 days. Qty: 60 Tablet, Refills: 0  Start date: 2/15/2023, End date: 3/17/2023           CONTINUE these medications which have NOT CHANGED    Details   DULoxetine (CYMBALTA) 30 mg capsule Take 30 mg by mouth daily. pregabalin (LYRICA) 100 mg capsule Take 100 mg by mouth two (2) times a day. OneTouch Delica Plus Lancet 30 gauge misc USE TO CHECK GLUCOSE TWICE DAILY      rosuvastatin (CRESTOR) 10 mg tablet Take 1 Tablet by mouth nightly. Indications: high cholesterol and high triglycerides  Qty: 90 Tablet, Refills: 1    Associated Diagnoses: Hyperlipidemia associated with type 2 diabetes mellitus (HCC)           STOP taking these medications       insulin glargine (LANTUS,BASAGLAR) 100 unit/mL (3 mL) inpn Comments:   Reason for Stopping: Follow up Care:    1. Mervat Freeman NP in 1-2 weeks.       Follow-up Information       Follow up With Specialties Details Why Contact Info    Mervat Freeman NP Family Nurse Practitioner Follow up in 1 week(s) [facility] Follow up Hyperglycemia [address] [facility] [address]  [address] [facility], [address] [place], [place] [address]sanjeevndAugust[facility] Health Family Nurse Practitioner   [address] [facility]  [phone]                Patient Follow Up Instructions: Activity: Activity as tolerated  Diet:  Diabetic Diet  Wound Care: None needed     Condition at Discharge:  Stable  __________________________________________________________________    Disposition  Home or Self Care  ____________________________________________________________________    Code Status:  Full Code  ___________________________________________________________________    Discharge Exam:  Patient seen and examined by me on discharge day. Pertinent Findings:  Gen:    Not in distress  Chest: Clear lungs  CVS:   Regular rhythm.   No edema  Abd:  Soft, not distended, not tender  Neuro:  Alert        CONSULTATIONS: None    Significant Diagnostic Studies:   Recent Results (from the past 24 hour(s))   GLUCOSE, POC    Collection Time: 02/14/23  4:47 PM   Result Value Ref Range    Glucose (POC) 261 (H) 65 - 100 mg/dL    Performed by Salomón Schmid, POC    Collection Time: 02/14/23  9:52 PM   Result Value Ref Range    Glucose (POC) 248 (H) 65 - 100 mg/dL    Performed by Jossy Manrique (Float Pool)    GLUCOSE, POC    Collection Time: 02/15/23  7:30 AM   Result Value Ref Range    Glucose (POC) 202 (H) 65 - 100 mg/dL    Performed by Sissy LAKHANI WITH AUTOMATED DIFF    Collection Time: 02/15/23  9:49 AM   Result Value Ref Range    WBC 5.2 4.1 - 11.1 K/uL    RBC 4.14 4.10 - 5.70 M/uL    HGB 12.1 12.1 - 17.0 g/dL    HCT 34.0 (L) 36.6 - 50.3 %    MCV 82.1 80.0 - 99.0 FL    MCH 29.2 26.0 - 34.0 PG    MCHC 35.6 30.0 - 36.5 g/dL    RDW 12.8 11.5 - 14.5 %    PLATELET 942 525 - 128 K/uL    MPV 11.2 8.9 - 12.9 FL    NRBC 0.0 0.0  WBC    ABSOLUTE NRBC 0.00 0.00 - 0.01 K/uL    NEUTROPHILS 51 32 - 75 %    LYMPHOCYTES 34 12 - 49 %    MONOCYTES 6 5 - 13 %    EOSINOPHILS 8 (H) 0 - 7 %    BASOPHILS 1 0 - 1 % IMMATURE GRANULOCYTES 0 0 - 0.5 %    ABS. NEUTROPHILS 2.7 1.8 - 8.0 K/UL    ABS. LYMPHOCYTES 1.8 0.8 - 3.5 K/UL    ABS. MONOCYTES 0.3 0.0 - 1.0 K/UL    ABS. EOSINOPHILS 0.4 0.0 - 0.4 K/UL    ABS. BASOPHILS 0.0 0.0 - 0.1 K/UL    ABS. IMM. GRANS. 0.0 0.00 - 0.04 K/UL    DF AUTOMATED     METABOLIC PANEL, COMPREHENSIVE    Collection Time: 02/15/23  9:49 AM   Result Value Ref Range    Sodium 138 136 - 145 mmol/L    Potassium 3.0 (L) 3.5 - 5.1 mmol/L    Chloride 105 97 - 108 mmol/L    CO2 30 21 - 32 mmol/L    Anion gap 3 (L) 5 - 15 mmol/L    Glucose 236 (H) 65 - 100 mg/dL    BUN 7 6 - 20 mg/dL    Creatinine 0.57 (L) 0.70 - 1.30 mg/dL    BUN/Creatinine ratio 12 12 - 20      eGFR >60 >60 ml/min/1.73m2    Calcium 9.0 8.5 - 10.1 mg/dL    Bilirubin, total 0.6 0.2 - 1.0 mg/dL    AST (SGOT) 8 (L) 15 - 37 U/L    ALT (SGPT) 16 12 - 78 U/L    Alk.  phosphatase 109 45 - 117 U/L    Protein, total 6.8 6.4 - 8.2 g/dL    Albumin 3.0 (L) 3.5 - 5.0 g/dL    Globulin 3.8 2.0 - 4.0 g/dL    A-G Ratio 0.8 (L) 1.1 - 2.2     GLUCOSE, POC    Collection Time: 02/15/23 10:59 AM   Result Value Ref Range    Glucose (POC) 250 (H) 65 - 100 mg/dL    Performed by Saray Carbajal      No orders to display       Time spent in direct and indirect care including coordination of services: Greater than 35 minutes    Signed:  Holly Parks NP  2/15/2023  4:16 PM

## 2023-02-16 ENCOUNTER — TELEPHONE (OUTPATIENT)
Dept: PRIMARY CARE CLINIC | Age: 43
End: 2023-02-16

## 2023-02-16 DIAGNOSIS — E11.69 HYPERLIPIDEMIA ASSOCIATED WITH TYPE 2 DIABETES MELLITUS (HCC): ICD-10-CM

## 2023-02-16 DIAGNOSIS — E78.5 HYPERLIPIDEMIA ASSOCIATED WITH TYPE 2 DIABETES MELLITUS (HCC): ICD-10-CM

## 2023-02-16 RX ORDER — ROSUVASTATIN CALCIUM 10 MG/1
10 TABLET, COATED ORAL
Qty: 90 TABLET | Refills: 0 | Status: SHIPPED | OUTPATIENT
Start: 2023-02-16 | End: 2023-02-17 | Stop reason: SDUPTHER

## 2023-02-16 NOTE — TELEPHONE ENCOUNTER
Care Transitions Initial Follow Up Call    Outreach made within 2 business days of discharge: Yes    Patient: Vel Ambrose. Patient : 1980   MRN: [id number]  Reason for Admission: Sugar went too high. Discharge Date: 2/15/23       Spoke with: Patient    Discharge department/facility: [facility]    TCM Interactive Patient Contact:  Was patient able to fill all prescriptions: Yes  Was patient instructed to bring all medications to the follow-up visit: Yes  Is patient taking all medications as directed in the discharge summary?  Yes  Does patient understand their discharge instructions: Yes  Does patient have questions or concerns that need addressed prior to 7-14 day follow up office visit: No    Scheduled appointment with PCP within 7-14 days Yes    Follow Up  Future Appointments   Date Time Provider Stew Schwartz   2023  3:30 PM JT Leroy   2023 10:00 AM JT Leroy LPN

## 2023-02-17 ENCOUNTER — TELEPHONE (OUTPATIENT)
Dept: PRIMARY CARE CLINIC | Age: 43
End: 2023-02-17

## 2023-02-17 DIAGNOSIS — E11.69 HYPERLIPIDEMIA ASSOCIATED WITH TYPE 2 DIABETES MELLITUS (HCC): ICD-10-CM

## 2023-02-17 DIAGNOSIS — E78.5 HYPERLIPIDEMIA ASSOCIATED WITH TYPE 2 DIABETES MELLITUS (HCC): ICD-10-CM

## 2023-02-17 RX ORDER — ROSUVASTATIN CALCIUM 10 MG/1
10 TABLET, COATED ORAL
Qty: 90 TABLET | Refills: 0 | Status: SHIPPED | OUTPATIENT
Start: 2023-02-17

## 2023-02-22 ENCOUNTER — OFFICE VISIT (OUTPATIENT)
Dept: PRIMARY CARE CLINIC | Age: 43
End: 2023-02-22
Payer: MEDICAID

## 2023-02-22 ENCOUNTER — TELEPHONE (OUTPATIENT)
Dept: PRIMARY CARE CLINIC | Age: 43
End: 2023-02-22

## 2023-02-22 VITALS
HEIGHT: 67 IN | HEART RATE: 83 BPM | WEIGHT: 177.4 LBS | SYSTOLIC BLOOD PRESSURE: 99 MMHG | BODY MASS INDEX: 27.84 KG/M2 | RESPIRATION RATE: 18 BRPM | OXYGEN SATURATION: 98 % | TEMPERATURE: 98.5 F | DIASTOLIC BLOOD PRESSURE: 53 MMHG

## 2023-02-22 DIAGNOSIS — Z79.4 TYPE 2 DIABETES MELLITUS WITH HYPERGLYCEMIA, WITH LONG-TERM CURRENT USE OF INSULIN (HCC): Primary | ICD-10-CM

## 2023-02-22 DIAGNOSIS — E78.5 HYPERLIPIDEMIA ASSOCIATED WITH TYPE 2 DIABETES MELLITUS (HCC): ICD-10-CM

## 2023-02-22 DIAGNOSIS — G62.9 NEUROPATHY: ICD-10-CM

## 2023-02-22 DIAGNOSIS — Z09 HOSPITAL DISCHARGE FOLLOW-UP: ICD-10-CM

## 2023-02-22 DIAGNOSIS — E11.69 HYPERLIPIDEMIA ASSOCIATED WITH TYPE 2 DIABETES MELLITUS (HCC): ICD-10-CM

## 2023-02-22 DIAGNOSIS — E11.65 TYPE 2 DIABETES MELLITUS WITH HYPERGLYCEMIA, WITH LONG-TERM CURRENT USE OF INSULIN (HCC): Primary | ICD-10-CM

## 2023-02-22 PROBLEM — R07.9 CHEST PAIN: Status: RESOLVED | Noted: 2022-08-18 | Resolved: 2023-02-22

## 2023-02-22 PROCEDURE — 99495 TRANSJ CARE MGMT MOD F2F 14D: CPT | Performed by: NURSE PRACTITIONER

## 2023-02-22 PROCEDURE — 1111F DSCHRG MED/CURRENT MED MERGE: CPT | Performed by: NURSE PRACTITIONER

## 2023-02-22 RX ORDER — PREGABALIN 75 MG/1
75 CAPSULE ORAL 2 TIMES DAILY
COMMUNITY
Start: 2023-02-02

## 2023-02-22 RX ORDER — FLASH GLUCOSE SENSOR
KIT MISCELLANEOUS
Qty: 2 KIT | Refills: 11 | Status: SHIPPED | OUTPATIENT
Start: 2023-02-22

## 2023-02-22 RX ORDER — INSULIN GLARGINE-YFGN 100 [IU]/ML
INJECTION, SOLUTION SUBCUTANEOUS
COMMUNITY
Start: 2023-02-16

## 2023-02-22 RX ORDER — FLASH GLUCOSE SCANNING READER
EACH MISCELLANEOUS
Qty: 1 EACH | Refills: 0 | Status: SHIPPED | OUTPATIENT
Start: 2023-02-22

## 2023-02-22 RX ORDER — INSULIN LISPRO 100 [IU]/ML
INJECTION, SOLUTION INTRAVENOUS; SUBCUTANEOUS
Qty: 1 EACH | Refills: 1
Start: 2023-02-22 | End: 2023-02-22 | Stop reason: SDUPTHER

## 2023-02-22 RX ORDER — INSULIN LISPRO 100 [IU]/ML
INJECTION, SOLUTION INTRAVENOUS; SUBCUTANEOUS
Qty: 45 ML | Refills: 1
Start: 2023-02-22

## 2023-02-22 NOTE — PROGRESS NOTES
Transitional Care Management Progress Note    Patient: Kathe Ritter : 1980  PCP: Jose Schmid NP    Date of office visit: 2023   Date of admission: 23  Date of discharge: 2/15/23  Hospital: [facility]    Call initiated w/i 2 business dates of discharge: Yes   Date of the most recent call to the patient: 2023 11:32 AM      Assessment/Plan:   Diagnoses and all orders for this visit:    1. Type 2 diabetes mellitus with hyperglycemia, with long-term current use of insulin ([facility])  Lab Results   Component Value Date/Time    Hemoglobin A1c 10.4 (H) 2023 03:57 AM    Hemoglobin A1c (POC) 6.8 2022 10:53 AM      Glucose had been well controlled until recently   Reports rationing insulin due to difficulty coming for appointments. Not taking insulin correctly at this time  Was educated on types of insulin, use and labeling   Will continue the lantus 40 units at bedtime since he was doing well on this during hospital stay. Resent the humalog sliding scale and he will update if any difficulty picking up  Would like to avoid prolonged mult-dosed insulin regimen given he is now improved. We discussed trial with jardiance 10mg daily and he is agreeable  Reviewed use and potential side effects. Advised to stay well hydrated and notify immediately if signs of mycotic infection develop. Continue metformin 1000mg twice daily   Check glucose 3-4 times daily and bring meter to next appointment. Sending order for cary Naylor if covered  He had eye appointment 1 day ago and will request notes  Check feet daily and notify provider immediately if wound develops. empagliflozin (JARDIANCE) 10 mg tablet; Take 1 Tablet by mouth daily. -     insulin lispro (HUMALOG) 100 unit/mL injection;  Inject insulin 3 times daily before meals  INITIATE INSULIN CORRECTIVE PROTOCOL:  Very Insulin Resistant  For Blood Sugar (mg/dL) of:              Less than 150 =   0 units  150 -199 =   3 units  200 -249 =   6 units  250 -299 =   9 units  300 -349 =   12 units  350 and above =   15 units and Call Physician  Initiate Hypoglycemic protocol if blood glucose is <70 mg/dL. Fast Acting - Administer Immediately - or within 15 minutes of start of meal, if mealtime coverage. Max daily units 45. Indications: type 2 diabetes mellitus    2. Hyperlipidemia associated with type 2 diabetes mellitus ([facility] [address]  Lab Results   Component Value Date/Time    LDL, calculated 63 10/26/2021 10:39 AM   Resumed the rosuvastatin 10mg daily and will plan to check lipid panel in 3 mo. 3. Neuropathy  Currently taking cymbalta as directed  Take lyrica at bedtime but not currently taking during the day as feels symptoms currently controlled. 4. Hospital discharge follow-up  -     OR [address] [facility] MEDS RECONCILED W/CURRENT MED LIST    The complexity of medical decision making for this patient's transitional care is moderate     Follow-up and Dispositions    Return in about 4 weeks (around 3/22/2023). Subjective:   Mauricio Marinelli is a 38 y.o. male presenting today for follow-up after hospital discharge. This encounter and supporting documentation was reviewed if available. Medication reconciliation was performed today. The main problem requiring admission was uncontrolled diabetes with hyperglycemia . Complications during admission: none. States that prior to admission, he had been rationing his insulin due to concerns of running out. Began vomiting on 2/12/23 and presented to ED. Is feeling well today but reports that he did not get but one insulin. Has been taking the lantus per sliding scale instead of the humalog. Also is taking the metformin. Reports fasting glucose this am was 245. Interval history/Current status: stable    Admitting symptoms have: improved      Medications marked \"taking\" at this time:  Prior to Admission medications    Medication Sig Start Date End Date Taking? Authorizing Provider   empagliflozin (JARDIANCE) 10 mg tablet Take 1 Tablet by mouth daily. 2/22/23  Yes Codie Zimmerman NP   insulin lispro (HUMALOG) 100 unit/mL injection Inject insulin 3 times daily before meals  INITIATE INSULIN CORRECTIVE PROTOCOL:  Very Insulin Resistant  For Blood Sugar (mg/dL) of:              Less than 150 =   0 units  150 -199 =   3 units  200 -249 =   6 units  250 -299 =   9 units  300 -349 =   12 units  350 and above =   15 units and Call Physician  Initiate Hypoglycemic protocol if blood glucose is <70 mg/dL. Fast Acting - Administer Immediately - or within 15 minutes of start of meal, if mealtime coverage. Max daily units 45. Indications: type 2 diabetes mellitus 2/22/23  Yes Codie Zimmerman NP   insulin glargine-yfgn 100 unit/mL soln INJECT 45 UNITS EVERY DAY AT BEDTIME. LONG ACTING INSULIN HOLD FOR BLOOD GLUCOSE LESS THAN 120. DO NOT HOLD WITHOUT PHYSICIAN ORDER. DO NOT MIX OR DILUTE WITH ANY OTHER INSULIN 2/16/23   Provider, Historical   pregabalin (LYRICA) 75 mg capsule Take 75 mg by mouth two (2) times a day. 2/2/23   Provider, Historical   insulin lispro (HUMALOG) 100 unit/mL injection INITIATE INSULIN CORRECTIVE PROTOCOL:  Very Insulin Resistant  For Blood Sugar (mg/dL) of:              Less than 150 =   0 units  150 -199 =   3 units  200 -249 =   6 units  250 -299 =   9 units  300 -349 =   12 units  350 and above =   15 units and Call Physician  Initiate Hypoglycemic protocol if blood glucose is <70 mg/dL. Fast Acting - Administer Immediately - or within 15 minutes of start of meal, if mealtime coverage. Indications: type 2 diabetes mellitus 2/22/23 2/22/23  Codie Zimmerman NP   rosuvastatin (CRESTOR) 10 mg tablet Take 1 Tablet by mouth nightly. Indications: high cholesterol and high triglycerides 2/17/23   Codie Zimmerman NP   metFORMIN (GLUCOPHAGE) 1,000 mg tablet Take 1 Tablet by mouth two (2) times daily (with meals) for 30 days.  2/15/23 3/17/23  Patrizia Smith NP insulin glargine (LANTUS) 100 unit/mL injection 45 Units by SubCUTAneous route nightly. Caution: Long Acting Insulin. DO NOT HOLD without physician order. DO NOT MIX or dilute with any other insulin. Hold for blood glucose less than 120  Indications: type 2 diabetes mellitus, Hold for blood glucose less than 120 2/15/23   Job aHwley NP   insulin lispro (HUMALOG) 100 unit/mL injection INITIATE INSULIN CORRECTIVE PROTOCOL:  Very Insulin Resistant  For Blood Sugar (mg/dL) of:              Less than 150 =   0 units  150 -199 =   3 units  200 -249 =   6 units  250 -299 =   9 units  300 -349 =   12 units  350 and above =   15 units and Call Physician  Initiate Hypoglycemic protocol if blood glucose is <70 mg/dL. Fast Acting - Administer Immediately - or within 15 minutes of start of meal, if mealtime coverage. Indications: type 2 diabetes mellitus  Patient taking differently: INITIATE INSULIN CORRECTIVE PROTOCOL:  Very Insulin Resistant  For Blood Sugar (mg/dL) of:              Less than 150 =   0 units  150 -199 =   3 units  200 -249 =   6 units  250 -299 =   9 units  300 -349 =   12 units  350 and above =   15 units and Call Physician  Initiate Hypoglycemic protocol if blood glucose is <70 mg/dL. Fast Acting - Administer Immediately - or within 15 minutes of start of meal, if mealtime coverage. Indications: type 2 diabetes mellitus 2/15/23 2/22/23  Job Hawley NP   DULoxetine (CYMBALTA) 30 mg capsule Take 30 mg by mouth daily. Other, Phys, MD   OneTouch Delica Plus Lancet 30 gauge misc USE TO CHECK GLUCOSE TWICE DAILY 8/5/22   Provider, Historical   pregabalin (LYRICA) 100 mg capsule Take 100 mg by mouth two (2) times a day. 2/22/23  Provider, Historical        Review of Systems   Constitutional: Negative. HENT:  Negative for congestion, ear pain, hearing loss and sore throat. Eyes: Negative. Negative for blurred vision, photophobia, pain, discharge and redness. Respiratory: Negative. Cardiovascular:  Negative for chest pain, palpitations, orthopnea, claudication and leg swelling. Gastrointestinal:  Negative for abdominal pain, blood in stool, constipation, diarrhea, heartburn, nausea and vomiting. Genitourinary:  Negative for dysuria, flank pain, frequency, hematuria and urgency. Musculoskeletal: Negative. Negative for back pain, falls, joint pain, myalgias and neck pain. Skin:  Negative for itching and rash. Neurological:  Negative for dizziness, tingling, tremors, loss of consciousness, weakness and headaches. Psychiatric/Behavioral:  Negative for depression, hallucinations, substance abuse and suicidal ideas. The patient is not nervous/anxious and does not have insomnia. Patient Active Problem List   Diagnosis Code    Diabetes mellitus ([facility] E11.9    Hyperlipidemia associated with type 2 diabetes mellitus ([facility] E11.69, E78.5    Mixed hyperlipidemia E78.2    Hyperglycemia due to type 2 diabetes mellitus ([facility] E11.65     Patient Active Problem List    Diagnosis Date Noted    Hyperglycemia due to type 2 diabetes mellitus ([facility] 02/15/2023    Mixed hyperlipidemia 03/29/2022    Hyperlipidemia associated with type 2 diabetes mellitus ([facility] 10/25/2021    Diabetes mellitus ([facility] 12/16/2019     Current Outpatient Medications   Medication Sig Dispense Refill    empagliflozin (JARDIANCE) 10 mg tablet Take 1 Tablet by mouth daily. 30 Tablet 2    insulin lispro (HUMALOG) 100 unit/mL injection Inject insulin 3 times daily before meals  INITIATE INSULIN CORRECTIVE PROTOCOL:  Very Insulin Resistant  For Blood Sugar (mg/dL) of:              Less than 150 =   0 units  150 -199 =   3 units  200 -249 =   6 units  250 -299 =   9 units  300 -349 =   12 units  350 and above =   15 units and Call Physician  Initiate Hypoglycemic protocol if blood glucose is <70 mg/dL. Fast Acting - Administer Immediately - or within 15 minutes of start of meal, if mealtime coverage. Max daily units 45. Indications: type 2 diabetes mellitus 45 mL 1    insulin glargine-yfgn 100 unit/mL soln INJECT 45 UNITS EVERY DAY AT BEDTIME. LONG ACTING INSULIN HOLD FOR BLOOD GLUCOSE LESS THAN 120. DO NOT HOLD WITHOUT PHYSICIAN ORDER. DO NOT MIX OR DILUTE WITH ANY OTHER INSULIN      pregabalin (LYRICA) 75 mg capsule Take 75 mg by mouth two (2) times a day. rosuvastatin (CRESTOR) 10 mg tablet Take 1 Tablet by mouth nightly. Indications: high cholesterol and high triglycerides 90 Tablet 0    metFORMIN (GLUCOPHAGE) 1,000 mg tablet Take 1 Tablet by mouth two (2) times daily (with meals) for 30 days. 60 Tablet 0    insulin glargine (LANTUS) 100 unit/mL injection 45 Units by SubCUTAneous route nightly. Caution: Long Acting Insulin. DO NOT HOLD without physician order. DO NOT MIX or dilute with any other insulin. Hold for blood glucose less than 120  Indications: type 2 diabetes mellitus, Hold for blood glucose less than 120 1 mL 1    DULoxetine (CYMBALTA) 30 mg capsule Take 30 mg by mouth daily.       OneTouch Delica Plus Lancet 30 gauge misc USE TO CHECK GLUCOSE TWICE DAILY       Allergies   Allergen Reactions    Kiwi Hives     Past Medical History:   Diagnosis Date    Chest discomfort     Diabetes mellitus ([address] 12/16/2019    Type II    Diabetic neuropathy ([facility]     feet    Hyperlipidemia associated with type 2 diabetes mellitus ([facility]      Past Surgical History:   Procedure Laterality Date    HX APPENDECTOMY       Family History   Problem Relation Age of Onset    No Known Problems Mother     No Known Problems Father     Diabetes Maternal Grandmother     Diabetes Paternal Grandmother     Hypertension Paternal Grandmother      Social History     Tobacco Use    Smoking status: Some Days     Years: 30.00     Types: Cigarettes     Passive exposure: Current    Smokeless tobacco: Never   Substance Use Topics    Alcohol use: Not Currently          Objective:   Visit Vitals  BP (!) 99/53 (BP 1 Location: Left upper arm, BP Patient Position: Sitting, BP Cuff Size: Large adult)   Pulse 83   Temp 98.5 °F (36.9 °C) (Temporal)   Resp 18   Ht 5' 7\" (1.702 m)   Wt 177 lb 6.4 oz (80.5 kg)   SpO2 98%   BMI 27.78 kg/m²        Physical Exam  Vitals and nursing note reviewed. Constitutional:       Appearance: Normal appearance. He is obese. Cardiovascular:      Rate and Rhythm: Normal rate and regular rhythm. Pulses: Normal pulses. Heart sounds: Normal heart sounds. Pulmonary:      Effort: Pulmonary effort is normal.      Breath sounds: Normal breath sounds. Abdominal:      General: Bowel sounds are normal.      Palpations: Abdomen is soft. Tenderness: There is no abdominal tenderness. There is no guarding. Skin:     General: Skin is warm and dry. Neurological:      Mental Status: He is alert and oriented to person, place, and time. Mental status is at baseline. We discussed the expected course, resolution and complications of the diagnosis(es) in detail. Medication risks, benefits, costs, interactions, and alternatives were discussed as indicated. I advised him to contact the office if his condition worsens, changes or fails to improve as anticipated. He expressed understanding with the diagnosis(es) and plan. Catrachita Larson NP    No retinopathy after eye exam- my eye doctor.

## 2023-02-22 NOTE — PROGRESS NOTES
Chief Complaint   Patient presents with    Hospital Follow Up    Diabetes     Follow up       1. Have you been to the ER, urgent care clinic since your last visit? Hospitalized since your last visit? No    2. Have you seen or consulted any other health care providers outside of the [address] [place] [address] since your last visit? Include any pap smears or colon screening.  No

## 2023-02-23 ENCOUNTER — TELEPHONE (OUTPATIENT)
Dept: PRIMARY CARE CLINIC | Age: 43
End: 2023-02-23

## 2023-02-23 NOTE — TELEPHONE ENCOUNTER
Presley Mello LPN spoke with patient regarding medication. Faxed release form to [facility] [phone].

## 2023-02-23 NOTE — TELEPHONE ENCOUNTER
Patient would like for you to give him a call. Needs clarification on medication.   When he went to Pharmacy to  medication, it was Lantus; not Lispro

## 2023-03-23 ENCOUNTER — OFFICE VISIT (OUTPATIENT)
Dept: PRIMARY CARE CLINIC | Age: 43
End: 2023-03-23
Payer: MEDICAID

## 2023-03-23 ENCOUNTER — TELEPHONE (OUTPATIENT)
Dept: PRIMARY CARE CLINIC | Age: 43
End: 2023-03-23

## 2023-03-23 VITALS
HEIGHT: 67 IN | RESPIRATION RATE: 18 BRPM | WEIGHT: 179.4 LBS | SYSTOLIC BLOOD PRESSURE: 96 MMHG | HEART RATE: 81 BPM | TEMPERATURE: 97.4 F | BODY MASS INDEX: 28.16 KG/M2 | DIASTOLIC BLOOD PRESSURE: 63 MMHG | OXYGEN SATURATION: 98 %

## 2023-03-23 DIAGNOSIS — E87.6 HYPOKALEMIA: ICD-10-CM

## 2023-03-23 DIAGNOSIS — E11.65 TYPE 2 DIABETES MELLITUS WITH HYPERGLYCEMIA, WITH LONG-TERM CURRENT USE OF INSULIN (HCC): Primary | ICD-10-CM

## 2023-03-23 DIAGNOSIS — Z79.4 TYPE 2 DIABETES MELLITUS WITH HYPERGLYCEMIA, WITH LONG-TERM CURRENT USE OF INSULIN (HCC): Primary | ICD-10-CM

## 2023-03-23 LAB — GLUCOSE POC: 97 MG/DL

## 2023-03-23 PROCEDURE — 3046F HEMOGLOBIN A1C LEVEL >9.0%: CPT | Performed by: NURSE PRACTITIONER

## 2023-03-23 PROCEDURE — 82962 GLUCOSE BLOOD TEST: CPT | Performed by: NURSE PRACTITIONER

## 2023-03-23 PROCEDURE — 99214 OFFICE O/P EST MOD 30 MIN: CPT | Performed by: NURSE PRACTITIONER

## 2023-03-23 RX ORDER — METFORMIN HYDROCHLORIDE 1000 MG/1
1000 TABLET ORAL 2 TIMES DAILY
COMMUNITY
Start: 2023-03-17 | End: 2023-03-23 | Stop reason: SDUPTHER

## 2023-03-23 RX ORDER — INSULIN GLARGINE 100 [IU]/ML
40 INJECTION, SOLUTION SUBCUTANEOUS
Qty: 36 ML | Refills: 0 | Status: SHIPPED | OUTPATIENT
Start: 2023-03-23

## 2023-03-23 RX ORDER — METFORMIN HYDROCHLORIDE 1000 MG/1
1000 TABLET ORAL 2 TIMES DAILY
Qty: 180 TABLET | Refills: 2 | Status: SHIPPED | OUTPATIENT
Start: 2023-03-23

## 2023-03-23 NOTE — PROGRESS NOTES
Chief Complaint   Patient presents with    Diabetes     Follow up    1. Have you been to the ER, urgent care clinic since your last visit? Hospitalized since your last visit? No    2. Have you seen or consulted any other health care providers outside of the [address] [place] [address] since your last visit? Include any pap smears or colon screening.  No

## 2023-03-23 NOTE — PROGRESS NOTES
Progress Note    Patient: Nkechi Neumann. : 1980  PCP: Glenys Novoa NP      Assessment/Plan:   Diagnoses and all orders for this visit:    1. Type 2 diabetes mellitus with hyperglycemia, with long-term current use of insulin (Formerly [facility])  Lab Results   Component Value Date/Time    Hemoglobin A1c 10.4 (H) 2023 03:57 AM    Hemoglobin A1c (POC) 6.8 2022 10:53 AM      Glucose is much improved since last visit  Fasting averaging 90s-100s with 1 reading asx at 78. Not using the meal time insulin due to pharmacy did not have and readings not requiring coverage  Continues metformin 1000mg twice daily , lantus 45 units nightly and Jardiance 10mg daily  Will reduce the lantus to 40 units nightly give 1 low reading and want to avoid hypoglycemia. Continue to encourage following diabetic diet and getting regular exercise 3-5 times weekly for 30-45 minutes  Advised to stay well hydrated and notify immediately if signs of mycotic infection develop. Received Zulay Peter and will check glucose 2-3 times daily and bring to next appointment  He had eye appointment in past month and will request report  Check feet daily and notify provider immediately if wound develops. - AMB POC GLUCOSE BLOOD, BY GLUCOSE MONITORING DEVICE  - insulin glargine (LANTUS) 100 unit/mL injection; 40 Units by SubCUTAneous route nightly. Indications: type 2 diabetes mellitus  Dispense: 36 mL; Refill: 0  - empagliflozin (JARDIANCE) 10 mg tablet; Take 1 Tablet by mouth daily. Dispense: 90 Tablet; Refill: 2  - metFORMIN (GLUCOPHAGE) 1,000 mg tablet; Take 1 Tablet by mouth two (2) times a day. Dispense: 180 Tablet; Refill: 2    2.  Hypokalemia  Lab Results   Component Value Date/Time    Potassium 3.0 (L) 02/15/2023 09:49 AM     Repeat potassium low at 3.0 at hospital and replacement given  Will repeat today  - METABOLIC PANEL, BASIC     The complexity of medical decision making for this patient's transitional care is moderate Subjective:   Brando Brizuela is a 38 y.o. male presenting today for 1 mo follow up of uncontrolled type 2 diabetes and new medication. Reports he is doing well with glucose readings averaging about  fasting. Did have 1 reading around 66 but was not symptomatic. Never received the Humalog but states that he has not required. No side effects with Jardiance and metformin. Medications marked \"taking\" at this time:  Prior to Admission medications    Medication Sig Start Date End Date Taking? Authorizing Provider   metFORMIN (GLUCOPHAGE) 1,000 mg tablet Take 1,000 mg by mouth two (2) times a day. 3/17/23  Yes Provider, Historical   pregabalin (LYRICA) 75 mg capsule Take 75 mg by mouth two (2) times a day. 2/2/23  Yes Provider, Historical   empagliflozin (JARDIANCE) 10 mg tablet Take 1 Tablet by mouth daily. 2/22/23  Yes Cornelio Cooley NP   flash glucose scanning reader [facility]) misc Use to check glucose 4 times daily 2/22/23  Yes Cornelio Cooley NP   rosuvastatin (CRESTOR) 10 mg tablet Take 1 Tablet by mouth nightly. Indications: high cholesterol and high triglycerides 2/17/23  Yes Cornelio Cooley NP   insulin glargine (LANTUS) 100 unit/mL injection 45 Units by SubCUTAneous route nightly. Caution: Long Acting Insulin. DO NOT HOLD without physician order. DO NOT MIX or dilute with any other insulin. Hold for blood glucose less than 120  Indications: type 2 diabetes mellitus, Hold for blood glucose less than 120 2/15/23  Yes Denny Law NP   DULoxetine (CYMBALTA) 30 mg capsule Take 30 mg by mouth daily.    Yes Other, Phys, MD   OneTouch Delica Plus Lancet 30 gauge misc USE TO CHECK GLUCOSE TWICE DAILY 8/5/22  Yes Provider, Historical   insulin lispro (HUMALOG) 100 unit/mL injection Inject insulin 3 times daily before meals  INITIATE INSULIN CORRECTIVE PROTOCOL:  Very Insulin Resistant  For Blood Sugar (mg/dL) of:              Less than 150 =   0 units  150 -199 =   3 units  200 -249 =   6 units  250 -299 =   9 units  300 -349 =   12 units  350 and above =   15 units and Call Physician  Initiate Hypoglycemic protocol if blood glucose is <70 mg/dL. Fast Acting - Administer Immediately - or within 15 minutes of start of meal, if mealtime coverage. Max daily units 45. Indications: type 2 diabetes mellitus  Patient not taking: Reported on 3/23/2023 2/22/23   Martha Virgen NP   flash glucose sensor (FreeStyle Gustavo Clonts 2 Sensor) kit Use sensor to check glucose 4 times daily 2/22/23   Martha Virgen NP   insulin glargine-yfgn 100 unit/mL soln INJECT 45 UNITS EVERY DAY AT BEDTIME. LONG ACTING INSULIN HOLD FOR BLOOD GLUCOSE LESS THAN 120. DO NOT HOLD WITHOUT PHYSICIAN ORDER. DO NOT MIX OR DILUTE WITH ANY OTHER INSULIN 2/16/23 3/23/23  Provider, Historical        Review of Systems   Constitutional: Negative. HENT:  Negative for congestion, ear pain, hearing loss and sore throat. Eyes: Negative. Negative for blurred vision, photophobia, pain, discharge and redness. Respiratory: Negative. Cardiovascular:  Negative for chest pain, palpitations, orthopnea, claudication and leg swelling. Gastrointestinal:  Negative for abdominal pain, blood in stool, constipation, diarrhea, heartburn, nausea and vomiting. Genitourinary:  Negative for dysuria, flank pain, frequency, hematuria and urgency. Musculoskeletal: Negative. Negative for back pain, falls, joint pain, myalgias and neck pain. Skin:  Negative for itching and rash. Neurological:  Negative for dizziness, tingling, tremors, loss of consciousness, weakness and headaches. Psychiatric/Behavioral:  Negative for depression, hallucinations, substance abuse and suicidal ideas. The patient is not nervous/anxious and does not have insomnia.          Patient Active Problem List   Diagnosis Code    Diabetes mellitus ([facility] E11.9    Hyperlipidemia associated with type 2 diabetes mellitus ([facility] E11.69, E78.5    Mixed hyperlipidemia E78.2 Hyperglycemia due to type 2 diabetes mellitus ([facility] E11.65     Patient Active Problem List    Diagnosis Date Noted    Hyperglycemia due to type 2 diabetes mellitus ([facility] 02/15/2023    Mixed hyperlipidemia 03/29/2022    Hyperlipidemia associated with type 2 diabetes mellitus ([facility] 10/25/2021    Diabetes mellitus ([facility] 12/16/2019     Current Outpatient Medications   Medication Sig Dispense Refill    metFORMIN (GLUCOPHAGE) 1,000 mg tablet Take 1,000 mg by mouth two (2) times a day. pregabalin (LYRICA) 75 mg capsule Take 75 mg by mouth two (2) times a day. empagliflozin (JARDIANCE) 10 mg tablet Take 1 Tablet by mouth daily. 30 Tablet 2    flash glucose scanning reader (FreeStyle Tae 2 [place]) misc Use to check glucose 4 times daily 1 Each 0    rosuvastatin (CRESTOR) 10 mg tablet Take 1 Tablet by mouth nightly. Indications: high cholesterol and high triglycerides 90 Tablet 0    insulin glargine (LANTUS) 100 unit/mL injection 45 Units by SubCUTAneous route nightly. Caution: Long Acting Insulin. DO NOT HOLD without physician order. DO NOT MIX or dilute with any other insulin. Hold for blood glucose less than 120  Indications: type 2 diabetes mellitus, Hold for blood glucose less than 120 1 mL 1    DULoxetine (CYMBALTA) 30 mg capsule Take 30 mg by mouth daily. OneTouch Delica Plus Lancet 30 gauge misc USE TO CHECK GLUCOSE TWICE DAILY      insulin lispro (HUMALOG) 100 unit/mL injection Inject insulin 3 times daily before meals  INITIATE INSULIN CORRECTIVE PROTOCOL:  Very Insulin Resistant  For Blood Sugar (mg/dL) of:              Less than 150 =   0 units  150 -199 =   3 units  200 -249 =   6 units  250 -299 =   9 units  300 -349 =   12 units  350 and above =   15 units and Call Physician  Initiate Hypoglycemic protocol if blood glucose is <70 mg/dL. Fast Acting - Administer Immediately - or within 15 minutes of start of meal, if mealtime coverage. Max daily units 45.   Indications: type 2 diabetes mellitus (Patient not taking: Reported on 3/23/2023) 45 mL 1    flash glucose sensor (FreeStyle Tae 2 Sensor) kit Use sensor to check glucose 4 times daily 2 Kit 11     Allergies   Allergen Reactions    Kiwi Hives     Past Medical History:   Diagnosis Date    Chest discomfort     Diabetes mellitus ([address] 12/16/2019    Type II    Diabetic neuropathy (HCC)     feet    Hyperlipidemia associated with type 2 diabetes mellitus ([address]      Past Surgical History:   Procedure Laterality Date    HX APPENDECTOMY       Family History   Problem Relation Age of Onset    No Known Problems Mother     No Known Problems Father     Diabetes Maternal Grandmother     Diabetes Paternal Grandmother     Hypertension Paternal Grandmother      Social History     Tobacco Use    Smoking status: Some Days     Years: 30.00     Types: Cigarettes     Passive exposure: Current    Smokeless tobacco: Never   Substance Use Topics    Alcohol use: Not Currently          Objective:   Visit Vitals  BP 96/63 (BP 1 Location: Left upper arm, BP Patient Position: Sitting, BP Cuff Size: Large adult)   Pulse 81   Temp 97.4 °F (36.3 °C) (Temporal)   Resp 18   Ht 5' 7\" (1.702 m)   Wt 179 lb 6.4 oz (81.4 kg)   SpO2 98%   BMI 28.10 kg/m²        Physical Exam  Vitals and nursing note reviewed. Constitutional:       Appearance: Normal appearance. He is obese. Cardiovascular:      Rate and Rhythm: Normal rate and regular rhythm. Pulses: Normal pulses. Heart sounds: Normal heart sounds. Pulmonary:      Effort: Pulmonary effort is normal.      Breath sounds: Normal breath sounds. Abdominal:      General: Bowel sounds are normal.      Palpations: Abdomen is soft. Tenderness: There is no abdominal tenderness. There is no guarding. Skin:     General: Skin is warm and dry. Neurological:      Mental Status: He is alert and oriented to person, place, and time. Mental status is at baseline.           We discussed the expected course, resolution and complications of the diagnosis(es) in detail. Medication risks, benefits, costs, interactions, and alternatives were discussed as indicated. I advised him to contact the office if his condition worsens, changes or fails to improve as anticipated. He expressed understanding with the diagnosis(es) and plan.      Gera Jacobs, NP

## 2023-03-23 NOTE — TELEPHONE ENCOUNTER
This was already done 02/23/2023. No response. I will call them again today. LVM asking that records be faxed or if not their patient, to call and let us know. Copy of release and confirmation in media.

## 2023-03-24 LAB
BUN SERPL-MCNC: 16 MG/DL (ref 6–24)
BUN/CREAT SERPL: 25 (ref 9–20)
CALCIUM SERPL-MCNC: 9.6 MG/DL (ref 8.7–10.2)
CHLORIDE SERPL-SCNC: 104 MMOL/L (ref 96–106)
CO2 SERPL-SCNC: 22 MMOL/L (ref 20–29)
CREAT SERPL-MCNC: 0.63 MG/DL (ref 0.76–1.27)
EGFRCR SERPLBLD CKD-EPI 2021: 123 ML/MIN/1.73
GLUCOSE SERPL-MCNC: 85 MG/DL (ref 70–99)
POTASSIUM SERPL-SCNC: 4.3 MMOL/L (ref 3.5–5.2)
SODIUM SERPL-SCNC: 143 MMOL/L (ref 134–144)

## 2023-05-19 DIAGNOSIS — E78.5 HYPERLIPIDEMIA, UNSPECIFIED HYPERLIPIDEMIA TYPE: Primary | ICD-10-CM

## 2023-05-19 RX ORDER — ROSUVASTATIN CALCIUM 10 MG/1
TABLET, COATED ORAL
Qty: 90 TABLET | Refills: 1 | Status: SHIPPED | OUTPATIENT
Start: 2023-05-19

## 2023-05-19 RX ORDER — ROSUVASTATIN CALCIUM 10 MG/1
TABLET, COATED ORAL
COMMUNITY
Start: 2023-02-20 | End: 2023-05-19 | Stop reason: SDUPTHER

## 2023-05-19 RX ORDER — PREGABALIN 75 MG/1
75 CAPSULE ORAL 2 TIMES DAILY
COMMUNITY
Start: 2023-02-02

## 2023-05-19 RX ORDER — INSULIN GLARGINE 100 [IU]/ML
INJECTION, SOLUTION SUBCUTANEOUS
COMMUNITY
Start: 2023-02-22

## 2023-05-19 RX ORDER — INSULIN GLARGINE-YFGN 100 [IU]/ML
40 INJECTION, SOLUTION SUBCUTANEOUS NIGHTLY
COMMUNITY
Start: 2023-05-01

## 2023-05-19 RX ORDER — DULOXETIN HYDROCHLORIDE 30 MG/1
30 CAPSULE, DELAYED RELEASE ORAL DAILY
COMMUNITY
Start: 2023-04-14

## 2023-05-19 RX ORDER — EMPAGLIFLOZIN 10 MG/1
10 TABLET, FILM COATED ORAL DAILY
COMMUNITY
Start: 2023-03-23

## 2024-02-24 ENCOUNTER — HOSPITAL ENCOUNTER (EMERGENCY)
Facility: HOSPITAL | Age: 44
Discharge: HOME | End: 2024-02-24
Attending: STUDENT IN AN ORGANIZED HEALTH CARE EDUCATION/TRAINING PROGRAM
Payer: MEDICAID

## 2024-02-24 VITALS
OXYGEN SATURATION: 98 % | SYSTOLIC BLOOD PRESSURE: 120 MMHG | BODY MASS INDEX: 29.54 KG/M2 | RESPIRATION RATE: 19 BRPM | HEIGHT: 67 IN | WEIGHT: 188.2 LBS | DIASTOLIC BLOOD PRESSURE: 79 MMHG | HEART RATE: 88 BPM | TEMPERATURE: 98.6 F

## 2024-02-24 DIAGNOSIS — R73.9 HYPERGLYCEMIA: Primary | ICD-10-CM

## 2024-02-24 LAB
ALBUMIN SERPL-MCNC: 3.8 G/DL (ref 3.5–5)
ALBUMIN/GLOB SERPL: 0.9 (ref 1.1–2.2)
ALP SERPL-CCNC: 198 U/L (ref 45–117)
ALT SERPL-CCNC: 18 U/L (ref 12–78)
ANION GAP SERPL CALC-SCNC: 10 MMOL/L (ref 5–15)
AST SERPL W P-5'-P-CCNC: 6 U/L (ref 15–37)
BASOPHILS # BLD: 0.1 K/UL (ref 0–0.1)
BASOPHILS NFR BLD: 1 % (ref 0–1)
BILIRUB SERPL-MCNC: 0.6 MG/DL (ref 0.2–1)
BUN SERPL-MCNC: 21 MG/DL (ref 6–20)
BUN/CREAT SERPL: 23 (ref 12–20)
CA-I BLD-MCNC: 8.9 MG/DL (ref 8.5–10.1)
CHLORIDE SERPL-SCNC: 96 MMOL/L (ref 97–108)
CO2 SERPL-SCNC: 27 MMOL/L (ref 21–32)
CREAT SERPL-MCNC: 0.93 MG/DL (ref 0.7–1.3)
DIFFERENTIAL METHOD BLD: ABNORMAL
EOSINOPHIL # BLD: 0.8 K/UL (ref 0–0.4)
EOSINOPHIL NFR BLD: 9 % (ref 0–7)
ERYTHROCYTE [DISTWIDTH] IN BLOOD BY AUTOMATED COUNT: 12.1 % (ref 11.5–14.5)
GLOBULIN SER CALC-MCNC: 4.2 G/DL (ref 2–4)
GLUCOSE BLD STRIP.AUTO-MCNC: 353 MG/DL (ref 65–100)
GLUCOSE BLD STRIP.AUTO-MCNC: 484 MG/DL (ref 65–100)
GLUCOSE SERPL-MCNC: 526 MG/DL (ref 65–100)
HCT VFR BLD AUTO: 37.2 % (ref 36.6–50.3)
HGB BLD-MCNC: 13.4 G/DL (ref 12.1–17)
IMM GRANULOCYTES # BLD AUTO: 0 K/UL (ref 0–0.04)
IMM GRANULOCYTES NFR BLD AUTO: 0 % (ref 0–0.5)
LYMPHOCYTES # BLD: 2.9 K/UL (ref 0.8–3.5)
LYMPHOCYTES NFR BLD: 32 % (ref 12–49)
MCH RBC QN AUTO: 30.2 PG (ref 26–34)
MCHC RBC AUTO-ENTMCNC: 36 G/DL (ref 30–36.5)
MCV RBC AUTO: 84 FL (ref 80–99)
MONOCYTES # BLD: 0.5 K/UL (ref 0–1)
MONOCYTES NFR BLD: 5 % (ref 5–13)
NEUTS SEG # BLD: 4.8 K/UL (ref 1.8–8)
NEUTS SEG NFR BLD: 53 % (ref 32–75)
NRBC # BLD: 0 K/UL (ref 0–0.01)
NRBC BLD-RTO: 0 PER 100 WBC
PERFORMED BY:: ABNORMAL
PERFORMED BY:: ABNORMAL
PLATELET # BLD AUTO: 192 K/UL (ref 150–400)
PMV BLD AUTO: 11 FL (ref 8.9–12.9)
POTASSIUM SERPL-SCNC: 4.1 MMOL/L (ref 3.5–5.1)
PROT SERPL-MCNC: 8 G/DL (ref 6.4–8.2)
RBC # BLD AUTO: 4.43 M/UL (ref 4.1–5.7)
SODIUM SERPL-SCNC: 133 MMOL/L (ref 136–145)
WBC # BLD AUTO: 9 K/UL (ref 4.1–11.1)

## 2024-02-24 PROCEDURE — 6370000000 HC RX 637 (ALT 250 FOR IP): Performed by: STUDENT IN AN ORGANIZED HEALTH CARE EDUCATION/TRAINING PROGRAM

## 2024-02-24 PROCEDURE — 99284 EMERGENCY DEPT VISIT MOD MDM: CPT

## 2024-02-24 PROCEDURE — 85025 COMPLETE CBC W/AUTO DIFF WBC: CPT

## 2024-02-24 PROCEDURE — 80053 COMPREHEN METABOLIC PANEL: CPT

## 2024-02-24 PROCEDURE — 36415 COLL VENOUS BLD VENIPUNCTURE: CPT

## 2024-02-24 PROCEDURE — 2580000003 HC RX 258: Performed by: STUDENT IN AN ORGANIZED HEALTH CARE EDUCATION/TRAINING PROGRAM

## 2024-02-24 PROCEDURE — 96360 HYDRATION IV INFUSION INIT: CPT

## 2024-02-24 PROCEDURE — 82962 GLUCOSE BLOOD TEST: CPT

## 2024-02-24 RX ORDER — INSULIN GLARGINE-YFGN 100 [IU]/ML
40 INJECTION, SOLUTION SUBCUTANEOUS NIGHTLY
Qty: 1 EACH | Refills: 2 | Status: SHIPPED | OUTPATIENT
Start: 2024-02-24

## 2024-02-24 RX ORDER — INSULIN LISPRO 100 [IU]/ML
10 INJECTION, SOLUTION INTRAVENOUS; SUBCUTANEOUS ONCE
Status: COMPLETED | OUTPATIENT
Start: 2024-02-24 | End: 2024-02-24

## 2024-02-24 RX ORDER — SODIUM CHLORIDE, SODIUM LACTATE, POTASSIUM CHLORIDE, AND CALCIUM CHLORIDE .6; .31; .03; .02 G/100ML; G/100ML; G/100ML; G/100ML
2000 INJECTION, SOLUTION INTRAVENOUS
Status: COMPLETED | OUTPATIENT
Start: 2024-02-24 | End: 2024-02-24

## 2024-02-24 RX ADMIN — INSULIN HUMAN 8 UNITS: 100 INJECTION, SOLUTION PARENTERAL at 23:39

## 2024-02-24 RX ADMIN — SODIUM CHLORIDE, POTASSIUM CHLORIDE, SODIUM LACTATE AND CALCIUM CHLORIDE 2000 ML: 600; 310; 30; 20 INJECTION, SOLUTION INTRAVENOUS at 21:59

## 2024-02-24 RX ADMIN — INSULIN LISPRO 10 UNITS: 100 INJECTION, SOLUTION INTRAVENOUS; SUBCUTANEOUS at 21:58

## 2024-02-24 ASSESSMENT — PAIN - FUNCTIONAL ASSESSMENT: PAIN_FUNCTIONAL_ASSESSMENT: NONE - DENIES PAIN

## 2024-02-25 NOTE — ED PROVIDER NOTES
reviewed    Please note that this dictation was completed with [employer], the computer voice recognition software.  Quite often unanticipated grammatical, syntax, homophones, and other interpretive errors are inadvertently transcribed by the computer software.  Please disregard these errors.  Please excuse any errors that have escaped final proofreading.     Adryan Gipson MD  02/24/24 [id number]

## 2024-02-25 NOTE — ED TRIAGE NOTES
Pt states his blood sugar got high yesterday. He has been unable to get it down at home. This morning is was in the 300s, then early afternoon it read high, and then in the evening it was in the 400s. Pt denies any pain/sob at this time. Pt was unable to take medications due to not having any of the syringes at home to use.

## 2024-02-25 NOTE — DISCHARGE INSTR - COC
Continuity of Care Form    Patient Name: Gorge Balderas Jr.   :  1980  MRN:  [id number]    Admit date:  2024  Discharge date:  ***    Code Status Order: No Order   Advance Directives:     Admitting Physician:  No admitting provider for patient encounter.  PCP: Janine Piedra APRN - NP    Discharging Nurse: ***  Discharging Hospital Unit/Room#: ER09/09  Discharging Unit Phone Number: ***    Emergency Contact:   Extended Emergency Contact Information  Primary Emergency Contact: Rachael Jean  Home Phone: [phone]  Relation: Girlfriend  Preferred language: English  Secondary Emergency Contact: Rachael Jean  Home Phone: [phone]  Mobile Phone: [phone]  Relation: Other    Past Surgical History:  Past Surgical History:   Procedure Laterality Date    APPENDECTOMY         Immunization History:     There is no immunization history on file for this patient.    Active Problems:  Patient Active Problem List   Diagnosis Code    Hyperlipidemia associated with type 2 diabetes mellitus (HCC) E11.69, E78.5    Diabetes mellitus (HCC) E11.9    Mixed hyperlipidemia E78.2    Hyperglycemia due to type 2 diabetes mellitus (HCC) E11.65       Isolation/Infection:   Isolation            No Isolation          Patient Infection Status       None to display            Nurse Assessment:  Last Vital Signs: /79   Pulse 88   Temp 98.6 °F (37 °C)   Resp 19   Ht 1.702 m (5' 7\")   Wt 85.4 kg (188 lb 3.2 oz)   SpO2 98%   BMI 29.48 kg/m²     Last documented pain score (0-10 scale):    Last Weight:   Wt Readings from Last 1 Encounters:   24 85.4 kg (188 lb 3.2 oz)     Mental Status:  {IP PT MENTAL STATUS:}    IV Access:  { KRISTY IV ACCESS:798223461}    Nursing Mobility/ADLs:  Walking   {CHP DME ADLs:703996344}  Transfer  {CHP DME ADLs:612113326}  Bathing  {CHP DME ADLs:793737735}  Dressing  {CHP DME ADLs:019532630}  Toileting  {CHP DME ADLs:780666400}  Feeding  {CHP DME ADLs:046381070}  Med

## 2024-02-25 NOTE — ED NOTES
Patient stable at time of discharge. Education and discharge paperwork is reviewed with patient who verbalizes understanding of same. Patient ambulates from ED with family and belongings.

## 2024-02-25 NOTE — DISCHARGE INSTRUCTIONS
Thank you!    Thank you for allowing me to care for you in the emergency department.  I sincerely hope that you are satisfied with your visit today.  It is my goal to provide you with excellent care.    Below you will find a list of your labs and imaging from your visit today if applicable. Should you have any questions regarding these results please do not hesitate to call the emergency department. Please review [facility] Valves for a more detailed result list since the below list may not be comprehensive. Instructions on how to sign up to [email or web address] should be provided in this packet.    Labs -  Recent Results (from the past 12 hour(s))   POCT Glucose    Collection Time: 02/24/24  9:35 PM   Result Value Ref Range    POC Glucose 484 (H) 65 - 100 mg/dL    Performed by: ALVARO ESCOTO    CBC with Auto Differential    Collection Time: 02/24/24  9:38 PM   Result Value Ref Range    WBC 9.0 4.1 - 11.1 K/uL    RBC 4.43 4.10 - 5.70 M/uL    Hemoglobin 13.4 12.1 - 17.0 g/dL    Hematocrit 37.2 36.6 - 50.3 %    MCV 84.0 80.0 - 99.0 FL    MCH 30.2 26.0 - 34.0 PG    MCHC 36.0 30.0 - 36.5 g/dL    RDW 12.1 11.5 - 14.5 %    Platelets 192 150 - 400 K/uL    MPV 11.0 8.9 - 12.9 FL    Nucleated RBCs 0.0 0.0  WBC    nRBC 0.00 0.00 - 0.01 K/uL    Neutrophils % 53 32 - 75 %    Lymphocytes % 32 12 - 49 %    Monocytes % 5 5 - 13 %    Eosinophils % 9 (H) 0 - 7 %    Basophils % 1 0 - 1 %    Immature Granulocytes 0 0 - 0.5 %    Neutrophils Absolute 4.8 1.8 - 8.0 K/UL    Lymphocytes Absolute 2.9 0.8 - 3.5 K/UL    Monocytes Absolute 0.5 0.0 - 1.0 K/UL    Eosinophils Absolute 0.8 (H) 0.0 - 0.4 K/UL    Basophils Absolute 0.1 0.0 - 0.1 K/UL    Absolute Immature Granulocyte 0.0 0.00 - 0.04 K/UL    Differential Type AUTOMATED     Comprehensive Metabolic Panel    Collection Time: 02/24/24  9:38 PM   Result Value Ref Range    Sodium 133 (L) 136 - 145 mmol/L    Potassium 4.1 3.5 - 5.1 mmol/L    Chloride 96 (L) 97 - 108 mmol/L    CO2 27 21 - 32 mmol/L

## 2024-05-07 ENCOUNTER — HOSPITAL ENCOUNTER (INPATIENT)
Facility: HOSPITAL | Age: 44
LOS: 2 days | Discharge: LEFT BEFORE DISCHARGE | DRG: 638 | End: 2024-05-09
Attending: STUDENT IN AN ORGANIZED HEALTH CARE EDUCATION/TRAINING PROGRAM | Admitting: INTERNAL MEDICINE

## 2024-05-07 ENCOUNTER — APPOINTMENT (OUTPATIENT)
Facility: HOSPITAL | Age: 44
DRG: 638 | End: 2024-05-07
Attending: INTERNAL MEDICINE

## 2024-05-07 ENCOUNTER — HOSPITAL ENCOUNTER (EMERGENCY)
Facility: HOSPITAL | Age: 44
Discharge: ANOTHER HOSPITAL | End: 2024-05-07
Attending: EMERGENCY MEDICINE

## 2024-05-07 VITALS
WEIGHT: 169 LBS | OXYGEN SATURATION: 100 % | TEMPERATURE: 98.3 F | RESPIRATION RATE: 17 BRPM | HEIGHT: 68 IN | BODY MASS INDEX: 25.61 KG/M2 | DIASTOLIC BLOOD PRESSURE: 83 MMHG | HEART RATE: 116 BPM | SYSTOLIC BLOOD PRESSURE: 143 MMHG

## 2024-05-07 DIAGNOSIS — E08.10 DIABETIC KETOACIDOSIS WITHOUT COMA ASSOCIATED WITH DIABETES MELLITUS DUE TO UNDERLYING CONDITION (HCC): Primary | ICD-10-CM

## 2024-05-07 DIAGNOSIS — E11.10 DIABETIC KETOACIDOSIS WITHOUT COMA ASSOCIATED WITH TYPE 2 DIABETES MELLITUS (HCC): Primary | ICD-10-CM

## 2024-05-07 PROBLEM — E10.10 DKA, TYPE 1, NOT AT GOAL (HCC): Status: ACTIVE | Noted: 2024-05-07

## 2024-05-07 LAB
ALBUMIN SERPL-MCNC: 4 G/DL (ref 3.5–5)
ALBUMIN/GLOB SERPL: 0.8 (ref 1.1–2.2)
ALP SERPL-CCNC: 240 U/L (ref 45–117)
ALT SERPL-CCNC: 16 U/L (ref 12–78)
ANION GAP SERPL CALC-SCNC: 11 MMOL/L (ref 5–15)
ANION GAP SERPL CALC-SCNC: 12 MMOL/L (ref 5–15)
ANION GAP SERPL CALC-SCNC: 16 MMOL/L (ref 5–15)
ANION GAP SERPL CALC-SCNC: 17 MMOL/L (ref 5–15)
ANION GAP SERPL CALC-SCNC: 26 MMOL/L (ref 5–15)
ARTERIAL PATENCY WRIST A: YES
AST SERPL W P-5'-P-CCNC: 12 U/L (ref 15–37)
BASE DEFICIT BLDA-SCNC: 23.8 MMOL/L
BASOPHILS # BLD: 0.1 K/UL (ref 0–0.1)
BASOPHILS NFR BLD: 1 % (ref 0–1)
BDY SITE: ABNORMAL
BILIRUB SERPL-MCNC: 0.7 MG/DL (ref 0.2–1)
BUN SERPL-MCNC: 11 MG/DL (ref 6–20)
BUN SERPL-MCNC: 17 MG/DL (ref 6–20)
BUN SERPL-MCNC: 7 MG/DL (ref 6–20)
BUN SERPL-MCNC: 8 MG/DL (ref 6–20)
BUN SERPL-MCNC: 9 MG/DL (ref 6–20)
BUN/CREAT SERPL: 12 (ref 12–20)
BUN/CREAT SERPL: 12 (ref 12–20)
BUN/CREAT SERPL: 7 (ref 12–20)
BUN/CREAT SERPL: 8 (ref 12–20)
BUN/CREAT SERPL: 9 (ref 12–20)
CA-I BLD-MCNC: 7.8 MG/DL (ref 8.5–10.1)
CA-I BLD-MCNC: 7.8 MG/DL (ref 8.5–10.1)
CA-I BLD-MCNC: 7.9 MG/DL (ref 8.5–10.1)
CA-I BLD-MCNC: 8.1 MG/DL (ref 8.5–10.1)
CA-I BLD-MCNC: 9 MG/DL (ref 8.5–10.1)
CHLORIDE SERPL-SCNC: 111 MMOL/L (ref 97–108)
CHLORIDE SERPL-SCNC: 112 MMOL/L (ref 97–108)
CHLORIDE SERPL-SCNC: 113 MMOL/L (ref 97–108)
CHLORIDE SERPL-SCNC: 114 MMOL/L (ref 97–108)
CHLORIDE SERPL-SCNC: 95 MMOL/L (ref 97–108)
CO2 SERPL-SCNC: 10 MMOL/L (ref 21–32)
CO2 SERPL-SCNC: 11 MMOL/L (ref 21–32)
CO2 SERPL-SCNC: 12 MMOL/L (ref 21–32)
CO2 SERPL-SCNC: 8 MMOL/L (ref 21–32)
CO2 SERPL-SCNC: 8 MMOL/L (ref 21–32)
COHGB MFR BLD: 0.2 % (ref 1–2)
CREAT SERPL-MCNC: 0.94 MG/DL (ref 0.7–1.3)
CREAT SERPL-MCNC: 0.98 MG/DL (ref 0.7–1.3)
CREAT SERPL-MCNC: 1 MG/DL (ref 0.7–1.3)
CREAT SERPL-MCNC: 1.02 MG/DL (ref 0.7–1.3)
CREAT SERPL-MCNC: 1.44 MG/DL (ref 0.7–1.3)
DIFFERENTIAL METHOD BLD: ABNORMAL
EKG ATRIAL RATE: 104 BPM
EKG DIAGNOSIS: NORMAL
EKG P AXIS: 65 DEGREES
EKG P-R INTERVAL: 143 MS
EKG Q-T INTERVAL: 396 MS
EKG QRS DURATION: 92 MS
EKG QTC CALCULATION (BAZETT): 519 MS
EKG R AXIS: 27 DEGREES
EKG T AXIS: 62 DEGREES
EKG VENTRICULAR RATE: 103 BPM
EOSINOPHIL # BLD: 0.1 K/UL (ref 0–0.4)
EOSINOPHIL NFR BLD: 1 % (ref 0–7)
ERYTHROCYTE [DISTWIDTH] IN BLOOD BY AUTOMATED COUNT: 13.3 % (ref 11.5–14.5)
EST. AVERAGE GLUCOSE BLD GHB EST-MCNC: 335 MG/DL
EST. AVERAGE GLUCOSE BLD GHB EST-MCNC: 346 MG/DL
GLOBULIN SER CALC-MCNC: 4.8 G/DL (ref 2–4)
GLUCOSE BLD STRIP.AUTO-MCNC: 185 MG/DL (ref 65–100)
GLUCOSE BLD STRIP.AUTO-MCNC: 194 MG/DL (ref 65–100)
GLUCOSE BLD STRIP.AUTO-MCNC: 204 MG/DL (ref 65–100)
GLUCOSE BLD STRIP.AUTO-MCNC: 233 MG/DL (ref 65–100)
GLUCOSE BLD STRIP.AUTO-MCNC: 233 MG/DL (ref 65–100)
GLUCOSE BLD STRIP.AUTO-MCNC: 257 MG/DL (ref 65–100)
GLUCOSE BLD STRIP.AUTO-MCNC: 261 MG/DL (ref 65–100)
GLUCOSE BLD STRIP.AUTO-MCNC: 266 MG/DL (ref 65–100)
GLUCOSE BLD STRIP.AUTO-MCNC: 279 MG/DL (ref 65–100)
GLUCOSE BLD STRIP.AUTO-MCNC: 312 MG/DL (ref 65–100)
GLUCOSE BLD STRIP.AUTO-MCNC: 319 MG/DL (ref 65–100)
GLUCOSE BLD STRIP.AUTO-MCNC: 408 MG/DL (ref 65–100)
GLUCOSE BLD STRIP.AUTO-MCNC: 482 MG/DL (ref 65–100)
GLUCOSE SERPL-MCNC: 203 MG/DL (ref 65–100)
GLUCOSE SERPL-MCNC: 214 MG/DL (ref 65–100)
GLUCOSE SERPL-MCNC: 286 MG/DL (ref 65–100)
GLUCOSE SERPL-MCNC: 287 MG/DL (ref 65–100)
GLUCOSE SERPL-MCNC: 549 MG/DL (ref 65–100)
HBA1C MFR BLD: 13.3 % (ref 4–5.6)
HBA1C MFR BLD: 13.7 % (ref 4–5.6)
HCO3 BLDA-SCNC: 5 MMOL/L (ref 22–26)
HCT VFR BLD AUTO: 48.5 % (ref 36.6–50.3)
HGB BLD-MCNC: 16.3 G/DL (ref 12.1–17)
IMM GRANULOCYTES # BLD AUTO: 0.2 K/UL (ref 0–0.04)
IMM GRANULOCYTES NFR BLD AUTO: 2 % (ref 0–0.5)
LYMPHOCYTES # BLD: 2 K/UL (ref 0.8–3.5)
LYMPHOCYTES NFR BLD: 17 % (ref 12–49)
MAGNESIUM SERPL-MCNC: 1.9 MG/DL (ref 1.6–2.4)
MAGNESIUM SERPL-MCNC: 1.9 MG/DL (ref 1.6–2.4)
MAGNESIUM SERPL-MCNC: 2.2 MG/DL (ref 1.6–2.4)
MCH RBC QN AUTO: 29.9 PG (ref 26–34)
MCHC RBC AUTO-ENTMCNC: 33.6 G/DL (ref 30–36.5)
MCV RBC AUTO: 89 FL (ref 80–99)
METHGB MFR BLD: 0.3 % (ref 0–1.4)
MONOCYTES # BLD: 0.2 K/UL (ref 0–1)
MONOCYTES NFR BLD: 2 % (ref 5–13)
NEUTS SEG # BLD: 8.9 K/UL (ref 1.8–8)
NEUTS SEG NFR BLD: 77 % (ref 32–75)
NRBC # BLD: 0.02 K/UL (ref 0–0.01)
NRBC BLD-RTO: 0.2 PER 100 WBC
OXYHGB MFR BLD: 97.4 % (ref 95–99)
PCO2 BLDA: 18 MMHG (ref 35–45)
PERFORMED BY:: ABNORMAL
PH BLDA: 7.05 (ref 7.35–7.45)
PHOSPHATE SERPL-MCNC: 1.4 MG/DL (ref 2.6–4.7)
PHOSPHATE SERPL-MCNC: 1.5 MG/DL (ref 2.6–4.7)
PHOSPHATE SERPL-MCNC: 2.6 MG/DL (ref 2.6–4.7)
PLATELET # BLD AUTO: 246 K/UL (ref 150–400)
PMV BLD AUTO: 11.5 FL (ref 8.9–12.9)
PO2 BLDA: 125 MMHG (ref 80–100)
POTASSIUM SERPL-SCNC: 3.6 MMOL/L (ref 3.5–5.1)
POTASSIUM SERPL-SCNC: 3.7 MMOL/L (ref 3.5–5.1)
POTASSIUM SERPL-SCNC: 4.7 MMOL/L (ref 3.5–5.1)
POTASSIUM SERPL-SCNC: 5.3 MMOL/L (ref 3.5–5.1)
POTASSIUM SERPL-SCNC: 5.3 MMOL/L (ref 3.5–5.1)
PROT SERPL-MCNC: 8.8 G/DL (ref 6.4–8.2)
RBC # BLD AUTO: 5.45 M/UL (ref 4.1–5.7)
RBC MORPH BLD: ABNORMAL
SAO2% DEVICE SAO2% SENSOR NAME: ABNORMAL
SERVICE CMNT-IMP: ABNORMAL
SODIUM SERPL-SCNC: 133 MMOL/L (ref 136–145)
SODIUM SERPL-SCNC: 135 MMOL/L (ref 136–145)
SODIUM SERPL-SCNC: 136 MMOL/L (ref 136–145)
SPECIMEN SITE: ABNORMAL
WBC # BLD AUTO: 11.5 K/UL (ref 4.1–11.1)

## 2024-05-07 PROCEDURE — 36415 COLL VENOUS BLD VENIPUNCTURE: CPT

## 2024-05-07 PROCEDURE — 6370000000 HC RX 637 (ALT 250 FOR IP): Performed by: INTERNAL MEDICINE

## 2024-05-07 PROCEDURE — 2580000003 HC RX 258: Performed by: INTERNAL MEDICINE

## 2024-05-07 PROCEDURE — 83036 HEMOGLOBIN GLYCOSYLATED A1C: CPT

## 2024-05-07 PROCEDURE — 6370000000 HC RX 637 (ALT 250 FOR IP): Performed by: EMERGENCY MEDICINE

## 2024-05-07 PROCEDURE — 99285 EMERGENCY DEPT VISIT HI MDM: CPT

## 2024-05-07 PROCEDURE — 82962 GLUCOSE BLOOD TEST: CPT

## 2024-05-07 PROCEDURE — 2000000000 HC ICU R&B

## 2024-05-07 PROCEDURE — 96365 THER/PROPH/DIAG IV INF INIT: CPT

## 2024-05-07 PROCEDURE — 96361 HYDRATE IV INFUSION ADD-ON: CPT

## 2024-05-07 PROCEDURE — 36600 WITHDRAWAL OF ARTERIAL BLOOD: CPT

## 2024-05-07 PROCEDURE — 93005 ELECTROCARDIOGRAM TRACING: CPT | Performed by: EMERGENCY MEDICINE

## 2024-05-07 PROCEDURE — 96366 THER/PROPH/DIAG IV INF ADDON: CPT

## 2024-05-07 PROCEDURE — 80053 COMPREHEN METABOLIC PANEL: CPT

## 2024-05-07 PROCEDURE — 83735 ASSAY OF MAGNESIUM: CPT

## 2024-05-07 PROCEDURE — 2580000003 HC RX 258: Performed by: STUDENT IN AN ORGANIZED HEALTH CARE EDUCATION/TRAINING PROGRAM

## 2024-05-07 PROCEDURE — 80048 BASIC METABOLIC PNL TOTAL CA: CPT

## 2024-05-07 PROCEDURE — 85025 COMPLETE CBC W/AUTO DIFF WBC: CPT

## 2024-05-07 PROCEDURE — 74176 CT ABD & PELVIS W/O CONTRAST: CPT

## 2024-05-07 PROCEDURE — 82803 BLOOD GASES ANY COMBINATION: CPT

## 2024-05-07 PROCEDURE — 2580000003 HC RX 258: Performed by: EMERGENCY MEDICINE

## 2024-05-07 PROCEDURE — 84100 ASSAY OF PHOSPHORUS: CPT

## 2024-05-07 PROCEDURE — 87641 MR-STAPH DNA AMP PROBE: CPT

## 2024-05-07 RX ORDER — ENOXAPARIN SODIUM 100 MG/ML
40 INJECTION SUBCUTANEOUS DAILY
Status: DISCONTINUED | OUTPATIENT
Start: 2024-05-07 | End: 2024-05-09 | Stop reason: HOSPADM

## 2024-05-07 RX ORDER — 0.9 % SODIUM CHLORIDE 0.9 %
1000 INTRAVENOUS SOLUTION INTRAVENOUS ONCE
Status: COMPLETED | OUTPATIENT
Start: 2024-05-07 | End: 2024-05-07

## 2024-05-07 RX ORDER — 0.9 % SODIUM CHLORIDE 0.9 %
15 INTRAVENOUS SOLUTION INTRAVENOUS ONCE
Status: COMPLETED | OUTPATIENT
Start: 2024-05-07 | End: 2024-05-07

## 2024-05-07 RX ORDER — MAGNESIUM SULFATE IN WATER 40 MG/ML
2000 INJECTION, SOLUTION INTRAVENOUS PRN
Status: DISCONTINUED | OUTPATIENT
Start: 2024-05-07 | End: 2024-05-07 | Stop reason: HOSPADM

## 2024-05-07 RX ORDER — 0.9 % SODIUM CHLORIDE 0.9 %
500 INTRAVENOUS SOLUTION INTRAVENOUS ONCE
Status: DISCONTINUED | OUTPATIENT
Start: 2024-05-07 | End: 2024-05-07

## 2024-05-07 RX ORDER — POLYETHYLENE GLYCOL 3350 17 G/17G
17 POWDER, FOR SOLUTION ORAL DAILY PRN
Status: DISCONTINUED | OUTPATIENT
Start: 2024-05-07 | End: 2024-05-09 | Stop reason: HOSPADM

## 2024-05-07 RX ORDER — DEXTROSE MONOHYDRATE 100 MG/ML
INJECTION, SOLUTION INTRAVENOUS CONTINUOUS PRN
Status: DISCONTINUED | OUTPATIENT
Start: 2024-05-07 | End: 2024-05-09 | Stop reason: HOSPADM

## 2024-05-07 RX ORDER — MAGNESIUM SULFATE IN WATER 40 MG/ML
2000 INJECTION, SOLUTION INTRAVENOUS PRN
Status: DISCONTINUED | OUTPATIENT
Start: 2024-05-07 | End: 2024-05-09 | Stop reason: HOSPADM

## 2024-05-07 RX ORDER — SODIUM CHLORIDE 9 MG/ML
INJECTION, SOLUTION INTRAVENOUS CONTINUOUS
Status: DISCONTINUED | OUTPATIENT
Start: 2024-05-07 | End: 2024-05-07 | Stop reason: HOSPADM

## 2024-05-07 RX ORDER — DEXTROSE AND SODIUM CHLORIDE 5; .45 G/100ML; G/100ML
INJECTION, SOLUTION INTRAVENOUS CONTINUOUS PRN
Status: DISCONTINUED | OUTPATIENT
Start: 2024-05-07 | End: 2024-05-07 | Stop reason: HOSPADM

## 2024-05-07 RX ORDER — POTASSIUM CHLORIDE 7.45 MG/ML
10 INJECTION INTRAVENOUS PRN
Status: DISCONTINUED | OUTPATIENT
Start: 2024-05-07 | End: 2024-05-07 | Stop reason: HOSPADM

## 2024-05-07 RX ORDER — SODIUM CHLORIDE 9 MG/ML
INJECTION, SOLUTION INTRAVENOUS CONTINUOUS
Status: DISCONTINUED | OUTPATIENT
Start: 2024-05-07 | End: 2024-05-09

## 2024-05-07 RX ORDER — BISACODYL 10 MG
10 SUPPOSITORY, RECTAL RECTAL DAILY PRN
Status: DISCONTINUED | OUTPATIENT
Start: 2024-05-07 | End: 2024-05-09 | Stop reason: HOSPADM

## 2024-05-07 RX ORDER — POTASSIUM CHLORIDE 7.45 MG/ML
10 INJECTION INTRAVENOUS PRN
Status: DISCONTINUED | OUTPATIENT
Start: 2024-05-07 | End: 2024-05-09 | Stop reason: HOSPADM

## 2024-05-07 RX ORDER — DEXTROSE AND SODIUM CHLORIDE 5; .45 G/100ML; G/100ML
INJECTION, SOLUTION INTRAVENOUS CONTINUOUS PRN
Status: DISCONTINUED | OUTPATIENT
Start: 2024-05-07 | End: 2024-05-09

## 2024-05-07 RX ORDER — DOCUSATE SODIUM 100 MG/1
100 CAPSULE, LIQUID FILLED ORAL 2 TIMES DAILY
Status: DISCONTINUED | OUTPATIENT
Start: 2024-05-07 | End: 2024-05-09 | Stop reason: HOSPADM

## 2024-05-07 RX ADMIN — SODIUM CHLORIDE 1151 ML: 9 INJECTION, SOLUTION INTRAVENOUS at 11:47

## 2024-05-07 RX ADMIN — DEXTROSE AND SODIUM CHLORIDE: 5; 450 INJECTION, SOLUTION INTRAVENOUS at 19:30

## 2024-05-07 RX ADMIN — INSULIN HUMAN 8 UNITS/HR: 1 INJECTION, SOLUTION INTRAVENOUS at 17:56

## 2024-05-07 RX ADMIN — SODIUM CHLORIDE 1000 ML: 9 INJECTION, SOLUTION INTRAVENOUS at 05:10

## 2024-05-07 RX ADMIN — DOCUSATE SODIUM 100 MG: 100 CAPSULE, LIQUID FILLED ORAL at 19:29

## 2024-05-07 RX ADMIN — SODIUM CHLORIDE: 9 INJECTION, SOLUTION INTRAVENOUS at 17:03

## 2024-05-07 RX ADMIN — SODIUM CHLORIDE 1000 ML: 9 INJECTION, SOLUTION INTRAVENOUS at 07:42

## 2024-05-07 RX ADMIN — INSULIN HUMAN 7 UNITS/HR: 1 INJECTION, SOLUTION INTRAVENOUS at 06:47

## 2024-05-07 RX ADMIN — SODIUM CHLORIDE: 9 INJECTION, SOLUTION INTRAVENOUS at 11:47

## 2024-05-07 RX ADMIN — INSULIN HUMAN 8.7 UNITS/HR: 1 INJECTION, SOLUTION INTRAVENOUS at 19:25

## 2024-05-07 RX ADMIN — SODIUM CHLORIDE 1151 ML: 9 INJECTION, SOLUTION INTRAVENOUS at 06:35

## 2024-05-07 ASSESSMENT — PAIN - FUNCTIONAL ASSESSMENT
PAIN_FUNCTIONAL_ASSESSMENT: NONE - DENIES PAIN
PAIN_FUNCTIONAL_ASSESSMENT: NONE - DENIES PAIN

## 2024-05-07 ASSESSMENT — ENCOUNTER SYMPTOMS
RESPIRATORY NEGATIVE: 1
EYES NEGATIVE: 1
CONSTIPATION: 1

## 2024-05-07 ASSESSMENT — LIFESTYLE VARIABLES
HOW MANY STANDARD DRINKS CONTAINING ALCOHOL DO YOU HAVE ON A TYPICAL DAY: PATIENT DOES NOT DRINK
HOW OFTEN DO YOU HAVE A DRINK CONTAINING ALCOHOL: NEVER

## 2024-05-07 NOTE — ED NOTES
Very pleasant and talkative. Spoke with MD- MD requested another Liter of NS w/o now. Pt is sitting up eating ice chips. RR even and nonlabored Sinus tach 108

## 2024-05-07 NOTE — ED NOTES
[facility] IV pump has insulin going at 2.5mL/hr but last documented rate at [facility] ED was 7mL/hr. Will recheck BG and adjust.

## 2024-05-07 NOTE — ED NOTES
[facility] EMERGENCY DEPT  EMERGENCY DEPARTMENT ENCOUNTER      Pt Name: Gorge Balderas Jr.  MRN: [id number]  Birthdate 1980  Date of evaluation: 5/7/2024  Provider: Jarrett Aceves MD  5:46 AM    CHIEF COMPLAINT       Chief Complaint   Patient presents with    Hyperglycemia         HISTORY OF PRESENT ILLNESS    Gorge Balderas Jr. is a 43 y.o. male with history of diabetes who presents to the emergency department with complaint of dry mouth, thirst and high blood sugar.  He has been fill his insulin prescription due to lack of funds.      Nursing Notes were reviewed.    REVIEW OF SYSTEMS       Review of Systems   Constitutional: Negative.    HENT: Negative.     Eyes: Negative.    Respiratory: Negative.     Cardiovascular: Negative.    Gastrointestinal:  Positive for constipation.   Endocrine: Positive for polyuria.        Thirst   Neurological:  Positive for dizziness.   Hematological: Negative.        Except as noted above the remainder of the review of systems was reviewed and negative.       PAST MEDICAL HISTORY     Past Medical History:   Diagnosis Date    Chest discomfort     Diabetes mellitus (HCC) 12/16/2019    Type II    Diabetic neuropathy (HCC)     feet    Hyperlipidemia associated with type 2 diabetes mellitus (HCC)          SURGICAL HISTORY       Past Surgical History:   Procedure Laterality Date    APPENDECTOMY           CURRENT MEDICATIONS       Previous Medications    CONTINUOUS BLOOD GLUC  (FREESTYLE POLLO 2 READER) BRENDEN    TO CHECK GLUCOSE 4 TIMES DAILY    CONTINUOUS BLOOD GLUC SENSOR (FREESTYLE POLLO 2 SENSOR) [facility]    USE SENSOR FOUR TIMES DAILY    INSULIN GLARGINE-YFGN (SEMGLEE-YFGN) 100 UNIT/ML INJECTION VIAL    Inject 40 Units into the skin nightly    LANTUS 100 UNIT/ML INJECTION VIAL    INJECT 45 UNITS EVERY DAY AT BEDTIME. LONG ACTING INSULIN HOLD FOR BLOOD GLUCOSE LESS THAN 120. DO NOT HOLD WITHOUT PHYSICIAN ORDER. DO NOT MIX OR DILUTE WITH ANY OTHER INSULIN       ALLERGIES     Kiwi

## 2024-05-07 NOTE — ED NOTES
Prior to pt leaving with [facility]- pt's blood sugar was checked rate adjusted to 2.6 cc/hr based on the  insulin gtt order placed by MD- Pharmacist in ED to check behind nurses

## 2024-05-07 NOTE — ED TRIAGE NOTES
Pt states he's been out of his Lantus x2 weeks. C/o constipation x3 days. Pt states he's having increased SOB with dizziness and dry mouth. Glucose 463 in triage

## 2024-05-07 NOTE — ED TRIAGE NOTES
Pt arrives to ED by EMS from [facility] ED for DKA. Pt arrives on insulin going at 2.6mL/hr.

## 2024-05-07 NOTE — ED PROVIDER NOTES
(has no administration in time range)   dextrose 10 % infusion (has no administration in time range)   insulin regular (MYXREDLIN) 100 units in sodium chloride 0.9 % 100 ml infusion (5 Units/hr IntraVENous Rate/Dose Change 5/7/24 6598)       CONSULTS: See ED Course/MDM for further details.  None     Social Determinants affecting Diagnosis/Treatment: None    Smoking Cessation: Not Applicable    PROCEDURES   Unless otherwise noted above, none  Procedures      CRITICAL CARE TIME   CRITICAL CARE NOTE :    1:22 PM    IMPENDING DETERIORATION -Metabolic  ASSOCIATED RISK FACTORS - Metabolic changes  MANAGEMENT- Bedside Assessment and Supervision of Care  INTERPRETATION -  Blood Gases and Blood Pressure  INTERVENTIONS - Metabolic interventions  CASE REVIEW - Hospitalist/Intensivist and Nursing  TREATMENT RESPONSE -Stable  PERFORMED BY - Self    NOTES:  I have spent 25 minutes of critical care time involved in lab review, consultations with specialist, family decision- making, bedside attention and documentation. Time is exclusive of EKG interpretation, imaging interpretation and separately billed procedures.  During this entire length of time I was immediately available to the patient.  Tim Rodriguez MD    ED IMPRESSION     1. Diabetic ketoacidosis without coma associated with type 2 diabetes mellitus (HCC)          DISPOSITION/PLAN   DISPOSITION Decision To Admit 05/07/2024 09:42:09 AM    Admit Note: Pt is being admitted by medical ICU. The results of their tests and reason(s) for their admission have been discussed with pt and/or available family. They convey agreement and understanding for the need to be admitted and for the admission diagnosis.     PATIENT REFERRED TO:  No follow-up provider specified.      DISCHARGE MEDICATIONS:     Medication List        CONTINUE taking these medications      FreeStyle Laura 2 [place] Gayatri     FreeStyle Laura 2 Sensor Misc            ASK your doctor about these medications

## 2024-05-07 NOTE — H&P
03/23/23 81.4 kg (179 lb 6.4 oz)   02/22/23 80.5 kg (177 lb 6.4 oz)   02/12/23 71.7 kg (158 lb)   06/29/22 70.6 kg (155 lb 11.2 oz)   05/20/22 75.4 kg (166 lb 3.2 oz)   05/19/22 78.5 kg (173 lb)   03/29/22 78.9 kg (174 lb)         PHYSICAL EXAM:  General:    Alert, cooperative, appears stated age.     Lungs:   CTA b/l.  No wheezing or Rhonchi. No rales.  Chest wall:  No tenderness.  No accessory muscle use.  Heart:   Regular  rhythm,  No  Murmur.   No edema  Abdomen:   Soft, NT. ND  BS+  Extremities: No cyanosis.  No clubbing,      Skin turgor normal, Radial dial pulse 2+. Capillary refill normal  Neurologic: No facial asymmetry. No aphasia or slurred speech. Symmetrical strength, Sensation grossly intact. AAOx4.         LAB DATA REVIEWED:    Recent Results (from the past 12 hour(s))   EKG 12 Lead    Collection Time: 05/07/24  4:53 AM   Result Value Ref Range    Ventricular Rate 103 BPM    Atrial Rate 104 BPM    P-R Interval 143 ms    QRS Duration 92 ms    Q-T Interval 396 ms    QTc Calculation (Bazett) 519 ms    P Axis 65 degrees    R Axis 27 degrees    T Axis 62 degrees    Diagnosis       Sinus tachycardia  ST elev, probable normal early repol pattern  Prolonged QT interval    Confirmed by Alan Davison ([id number]) on 5/7/2024 9:31:14 AM     POCT Glucose    Collection Time: 05/07/24  4:53 AM   Result Value Ref Range    POC Glucose 482 (H) 65 - 100 mg/dL    Performed by: DAYSI MIRAMONTES    CBC with Auto Differential    Collection Time: 05/07/24  5:17 AM   Result Value Ref Range    WBC 11.5 (H) 4.1 - 11.1 K/uL    RBC 5.45 4.10 - 5.70 M/uL    Hemoglobin 16.3 12.1 - 17.0 g/dL    Hematocrit 48.5 36.6 - 50.3 %    MCV 89.0 80.0 - 99.0 FL    MCH 29.9 26.0 - 34.0 PG    MCHC 33.6 30.0 - 36.5 g/dL    RDW 13.3 11.5 - 14.5 %    Platelets 246 150 - 400 K/uL    MPV 11.5 8.9 - 12.9 FL    Nucleated RBCs 0.2 (H) 0.0  WBC    nRBC 0.02 (H) 0.00 - 0.01 K/uL    Neutrophils % 77 (H) 32 - 75 %    Lymphocytes % 17 12 - 49 %

## 2024-05-08 LAB
ANION GAP SERPL CALC-SCNC: 6 MMOL/L (ref 5–15)
ANION GAP SERPL CALC-SCNC: 6 MMOL/L (ref 5–15)
ANION GAP SERPL CALC-SCNC: 7 MMOL/L (ref 5–15)
ANION GAP SERPL CALC-SCNC: 7 MMOL/L (ref 5–15)
ANION GAP SERPL CALC-SCNC: 9 MMOL/L (ref 5–15)
ANION GAP SERPL CALC-SCNC: 9 MMOL/L (ref 5–15)
BUN SERPL-MCNC: 5 MG/DL (ref 6–20)
BUN SERPL-MCNC: 5 MG/DL (ref 6–20)
BUN SERPL-MCNC: 6 MG/DL (ref 6–20)
BUN SERPL-MCNC: 7 MG/DL (ref 6–20)
BUN/CREAT SERPL: 6 (ref 12–20)
BUN/CREAT SERPL: 7 (ref 12–20)
BUN/CREAT SERPL: 7 (ref 12–20)
BUN/CREAT SERPL: 8 (ref 12–20)
BUN/CREAT SERPL: 8 (ref 12–20)
BUN/CREAT SERPL: 9 (ref 12–20)
CA-I BLD-MCNC: 7.8 MG/DL (ref 8.5–10.1)
CA-I BLD-MCNC: 7.9 MG/DL (ref 8.5–10.1)
CA-I BLD-MCNC: 8 MG/DL (ref 8.5–10.1)
CHLORIDE SERPL-SCNC: 109 MMOL/L (ref 97–108)
CHLORIDE SERPL-SCNC: 109 MMOL/L (ref 97–108)
CHLORIDE SERPL-SCNC: 110 MMOL/L (ref 97–108)
CHLORIDE SERPL-SCNC: 110 MMOL/L (ref 97–108)
CHLORIDE SERPL-SCNC: 113 MMOL/L (ref 97–108)
CHLORIDE SERPL-SCNC: 113 MMOL/L (ref 97–108)
CO2 SERPL-SCNC: 14 MMOL/L (ref 21–32)
CO2 SERPL-SCNC: 17 MMOL/L (ref 21–32)
CO2 SERPL-SCNC: 18 MMOL/L (ref 21–32)
CO2 SERPL-SCNC: 18 MMOL/L (ref 21–32)
CO2 SERPL-SCNC: 20 MMOL/L (ref 21–32)
CO2 SERPL-SCNC: 21 MMOL/L (ref 21–32)
CREAT SERPL-MCNC: 0.66 MG/DL (ref 0.7–1.3)
CREAT SERPL-MCNC: 0.67 MG/DL (ref 0.7–1.3)
CREAT SERPL-MCNC: 0.74 MG/DL (ref 0.7–1.3)
CREAT SERPL-MCNC: 0.77 MG/DL (ref 0.7–1.3)
CREAT SERPL-MCNC: 0.87 MG/DL (ref 0.7–1.3)
CREAT SERPL-MCNC: 0.91 MG/DL (ref 0.7–1.3)
EST. AVERAGE GLUCOSE BLD GHB EST-MCNC: 312 MG/DL
GLUCOSE BLD STRIP.AUTO-MCNC: 136 MG/DL (ref 65–100)
GLUCOSE BLD STRIP.AUTO-MCNC: 142 MG/DL (ref 65–100)
GLUCOSE BLD STRIP.AUTO-MCNC: 148 MG/DL (ref 65–100)
GLUCOSE BLD STRIP.AUTO-MCNC: 150 MG/DL (ref 65–100)
GLUCOSE BLD STRIP.AUTO-MCNC: 151 MG/DL (ref 65–100)
GLUCOSE BLD STRIP.AUTO-MCNC: 152 MG/DL (ref 65–100)
GLUCOSE BLD STRIP.AUTO-MCNC: 153 MG/DL (ref 65–100)
GLUCOSE BLD STRIP.AUTO-MCNC: 154 MG/DL (ref 65–100)
GLUCOSE BLD STRIP.AUTO-MCNC: 159 MG/DL (ref 65–100)
GLUCOSE BLD STRIP.AUTO-MCNC: 161 MG/DL (ref 65–100)
GLUCOSE BLD STRIP.AUTO-MCNC: 162 MG/DL (ref 65–100)
GLUCOSE BLD STRIP.AUTO-MCNC: 166 MG/DL (ref 65–100)
GLUCOSE BLD STRIP.AUTO-MCNC: 178 MG/DL (ref 65–100)
GLUCOSE BLD STRIP.AUTO-MCNC: 181 MG/DL (ref 65–100)
GLUCOSE BLD STRIP.AUTO-MCNC: 192 MG/DL (ref 65–100)
GLUCOSE BLD STRIP.AUTO-MCNC: 198 MG/DL (ref 65–100)
GLUCOSE BLD STRIP.AUTO-MCNC: 202 MG/DL (ref 65–100)
GLUCOSE BLD STRIP.AUTO-MCNC: 212 MG/DL (ref 65–100)
GLUCOSE BLD STRIP.AUTO-MCNC: 250 MG/DL (ref 65–100)
GLUCOSE BLD STRIP.AUTO-MCNC: 256 MG/DL (ref 65–100)
GLUCOSE BLD STRIP.AUTO-MCNC: 294 MG/DL (ref 65–100)
GLUCOSE SERPL-MCNC: 138 MG/DL (ref 65–100)
GLUCOSE SERPL-MCNC: 148 MG/DL (ref 65–100)
GLUCOSE SERPL-MCNC: 180 MG/DL (ref 65–100)
GLUCOSE SERPL-MCNC: 228 MG/DL (ref 65–100)
GLUCOSE SERPL-MCNC: 247 MG/DL (ref 65–100)
GLUCOSE SERPL-MCNC: 271 MG/DL (ref 65–100)
HBA1C MFR BLD: 12.5 % (ref 4–5.6)
MAGNESIUM SERPL-MCNC: 1.7 MG/DL (ref 1.6–2.4)
MAGNESIUM SERPL-MCNC: 1.8 MG/DL (ref 1.6–2.4)
MAGNESIUM SERPL-MCNC: 1.9 MG/DL (ref 1.6–2.4)
MRSA DNA SPEC QL NAA+PROBE: NOT DETECTED
PERFORMED BY:: ABNORMAL
PHOSPHATE SERPL-MCNC: 1.2 MG/DL (ref 2.6–4.7)
PHOSPHATE SERPL-MCNC: 1.2 MG/DL (ref 2.6–4.7)
PHOSPHATE SERPL-MCNC: 1.3 MG/DL (ref 2.6–4.7)
PHOSPHATE SERPL-MCNC: 1.6 MG/DL (ref 2.6–4.7)
PHOSPHATE SERPL-MCNC: 1.7 MG/DL (ref 2.6–4.7)
PHOSPHATE SERPL-MCNC: 1.8 MG/DL (ref 2.6–4.7)
POTASSIUM SERPL-SCNC: 2.7 MMOL/L (ref 3.5–5.1)
POTASSIUM SERPL-SCNC: 2.8 MMOL/L (ref 3.5–5.1)
POTASSIUM SERPL-SCNC: 2.9 MMOL/L (ref 3.5–5.1)
POTASSIUM SERPL-SCNC: 3.3 MMOL/L (ref 3.5–5.1)
POTASSIUM SERPL-SCNC: 3.5 MMOL/L (ref 3.5–5.1)
POTASSIUM SERPL-SCNC: 3.5 MMOL/L (ref 3.5–5.1)
POTASSIUM SERPL-SCNC: 3.6 MMOL/L (ref 3.5–5.1)
SODIUM SERPL-SCNC: 134 MMOL/L (ref 136–145)
SODIUM SERPL-SCNC: 136 MMOL/L (ref 136–145)
SODIUM SERPL-SCNC: 137 MMOL/L (ref 136–145)
SODIUM SERPL-SCNC: 137 MMOL/L (ref 136–145)

## 2024-05-08 PROCEDURE — 2500000003 HC RX 250 WO HCPCS: Performed by: INTERNAL MEDICINE

## 2024-05-08 PROCEDURE — 2000000000 HC ICU R&B

## 2024-05-08 PROCEDURE — 80048 BASIC METABOLIC PNL TOTAL CA: CPT

## 2024-05-08 PROCEDURE — 84132 ASSAY OF SERUM POTASSIUM: CPT

## 2024-05-08 PROCEDURE — 6370000000 HC RX 637 (ALT 250 FOR IP): Performed by: INTERNAL MEDICINE

## 2024-05-08 PROCEDURE — 2580000003 HC RX 258: Performed by: INTERNAL MEDICINE

## 2024-05-08 PROCEDURE — 82962 GLUCOSE BLOOD TEST: CPT

## 2024-05-08 PROCEDURE — 83735 ASSAY OF MAGNESIUM: CPT

## 2024-05-08 PROCEDURE — 84100 ASSAY OF PHOSPHORUS: CPT

## 2024-05-08 PROCEDURE — 6360000002 HC RX W HCPCS: Performed by: INTERNAL MEDICINE

## 2024-05-08 RX ADMIN — DOCUSATE SODIUM 100 MG: 100 CAPSULE, LIQUID FILLED ORAL at 20:11

## 2024-05-08 RX ADMIN — POTASSIUM CHLORIDE 10 MEQ: 7.46 INJECTION, SOLUTION INTRAVENOUS at 03:12

## 2024-05-08 RX ADMIN — POTASSIUM CHLORIDE 10 MEQ: 7.46 INJECTION, SOLUTION INTRAVENOUS at 23:08

## 2024-05-08 RX ADMIN — DEXTROSE AND SODIUM CHLORIDE: 5; 450 INJECTION, SOLUTION INTRAVENOUS at 03:07

## 2024-05-08 RX ADMIN — DEXTROSE AND SODIUM CHLORIDE: 5; 450 INJECTION, SOLUTION INTRAVENOUS at 17:57

## 2024-05-08 RX ADMIN — DEXTROSE AND SODIUM CHLORIDE: 5; 450 INJECTION, SOLUTION INTRAVENOUS at 10:23

## 2024-05-08 RX ADMIN — INSULIN HUMAN 8 UNITS/HR: 1 INJECTION, SOLUTION INTRAVENOUS at 20:56

## 2024-05-08 RX ADMIN — DOCUSATE SODIUM 100 MG: 100 CAPSULE, LIQUID FILLED ORAL at 08:58

## 2024-05-08 RX ADMIN — POTASSIUM CHLORIDE 10 MEQ: 7.46 INJECTION, SOLUTION INTRAVENOUS at 20:55

## 2024-05-08 RX ADMIN — POTASSIUM CHLORIDE 10 MEQ: 7.46 INJECTION, SOLUTION INTRAVENOUS at 21:59

## 2024-05-08 RX ADMIN — INSULIN HUMAN 6.1 UNITS/HR: 1 INJECTION, SOLUTION INTRAVENOUS at 02:15

## 2024-05-08 RX ADMIN — SODIUM PHOSPHATE, MONOBASIC, MONOHYDRATE AND SODIUM PHOSPHATE, DIBASIC, ANHYDROUS 15 MMOL: 142; 276 INJECTION, SOLUTION INTRAVENOUS at 05:38

## 2024-05-08 RX ADMIN — POTASSIUM CHLORIDE 10 MEQ: 7.46 INJECTION, SOLUTION INTRAVENOUS at 04:27

## 2024-05-08 RX ADMIN — POTASSIUM CHLORIDE 10 MEQ: 7.46 INJECTION, SOLUTION INTRAVENOUS at 02:22

## 2024-05-08 ASSESSMENT — PAIN SCALES - GENERAL: PAINLEVEL_OUTOF10: 0

## 2024-05-08 NOTE — PLAN OF CARE
Problem: Safety - Adult  Goal: Free from fall injury  Outcome: Progressing  Flowsheets (Taken 5/8/2024 0000)  Free From Fall Injury:   Instruct family/caregiver on patient safety   Based on caregiver fall risk screen, instruct family/caregiver to ask for assistance with transferring infant if caregiver noted to have fall risk factors     Problem: Neurosensory - Adult  Goal: Achieves stable or improved neurological status  Outcome: Progressing  Flowsheets (Taken 5/8/2024 0000)  Achieves stable or improved neurological status:   Maintain blood pressure and fluid volume within ordered parameters to optimize cerebral perfusion and minimize risk of hemorrhage   Monitor temperature, glucose, and sodium. Initiate appropriate interventions as ordered   Assess for and report changes in neurological status  Goal: Achieves maximal functionality and self care  Outcome: Progressing  Flowsheets (Taken 5/8/2024 0138)  Achieves maximal functionality and self care:   Monitor swallowing and airway patency with patient fatigue and changes in neurological status   Encourage and assist patient to increase activity and self care with guidance from physical therapy/occupational therapy     Problem: Skin/Tissue Integrity - Adult  Goal: Skin integrity remains intact  Outcome: Progressing  Flowsheets (Taken 5/8/2024 0138)  Skin Integrity Remains Intact:   Monitor for areas of redness and/or skin breakdown   Assess vascular access sites hourly     Problem: Metabolic/Fluid and Electrolytes - Adult  Goal: Glucose maintained within prescribed range  Outcome: Progressing  Flowsheets (Taken 5/8/2024 0138)  Glucose maintained within prescribed range:   Monitor blood glucose as ordered   Assess barriers to adequate nutritional intake and initiate nutrition consult as needed   Assess for signs and symptoms of hyperglycemia and hypoglycemia   Administer ordered medications to maintain glucose within target range   Instruct patient on self management

## 2024-05-08 NOTE — PLAN OF CARE
Problem: Safety - Adult  Goal: Free from fall injury  5/8/2024 1213 by Genoveva De La Cruz, RN  Outcome: Progressing  5/8/2024 0138 by Rosalind Mendoza, RN  Outcome: Progressing  Flowsheets (Taken 5/8/2024 0000)  Free From Fall Injury:   Instruct family/caregiver on patient safety   Based on caregiver fall risk screen, instruct family/caregiver to ask for assistance with transferring infant if caregiver noted to have fall risk factors     Problem: Discharge Planning  Goal: Discharge to home or other facility with appropriate resources  Outcome: Progressing  Flowsheets (Taken 5/8/2024 0700)  Discharge to home or other facility with appropriate resources:   Identify barriers to discharge with patient and caregiver   Arrange for needed discharge resources and transportation as appropriate   Identify discharge learning needs (meds, wound care, etc)   Refer to discharge planning if patient needs post-hospital services based on physician order or complex needs related to functional status, cognitive ability or social support system     Problem: Chronic Conditions and Co-morbidities  Goal: Patient's chronic conditions and co-morbidity symptoms are monitored and maintained or improved  Outcome: Progressing  Flowsheets (Taken 5/8/2024 0700)  Care Plan - Patient's Chronic Conditions and Co-Morbidity Symptoms are Monitored and Maintained or Improved:   Monitor and assess patient's chronic conditions and comorbid symptoms for stability, deterioration, or improvement   Collaborate with multidisciplinary team to address chronic and comorbid conditions and prevent exacerbation or deterioration   Update acute care plan with appropriate goals if chronic or comorbid symptoms are exacerbated and prevent overall improvement and discharge     Problem: Neurosensory - Adult  Goal: Achieves stable or improved neurological status  5/8/2024 1213 by Genoveva De La Cruz, RN  Outcome: Progressing  Flowsheets (Taken 5/8/2024 0700)  Achieves stable or improved

## 2024-05-08 NOTE — ED NOTES
2104- attempted to call report- nurse doing a compassionate extubation.    2211- attempted to call report- nurse \"dealing with a death\"

## 2024-05-09 VITALS
RESPIRATION RATE: 12 BRPM | BODY MASS INDEX: 27.46 KG/M2 | HEIGHT: 66 IN | HEART RATE: 81 BPM | TEMPERATURE: 98.1 F | OXYGEN SATURATION: 98 % | SYSTOLIC BLOOD PRESSURE: 135 MMHG | WEIGHT: 170.86 LBS | DIASTOLIC BLOOD PRESSURE: 74 MMHG

## 2024-05-09 LAB
ALBUMIN SERPL-MCNC: 2.8 G/DL (ref 3.5–5)
ANION GAP SERPL CALC-SCNC: 6 MMOL/L (ref 5–15)
BUN SERPL-MCNC: 4 MG/DL (ref 6–20)
BUN SERPL-MCNC: 6 MG/DL (ref 6–20)
BUN SERPL-MCNC: 7 MG/DL (ref 6–20)
BUN/CREAT SERPL: 10 (ref 12–20)
BUN/CREAT SERPL: 10 (ref 12–20)
BUN/CREAT SERPL: 7 (ref 12–20)
CA-I BLD-MCNC: 8 MG/DL (ref 8.5–10.1)
CA-I BLD-MCNC: 8.1 MG/DL (ref 8.5–10.1)
CA-I BLD-MCNC: 8.3 MG/DL (ref 8.5–10.1)
CHLORIDE SERPL-SCNC: 108 MMOL/L (ref 97–108)
CHLORIDE SERPL-SCNC: 110 MMOL/L (ref 97–108)
CHLORIDE SERPL-SCNC: 113 MMOL/L (ref 97–108)
CO2 SERPL-SCNC: 20 MMOL/L (ref 21–32)
CO2 SERPL-SCNC: 23 MMOL/L (ref 21–32)
CO2 SERPL-SCNC: 25 MMOL/L (ref 21–32)
CREAT SERPL-MCNC: 0.57 MG/DL (ref 0.7–1.3)
CREAT SERPL-MCNC: 0.61 MG/DL (ref 0.7–1.3)
CREAT SERPL-MCNC: 0.71 MG/DL (ref 0.7–1.3)
GLUCOSE BLD STRIP.AUTO-MCNC: 102 MG/DL (ref 65–100)
GLUCOSE BLD STRIP.AUTO-MCNC: 128 MG/DL (ref 65–100)
GLUCOSE BLD STRIP.AUTO-MCNC: 138 MG/DL (ref 65–100)
GLUCOSE BLD STRIP.AUTO-MCNC: 140 MG/DL (ref 65–100)
GLUCOSE BLD STRIP.AUTO-MCNC: 151 MG/DL (ref 65–100)
GLUCOSE BLD STRIP.AUTO-MCNC: 166 MG/DL (ref 65–100)
GLUCOSE BLD STRIP.AUTO-MCNC: 173 MG/DL (ref 65–100)
GLUCOSE BLD STRIP.AUTO-MCNC: 175 MG/DL (ref 65–100)
GLUCOSE BLD STRIP.AUTO-MCNC: 182 MG/DL (ref 65–100)
GLUCOSE BLD STRIP.AUTO-MCNC: 211 MG/DL (ref 65–100)
GLUCOSE BLD STRIP.AUTO-MCNC: 224 MG/DL (ref 65–100)
GLUCOSE BLD STRIP.AUTO-MCNC: 225 MG/DL (ref 65–100)
GLUCOSE BLD STRIP.AUTO-MCNC: 226 MG/DL (ref 65–100)
GLUCOSE BLD STRIP.AUTO-MCNC: 303 MG/DL (ref 65–100)
GLUCOSE SERPL-MCNC: 132 MG/DL (ref 65–100)
GLUCOSE SERPL-MCNC: 188 MG/DL (ref 65–100)
GLUCOSE SERPL-MCNC: 338 MG/DL (ref 65–100)
MAGNESIUM SERPL-MCNC: 1.7 MG/DL (ref 1.6–2.4)
MAGNESIUM SERPL-MCNC: 1.8 MG/DL (ref 1.6–2.4)
MAGNESIUM SERPL-MCNC: 2 MG/DL (ref 1.6–2.4)
PERFORMED BY:: ABNORMAL
PHOSPHATE SERPL-MCNC: 1.4 MG/DL (ref 2.6–4.7)
PHOSPHATE SERPL-MCNC: 2.3 MG/DL (ref 2.6–4.7)
PHOSPHATE SERPL-MCNC: 2.8 MG/DL (ref 2.6–4.7)
POTASSIUM SERPL-SCNC: 2.8 MMOL/L (ref 3.5–5.1)
POTASSIUM SERPL-SCNC: 2.8 MMOL/L (ref 3.5–5.1)
POTASSIUM SERPL-SCNC: 3.7 MMOL/L (ref 3.5–5.1)
SODIUM SERPL-SCNC: 136 MMOL/L (ref 136–145)
SODIUM SERPL-SCNC: 139 MMOL/L (ref 136–145)
SODIUM SERPL-SCNC: 142 MMOL/L (ref 136–145)

## 2024-05-09 PROCEDURE — 80069 RENAL FUNCTION PANEL: CPT

## 2024-05-09 PROCEDURE — 6370000000 HC RX 637 (ALT 250 FOR IP): Performed by: INTERNAL MEDICINE

## 2024-05-09 PROCEDURE — 80048 BASIC METABOLIC PNL TOTAL CA: CPT

## 2024-05-09 PROCEDURE — 36415 COLL VENOUS BLD VENIPUNCTURE: CPT

## 2024-05-09 PROCEDURE — 6360000002 HC RX W HCPCS: Performed by: INTERNAL MEDICINE

## 2024-05-09 PROCEDURE — 83735 ASSAY OF MAGNESIUM: CPT

## 2024-05-09 PROCEDURE — 6370000000 HC RX 637 (ALT 250 FOR IP): Performed by: HOSPITALIST

## 2024-05-09 PROCEDURE — 2580000003 HC RX 258: Performed by: INTERNAL MEDICINE

## 2024-05-09 PROCEDURE — 2500000003 HC RX 250 WO HCPCS: Performed by: INTERNAL MEDICINE

## 2024-05-09 PROCEDURE — 84100 ASSAY OF PHOSPHORUS: CPT

## 2024-05-09 PROCEDURE — 82962 GLUCOSE BLOOD TEST: CPT

## 2024-05-09 RX ORDER — INSULIN LISPRO 100 [IU]/ML
5 INJECTION, SOLUTION INTRAVENOUS; SUBCUTANEOUS
Status: DISCONTINUED | OUTPATIENT
Start: 2024-05-09 | End: 2024-05-09 | Stop reason: HOSPADM

## 2024-05-09 RX ORDER — INSULIN LISPRO 100 [IU]/ML
0-8 INJECTION, SOLUTION INTRAVENOUS; SUBCUTANEOUS
Status: DISCONTINUED | OUTPATIENT
Start: 2024-05-09 | End: 2024-05-09 | Stop reason: HOSPADM

## 2024-05-09 RX ORDER — INSULIN LISPRO 100 [IU]/ML
0-4 INJECTION, SOLUTION INTRAVENOUS; SUBCUTANEOUS NIGHTLY
Status: DISCONTINUED | OUTPATIENT
Start: 2024-05-09 | End: 2024-05-09 | Stop reason: HOSPADM

## 2024-05-09 RX ORDER — POTASSIUM CHLORIDE 20 MEQ/1
40 TABLET, EXTENDED RELEASE ORAL EVERY 4 HOURS
Status: COMPLETED | OUTPATIENT
Start: 2024-05-09 | End: 2024-05-09

## 2024-05-09 RX ORDER — INSULIN GLARGINE 100 [IU]/ML
35 INJECTION, SOLUTION SUBCUTANEOUS NIGHTLY
Status: DISCONTINUED | OUTPATIENT
Start: 2024-05-09 | End: 2024-05-09

## 2024-05-09 RX ADMIN — POTASSIUM CHLORIDE 40 MEQ: 1500 TABLET, EXTENDED RELEASE ORAL at 10:14

## 2024-05-09 RX ADMIN — INSULIN LISPRO 5 UNITS: 100 INJECTION, SOLUTION INTRAVENOUS; SUBCUTANEOUS at 17:38

## 2024-05-09 RX ADMIN — INSULIN LISPRO 6 UNITS: 100 INJECTION, SOLUTION INTRAVENOUS; SUBCUTANEOUS at 17:37

## 2024-05-09 RX ADMIN — POTASSIUM CHLORIDE 40 MEQ: 1500 TABLET, EXTENDED RELEASE ORAL at 12:26

## 2024-05-09 RX ADMIN — DEXTROSE AND SODIUM CHLORIDE: 5; 450 INJECTION, SOLUTION INTRAVENOUS at 10:01

## 2024-05-09 RX ADMIN — POTASSIUM CHLORIDE 10 MEQ: 7.46 INJECTION, SOLUTION INTRAVENOUS at 00:40

## 2024-05-09 RX ADMIN — DEXTROSE AND SODIUM CHLORIDE: 5; 450 INJECTION, SOLUTION INTRAVENOUS at 00:41

## 2024-05-09 RX ADMIN — DOCUSATE SODIUM 100 MG: 100 CAPSULE, LIQUID FILLED ORAL at 09:11

## 2024-05-09 RX ADMIN — INSULIN HUMAN 30 UNITS: 100 INJECTION, SUSPENSION SUBCUTANEOUS at 10:15

## 2024-05-09 RX ADMIN — INSULIN LISPRO 5 UNITS: 100 INJECTION, SOLUTION INTRAVENOUS; SUBCUTANEOUS at 12:27

## 2024-05-09 RX ADMIN — SODIUM PHOSPHATE, MONOBASIC, MONOHYDRATE AND SODIUM PHOSPHATE, DIBASIC, ANHYDROUS 15 MMOL: 142; 276 INJECTION, SOLUTION INTRAVENOUS at 02:13

## 2024-05-09 RX ADMIN — POTASSIUM CHLORIDE 40 MEQ: 1500 TABLET, EXTENDED RELEASE ORAL at 17:36

## 2024-05-09 ASSESSMENT — PAIN SCALES - GENERAL: PAINLEVEL_OUTOF10: 0

## 2024-05-09 NOTE — PROGRESS NOTES
Physician Progress Note      PATIENT:               KY CERVANTES  [facility] #:                  [id number]  :                       1980  ADMIT DATE:       2024 9:07 AM  DISCH DATE:  RESPONDING  PROVIDER #:        Adryan Carmona MD          QUERY TEXT:    Good afternoon.    Pt admitted with DKA. Pt noted to have creatinine of 1.44 on admission on    which improved to 0.74 on .    If possible, please document in the progress notes and discharge summary if   you are evaluating and/or treating any of the following:      The medical record reflects the following:    Risk Factors: 43 yr old male, DM    Clinical Indicators: 24 05:17 Creatinine: 1.44, 24   10:31Creatinine: 0.94, 24 14:30 Creatinine: 1.02,  24 20:39 Creatinine: 1.00, 24 22:34 Creatinine: 0.98, 24   00:27 Creatinine: 0.91, 24 06:30  Creatinine: 0.87, 24 10:30 Creatinine: 0.74    Treatment: labs, IV fluids, monitor      Thank you,  Nubia Ruiz RN, CDI  _______________    Defined by Kidney Disease Improving Global Outcomes (KDIGO) clinical practice   guideline for acute kidney injury:  -Increase in SCr by greater than or equal to 0.3 mg/dl within 48 hours; or  -Increase or decrease in SCr to greater than or equal to 1.5 times baseline,   which is known or presumed to have occurred within the prior 7 days; or  -Urine volume < 0.5ml/kg/h for 6 hours  Options provided:  -- Acute kidney injury  -- Other - I will add my own diagnosis  -- Disagree - Not applicable / Not valid  -- Disagree - Clinically unable to determine / Unknown  -- Refer to Clinical Documentation Reviewer    PROVIDER RESPONSE TEXT:    This patient has an Acute kidney injury.    Query created by: Nubia Ruiz on 2024 1:33 PM      Electronically signed by:  Adryan Carmona MD 2024 2:47 PM          
.Progress Note (Hospitalist, Internal Medicine)  IDENTIFYING INFORMATION   PATIENT:  Gorge Balderas Jr.  MRN:  [id number]  ADMIT DATE: 5/7/2024  TIME OF EVALUATION: 5/8/2024 12:40 PM      HISTORY OF PRESENT ILLNESS   Gorge Balderas Jr. is a 43 y.o. male who presents with DKA, admitted to the ICU for insulin infusion DKA protocol.      SUBJECTIVE     DKA almost resolved with residual metabolic acidosis.  Patient tolerating p.o. diet.  Transition to subcutaneous insulin with resolution of anion gap, metabolic acidosis as well as hyperglycemia.    MEDICATIONS   Medications Prior to Admission  Medications Prior to Admission: insulin glargine-yfgn (SEMGLEE-YFGN) 100 UNIT/ML injection vial, Inject 40 Units into the skin nightly (Patient taking differently: Inject 35 Units into the skin nightly)  LANTUS 100 UNIT/ML injection vial, Inject into the skin Sliding scale    Current Medications  Current Facility-Administered Medications   Medication Dose Route Frequency Provider Last Rate Last Admin    glucose chewable tablet 16 g  4 tablet Oral PRN Tim Rodriguez MD        glucagon injection 1 mg  1 mg SubCUTAneous PRN Tim Rodriguez MD        dextrose 10 % infusion   IntraVENous Continuous PRN Tim Rodriguez MD        0.9 % sodium chloride infusion   IntraVENous Continuous Tim Rodriguez MD   Stopped at 05/07/24 1704    dextrose bolus 10% 125 mL  125 mL IntraVENous PRN Liv Matthew MD        Or    dextrose bolus 10% 250 mL  250 mL IntraVENous PRN Liv Matthew MD        potassium chloride 10 mEq/100 mL IVPB (Peripheral Line)  10 mEq IntraVENous PRN Liv Matthew  mL/hr at 05/08/24 0427 10 mEq at 05/08/24 0427    magnesium sulfate 2000 mg in 50 mL IVPB premix  2,000 mg IntraVENous PRN Liv Matthew MD        sodium phosphate 15 mmol in sodium chloride 0.9 % 250 mL IVPB  15 mmol IntraVENous PRN Liv Matthew MD   Stopped at 05/08/24 0838    polyethylene glycol (GLYCOLAX) packet 17 g  17 
Pt stated that he will be leaving today, regardless of medical advice. Dr. Carmona notified and at bedside to educate him of the risks of leaving despite medical advice. Pt acknowledges that he understands, but needs to leave anyway.    1855: Patient signed AMA paperwork, IV discontinued, and disconnected from the monitor. Walked himself downstairs.  
by: Brody Alexander    Basic Metabolic Panel    Collection Time: 05/08/24  6:25 PM   Result Value Ref Range    Sodium 136 136 - 145 mmol/L    Potassium 2.8 (L) 3.5 - 5.1 mmol/L    Chloride 109 (H) 97 - 108 mmol/L    CO2 20 (L) 21 - 32 mmol/L    Anion Gap 7 5 - 15 mmol/L    Glucose 271 (H) 65 - 100 mg/dL    BUN 5 (L) 6 - 20 mg/dL    Creatinine 0.67 (L) 0.70 - 1.30 mg/dL    Bun/Cre Ratio 7 (L) 12 - 20      Est, Glom Filt Rate >90 >60 ml/min/1.73m2    Calcium 7.9 (L) 8.5 - 10.1 mg/dL   Magnesium    Collection Time: 05/08/24  6:25 PM   Result Value Ref Range    Magnesium 1.7 1.6 - 2.4 mg/dL   Phosphorus    Collection Time: 05/08/24  6:25 PM   Result Value Ref Range    Phosphorus 1.2 (L) 2.6 - 4.7 mg/dL   POCT Glucose    Collection Time: 05/08/24  6:54 PM   Result Value Ref Range    POC Glucose 294 (H) 65 - 100 mg/dL    Performed by: Jono Tomas    POCT Glucose    Collection Time: 05/08/24  7:40 PM   Result Value Ref Range    POC Glucose 256 (H) 65 - 100 mg/dL    Performed by: Juani Manzanares    Potassium    Collection Time: 05/08/24  8:05 PM   Result Value Ref Range    Potassium 2.7 (LL) 3.5 - 5.1 mmol/L   POCT Glucose    Collection Time: 05/08/24  8:54 PM   Result Value Ref Range    POC Glucose 178 (H) 65 - 100 mg/dL    Performed by: Reji Boyer    POCT Glucose    Collection Time: 05/08/24  9:58 PM   Result Value Ref Range    POC Glucose 181 (H) 65 - 100 mg/dL    Performed by: Reji Boyer    Basic Metabolic Panel    Collection Time: 05/08/24 10:30 PM   Result Value Ref Range    Sodium 137 136 - 145 mmol/L    Potassium 2.9 (L) 3.5 - 5.1 mmol/L    Chloride 110 (H) 97 - 108 mmol/L    CO2 21 21 - 32 mmol/L    Anion Gap 6 5 - 15 mmol/L    Glucose 228 (H) 65 - 100 mg/dL    BUN 5 (L) 6 - 20 mg/dL    Creatinine 0.77 0.70 - 1.30 mg/dL    Bun/Cre Ratio 6 (L) 12 - 20      Est, Glom Filt Rate >90 >60 ml/min/1.73m2    Calcium 7.8 (L) 8.5 - 10.1 mg/dL   Magnesium    Collection Time: 05/08/24 10:30 PM   Result Value Ref Range

## 2024-05-09 NOTE — CARE COORDINATION
CM reviewed Pt medicals, Pt is IND with ADL, he live with his girlfriend and 4 kids.  Pt works full time at [employer] in [place].     Pt stated that he Medicaid was cut down to limited due to him working and they said he makes too much money.     Pt stated that his insuline was going to cost him $100 at [facility] [employer]    CM will follow for D/C needs.  
CM reviewed Pt medicals, he is IND with ADL, he lives with his girlfriend.    Pt is diabetic and has not filled his insulin due to lack of funds.     CM will meet with Pt.     1:50  CM met f/f with Pt, he stated that they limited his Medicaid because he made too much money. Pt stated that he works at [employer] in [place].     Pt stated that he lives with his Fiance and 4 kids ages 9, 7, 4 and 3. Pt stated that he is the only one working.     Pt stated that at [facility] the medication was going to cost him $100    CM will follow for D/C needs.     
0   Services At/After Discharge   Transition of Care Consult (CM Consult) Discharge Planning   Services At/After Discharge None   [place] Resource Information Provided? No   Mode of Transport at Discharge Other (see comment)  ((GF))   Confirm Follow Up Transport Family     CM met with pt & D/C Plan is home with (GF) & family & (GF) to transport. Send Rxs to [facility] in [place] upon discharge. Uses cane @ times. List as Self Pay - per pt no Insurance - ESS t screen

## 2024-05-09 NOTE — PLAN OF CARE
Problem: Safety - Adult  Goal: Free from fall injury  5/9/2024 1045 by Jesus Reynolds RN  Outcome: Progressing  5/9/2024 0021 by Rosalind Mendoza RN  Outcome: Progressing  Flowsheets (Taken 5/8/2024 0000)  Free From Fall Injury:   Instruct family/caregiver on patient safety   Based on caregiver fall risk screen, instruct family/caregiver to ask for assistance with transferring infant if caregiver noted to have fall risk factors     Problem: Discharge Planning  Goal: Discharge to home or other facility with appropriate resources  Outcome: Progressing     Problem: Chronic Conditions and Co-morbidities  Goal: Patient's chronic conditions and co-morbidity symptoms are monitored and maintained or improved  5/9/2024 1045 by Jesus Reynolds RN  Outcome: Progressing  5/9/2024 0021 by Rosalind Mendoza RN  Outcome: Progressing  Flowsheets (Taken 5/8/2024 0700 by Genoveva De La Cruz, RN)  Care Plan - Patient's Chronic Conditions and Co-Morbidity Symptoms are Monitored and Maintained or Improved:   Monitor and assess patient's chronic conditions and comorbid symptoms for stability, deterioration, or improvement   Collaborate with multidisciplinary team to address chronic and comorbid conditions and prevent exacerbation or deterioration   Update acute care plan with appropriate goals if chronic or comorbid symptoms are exacerbated and prevent overall improvement and discharge     Problem: Neurosensory - Adult  Goal: Achieves stable or improved neurological status  5/9/2024 1045 by Jesus Reynolds RN  Outcome: Progressing  5/9/2024 0021 by Rosalind Mendoza, RN  Outcome: Progressing  Flowsheets (Taken 5/8/2024 0700 by Genoveva De La Cruz, FILI)  Achieves stable or improved neurological status:   Assess for and report changes in neurological status   Initiate measures to prevent increased intracranial pressure   Maintain blood pressure and fluid volume within ordered parameters to optimize cerebral perfusion and minimize risk of hemorrhage   Monitor

## 2024-05-09 NOTE — PLAN OF CARE
Problem: Safety - Adult  Goal: Free from fall injury  5/9/2024 0021 by Rosalind Mendoza RN  Outcome: Progressing  Flowsheets (Taken 5/8/2024 0000)  Free From Fall Injury:   Instruct family/caregiver on patient safety   Based on caregiver fall risk screen, instruct family/caregiver to ask for assistance with transferring infant if caregiver noted to have fall risk factors  5/8/2024 1213 by Genoveva De La Cruz RN  Outcome: Progressing     Problem: Discharge Planning  Goal: Discharge to home or other facility with appropriate resources  5/8/2024 1213 by Genoveva De La Cruz RN  Outcome: Progressing  Flowsheets (Taken 5/8/2024 0700)  Discharge to home or other facility with appropriate resources:   Identify barriers to discharge with patient and caregiver   Arrange for needed discharge resources and transportation as appropriate   Identify discharge learning needs (meds, wound care, etc)   Refer to discharge planning if patient needs post-hospital services based on physician order or complex needs related to functional status, cognitive ability or social support system     Problem: Chronic Conditions and Co-morbidities  Goal: Patient's chronic conditions and co-morbidity symptoms are monitored and maintained or improved  5/9/2024 0021 by Rosalind Mendoza, RN  Outcome: Progressing  Flowsheets (Taken 5/8/2024 0700 by Genoveva De La Cruz, RN)  Care Plan - Patient's Chronic Conditions and Co-Morbidity Symptoms are Monitored and Maintained or Improved:   Monitor and assess patient's chronic conditions and comorbid symptoms for stability, deterioration, or improvement   Collaborate with multidisciplinary team to address chronic and comorbid conditions and prevent exacerbation or deterioration   Update acute care plan with appropriate goals if chronic or comorbid symptoms are exacerbated and prevent overall improvement and discharge  5/8/2024 1213 by Genoveva De La Cruz RN  Outcome: Progressing  Flowsheets (Taken 5/8/2024 0700)  Care Plan - Patient's

## 2024-05-09 NOTE — DISCHARGE SUMMARY
Patient was seen and examined  twice earlier today. Patient wants to leave hospital but his BG >250. Patient electrolytes are normalized. Patient would need to have insulin adjusted for better control. Patient was being evaluated by CM to help him with insulin at [facility]. Patient is very adamant to go as his girl friend is having trouble with anxeity attacks at home and he needs to be with her GF and kids to support her. Patient was AAO X 4 and was given ample opportunity to ask questions. Patient was told he is at risk of going back into DKA without insulin. Patient claims he now has insulin at home and will monitor his BG himself. Patient was advised to reconsider but he still chose to live AMA. Patient was told that he may get sick and go into DKA if he does not take insulin at home and risk for sever DKA leading to potential cardiac arrest and death. Patient was still adamant and signed out AMA with staff. Patient was competent to make this decision. Patient claimed he will follow up with his PCP soon and I asked to make an appointment in next 1-2 days. Patient also told to return to ED if any of his symptoms get worse or re-occur. Patient verbalized understanding. Please see my PN from today and chart for full details regarding his hospitalization course. I send electronic scripts for NPH insulin to his pharmacy on file.  Total time spend 31 minutes.

## 2024-05-10 ENCOUNTER — TELEPHONE (OUTPATIENT)
Facility: CLINIC | Age: 44
End: 2024-05-10

## 2024-05-10 NOTE — TELEPHONE ENCOUNTER
Care Transitions Initial Follow Up Call    Outreach made within 2 business days of discharge: Yes    Patient: Gorge Balderas Jr. Patient : 1980   MRN: [id number]  Reason for Admission: There are no discharge diagnoses documented for the most recent discharge.  Discharge Date: 24       Spoke with: pt number is disconnected unable to reach pt    Discharge department/facility: [facility] Interactive Patient Contact:  Was patient able to fill all prescriptions: No: unknown  Was patient instructed to bring all medications to the follow-up visit: No: unknown  Is patient taking all medications as directed in the discharge summary? unknown  Does patient understand their discharge instructions: No: unknown  Does patient have questions or concerns that need addressed prior to 7-14 day follow up office visit: no    Scheduled appointment with PCP within 7-14 days    Follow Up  Future Appointments   Date Time Provider Department Center   2024 11:15 AM Janine Piedra APRN - NP [facility] LPN

## 2024-08-13 SDOH — ECONOMIC STABILITY: FOOD INSECURITY: WITHIN THE PAST 12 MONTHS, YOU WORRIED THAT YOUR FOOD WOULD RUN OUT BEFORE YOU GOT MONEY TO BUY MORE.: SOMETIMES TRUE

## 2024-08-13 SDOH — ECONOMIC STABILITY: FOOD INSECURITY: WITHIN THE PAST 12 MONTHS, THE FOOD YOU BOUGHT JUST DIDN'T LAST AND YOU DIDN'T HAVE MONEY TO GET MORE.: SOMETIMES TRUE

## 2024-08-13 SDOH — ECONOMIC STABILITY: INCOME INSECURITY: HOW HARD IS IT FOR YOU TO PAY FOR THE VERY BASICS LIKE FOOD, HOUSING, MEDICAL CARE, AND HEATING?: SOMEWHAT HARD

## 2024-08-13 SDOH — ECONOMIC STABILITY: TRANSPORTATION INSECURITY
IN THE PAST 12 MONTHS, HAS LACK OF TRANSPORTATION KEPT YOU FROM MEETINGS, WORK, OR FROM GETTING THINGS NEEDED FOR DAILY LIVING?: NO

## 2024-08-15 ENCOUNTER — OFFICE VISIT (OUTPATIENT)
Facility: CLINIC | Age: 44
End: 2024-08-15

## 2024-08-15 VITALS
BODY MASS INDEX: 25.55 KG/M2 | HEART RATE: 80 BPM | DIASTOLIC BLOOD PRESSURE: 70 MMHG | OXYGEN SATURATION: 98 % | TEMPERATURE: 97.7 F | WEIGHT: 159 LBS | SYSTOLIC BLOOD PRESSURE: 108 MMHG | HEIGHT: 66 IN | RESPIRATION RATE: 18 BRPM

## 2024-08-15 DIAGNOSIS — E11.65 UNCONTROLLED TYPE 2 DIABETES MELLITUS WITH HYPERGLYCEMIA (HCC): Primary | ICD-10-CM

## 2024-08-15 LAB — HBA1C MFR BLD: 15 %

## 2024-08-15 PROCEDURE — 3046F HEMOGLOBIN A1C LEVEL >9.0%: CPT | Performed by: NURSE PRACTITIONER

## 2024-08-15 PROCEDURE — 99214 OFFICE O/P EST MOD 30 MIN: CPT | Performed by: NURSE PRACTITIONER

## 2024-08-15 PROCEDURE — 83036 HEMOGLOBIN GLYCOSYLATED A1C: CPT | Performed by: NURSE PRACTITIONER

## 2024-08-15 RX ORDER — GLIMEPIRIDE 2 MG/1
1 TABLET ORAL DAILY
COMMUNITY
End: 2024-08-15 | Stop reason: ALTCHOICE

## 2024-08-15 RX ORDER — METFORMIN HYDROCHLORIDE 500 MG/1
1000 TABLET, EXTENDED RELEASE ORAL 2 TIMES DAILY
Qty: 360 TABLET | Refills: 0 | Status: CANCELLED | OUTPATIENT
Start: 2024-08-15 | End: 2024-11-13

## 2024-08-15 RX ORDER — PIOGLITAZONEHYDROCHLORIDE 15 MG/1
15 TABLET ORAL DAILY
Qty: 30 TABLET | Refills: 3 | Status: SHIPPED | OUTPATIENT
Start: 2024-08-15

## 2024-08-15 ASSESSMENT — ENCOUNTER SYMPTOMS
APNEA: 0
SINUS PAIN: 0
SORE THROAT: 0
COUGH: 0
COLOR CHANGE: 0
FACIAL SWELLING: 0
PHOTOPHOBIA: 0
DIARRHEA: 0
VOMITING: 0
BACK PAIN: 0
CONSTIPATION: 0
CHOKING: 0
ABDOMINAL DISTENTION: 0
EYE ITCHING: 0
CHEST TIGHTNESS: 0
NAUSEA: 0
TROUBLE SWALLOWING: 0
ABDOMINAL PAIN: 0
BLOOD IN STOOL: 0
EYE DISCHARGE: 0
EYE REDNESS: 0
WHEEZING: 0
EYE PAIN: 0
SHORTNESS OF BREATH: 0

## 2024-08-15 ASSESSMENT — PATIENT HEALTH QUESTIONNAIRE - PHQ9
SUM OF ALL RESPONSES TO PHQ QUESTIONS 1-9: 0
SUM OF ALL RESPONSES TO PHQ9 QUESTIONS 1 & 2: 0
1. LITTLE INTEREST OR PLEASURE IN DOING THINGS: NOT AT ALL
2. FEELING DOWN, DEPRESSED OR HOPELESS: NOT AT ALL

## 2024-08-15 NOTE — PROGRESS NOTES
Chief Complaint   Patient presents with    Diabetes     Follow up     Has [facility] paperwork for intermittent leave     Pt did not bring meds, went over list, pt confirmed     \"Have you been to the ER, urgent care clinic since your last visit?  Hospitalized since your last visit?\"    In chart     “Have you seen or consulted any other health care providers outside of [facility] since your last visit?”    NO            Click Here for Release of Records Request

## 2024-08-15 NOTE — PROGRESS NOTES
Subjective  Chief Complaint   Patient presents with    Diabetes     HPI:  Gorge Balderas Jr. is a 43 y.o. male with PMH of type 2 diabetes who presents for follow-up.  Reports that he is taking insulin once daily.  Was hospitalized back in 5/2024 due to DKA.  Has not been insured and not followed up since hospitalization.  Not checking glucose regularly.  Does not monitor diet or get regular exercise.  Has recently started a job at [employer] and hopes to soon have medical insurance.  Does report urinary frequency but feels well otherwise.  At previous visit was having pain in legs but this has been resolved since stopping metformin.       Past Medical History:   Diagnosis Date    Diabetes mellitus (HCC) 12/16/2019    Type II        Past Surgical History:   Procedure Laterality Date    APPENDECTOMY          Family History   Problem Relation Age of Onset    Diabetes Paternal Grandmother     Hypertension Paternal Grandmother     Arthritis Father     No Known Problems Mother     Diabetes Maternal Grandmother         Social History     Socioeconomic History    Marital status: Single   Tobacco Use    Smoking status: Some Days     Current packs/day: 0.25     Average packs/day: 0.3 packs/day for 1 year (0.3 ttl pk-yrs)     Types: Cigarettes    Smokeless tobacco: Never    Tobacco comments:     Some times i quit cold turkey   Substance and Sexual Activity    Alcohol use: Not Currently    Drug use: Yes     Frequency: 2.0 times per week     Types: Marijuana (Weed)    Sexual activity: Yes     Partners: Female   Social History Narrative    Resources forms giving to pt. KK      Social Determinants of Health     Financial Resource Strain: High Risk (2/22/2023)    Overall Financial Resource Strain (CARDIA)     Difficulty of Paying Living Expenses: Very hard   Transportation Needs: Unmet Transportation Needs (5/8/2024)    PRAPARE - Transportation     Lack of Transportation (Medical): Yes     Lack of Transportation (Non-Medical): Yes

## 2024-09-11 ENCOUNTER — OFFICE VISIT (OUTPATIENT)
Facility: CLINIC | Age: 44
End: 2024-09-11
Payer: COMMERCIAL

## 2024-09-11 VITALS
TEMPERATURE: 98.1 F | OXYGEN SATURATION: 97 % | RESPIRATION RATE: 18 BRPM | HEART RATE: 85 BPM | HEIGHT: 66 IN | DIASTOLIC BLOOD PRESSURE: 71 MMHG | SYSTOLIC BLOOD PRESSURE: 116 MMHG | BODY MASS INDEX: 25.71 KG/M2 | WEIGHT: 160 LBS

## 2024-09-11 DIAGNOSIS — E11.65 UNCONTROLLED TYPE 2 DIABETES MELLITUS WITH HYPERGLYCEMIA (HCC): Primary | ICD-10-CM

## 2024-09-11 PROCEDURE — 99214 OFFICE O/P EST MOD 30 MIN: CPT | Performed by: NURSE PRACTITIONER

## 2024-09-11 PROCEDURE — 3046F HEMOGLOBIN A1C LEVEL >9.0%: CPT | Performed by: NURSE PRACTITIONER

## 2024-09-11 RX ORDER — ACYCLOVIR 400 MG/1
TABLET ORAL
Qty: 1 EACH | Refills: 0 | Status: SHIPPED | OUTPATIENT
Start: 2024-09-11

## 2024-09-11 RX ORDER — PIOGLITAZONEHYDROCHLORIDE 30 MG/1
30 TABLET ORAL DAILY
Qty: 30 TABLET | Refills: 5 | Status: SHIPPED | OUTPATIENT
Start: 2024-09-11

## 2024-09-11 RX ORDER — INSULIN LISPRO 100 [IU]/ML
6 INJECTION, SOLUTION INTRAVENOUS; SUBCUTANEOUS
Qty: 10 ML | Refills: 2 | Status: SHIPPED | OUTPATIENT
Start: 2024-09-11 | End: 2024-10-11

## 2024-09-11 RX ORDER — ACYCLOVIR 400 MG/1
TABLET ORAL
Qty: 3 EACH | Refills: 5 | Status: SHIPPED | OUTPATIENT
Start: 2024-09-11

## 2024-09-11 ASSESSMENT — ENCOUNTER SYMPTOMS
COLOR CHANGE: 0
TROUBLE SWALLOWING: 0
VOMITING: 0
SINUS PAIN: 0
ABDOMINAL DISTENTION: 0
NAUSEA: 0
CHEST TIGHTNESS: 0
EYE DISCHARGE: 0
SORE THROAT: 0
CONSTIPATION: 0
WHEEZING: 0
EYE PAIN: 0
FACIAL SWELLING: 0
PHOTOPHOBIA: 0
EYE REDNESS: 0
COUGH: 0
APNEA: 0
BLOOD IN STOOL: 0
DIARRHEA: 0
ABDOMINAL PAIN: 0
SHORTNESS OF BREATH: 0
STRIDOR: 0
BACK PAIN: 0
CHOKING: 0
EYE ITCHING: 0

## 2025-03-17 ENCOUNTER — APPOINTMENT (OUTPATIENT)
Facility: HOSPITAL | Age: 45
End: 2025-03-17
Payer: MEDICAID

## 2025-03-17 ENCOUNTER — HOSPITAL ENCOUNTER (INPATIENT)
Facility: HOSPITAL | Age: 45
LOS: 2 days | Discharge: HOME | DRG: 420 | End: 2025-03-19
Attending: STUDENT IN AN ORGANIZED HEALTH CARE EDUCATION/TRAINING PROGRAM | Admitting: STUDENT IN AN ORGANIZED HEALTH CARE EDUCATION/TRAINING PROGRAM
Payer: MEDICAID

## 2025-03-17 ENCOUNTER — HOSPITAL ENCOUNTER (EMERGENCY)
Facility: HOSPITAL | Age: 45
Discharge: ANOTHER HOSPITAL | End: 2025-03-17
Attending: EMERGENCY MEDICINE
Payer: MEDICAID

## 2025-03-17 VITALS
BODY MASS INDEX: 25.71 KG/M2 | HEIGHT: 66 IN | WEIGHT: 160 LBS | RESPIRATION RATE: 15 BRPM | SYSTOLIC BLOOD PRESSURE: 125 MMHG | DIASTOLIC BLOOD PRESSURE: 78 MMHG | OXYGEN SATURATION: 99 % | TEMPERATURE: 97.6 F | HEART RATE: 94 BPM

## 2025-03-17 DIAGNOSIS — E11.65 TYPE 2 DIABETES MELLITUS WITH HYPERGLYCEMIA, WITH LONG-TERM CURRENT USE OF INSULIN (HCC): ICD-10-CM

## 2025-03-17 DIAGNOSIS — E10.10 DKA, TYPE 1, NOT AT GOAL (HCC): Primary | ICD-10-CM

## 2025-03-17 DIAGNOSIS — J10.1 INFLUENZA B: ICD-10-CM

## 2025-03-17 DIAGNOSIS — Z79.4 TYPE 2 DIABETES MELLITUS WITH HYPERGLYCEMIA, WITH LONG-TERM CURRENT USE OF INSULIN (HCC): ICD-10-CM

## 2025-03-17 DIAGNOSIS — E10.10 TYPE 1 DIABETES MELLITUS WITH KETOACIDOSIS WITHOUT COMA (HCC): Primary | ICD-10-CM

## 2025-03-17 LAB
ALBUMIN SERPL-MCNC: 3.7 G/DL (ref 3.5–5)
ALBUMIN/GLOB SERPL: 0.7 (ref 1.1–2.2)
ALP SERPL-CCNC: 125 U/L (ref 45–117)
ALT SERPL-CCNC: 23 U/L (ref 12–78)
ANION GAP SERPL CALC-SCNC: 10 MMOL/L (ref 2–12)
ANION GAP SERPL CALC-SCNC: 22 MMOL/L (ref 2–12)
ANION GAP SERPL CALC-SCNC: 25 MMOL/L (ref 2–12)
ARTERIAL PATENCY WRIST A: YES
AST SERPL W P-5'-P-CCNC: 15 U/L (ref 15–37)
BASE DEFICIT BLDA-SCNC: 19.4 MMOL/L
BASOPHILS # BLD: 0.02 K/UL (ref 0–0.1)
BASOPHILS NFR BLD: 0.2 % (ref 0–1)
BDY SITE: ABNORMAL
BILIRUB SERPL-MCNC: 0.6 MG/DL (ref 0.2–1)
BUN SERPL-MCNC: 12 MG/DL (ref 6–20)
BUN SERPL-MCNC: 16 MG/DL (ref 6–20)
BUN SERPL-MCNC: 17 MG/DL (ref 6–20)
BUN/CREAT SERPL: 11 (ref 12–20)
BUN/CREAT SERPL: 12 (ref 12–20)
BUN/CREAT SERPL: 9 (ref 12–20)
CA-I BLD-MCNC: 8 MG/DL (ref 8.5–10.1)
CA-I BLD-MCNC: 8.3 MG/DL (ref 8.5–10.1)
CA-I BLD-MCNC: 9 MG/DL (ref 8.5–10.1)
CHLORIDE SERPL-SCNC: 109 MMOL/L (ref 97–108)
CHLORIDE SERPL-SCNC: 91 MMOL/L (ref 97–108)
CHLORIDE SERPL-SCNC: 98 MMOL/L (ref 97–108)
CO2 SERPL-SCNC: 10 MMOL/L (ref 21–32)
CO2 SERPL-SCNC: 11 MMOL/L (ref 21–32)
CO2 SERPL-SCNC: 17 MMOL/L (ref 21–32)
COHGB MFR BLD: 0.3 % (ref 1–2)
CREAT SERPL-MCNC: 1.28 MG/DL (ref 0.7–1.3)
CREAT SERPL-MCNC: 1.31 MG/DL (ref 0.7–1.3)
CREAT SERPL-MCNC: 1.52 MG/DL (ref 0.7–1.3)
DIFFERENTIAL METHOD BLD: ABNORMAL
EOSINOPHIL # BLD: 0 K/UL (ref 0–0.4)
EOSINOPHIL NFR BLD: 0 % (ref 0–7)
ERYTHROCYTE [DISTWIDTH] IN BLOOD BY AUTOMATED COUNT: 12.9 % (ref 11.5–14.5)
EST. AVERAGE GLUCOSE BLD GHB EST-MCNC: 306 MG/DL
FLUAV RNA SPEC QL NAA+PROBE: NOT DETECTED
FLUBV RNA SPEC QL NAA+PROBE: DETECTED
GLOBULIN SER CALC-MCNC: 5.2 G/DL (ref 2–4)
GLUCOSE BLD STRIP.AUTO-MCNC: 198 MG/DL (ref 65–100)
GLUCOSE BLD STRIP.AUTO-MCNC: 239 MG/DL (ref 65–100)
GLUCOSE BLD STRIP.AUTO-MCNC: 245 MG/DL (ref 65–100)
GLUCOSE BLD STRIP.AUTO-MCNC: 253 MG/DL (ref 65–100)
GLUCOSE BLD STRIP.AUTO-MCNC: 267 MG/DL (ref 65–100)
GLUCOSE BLD STRIP.AUTO-MCNC: 450 MG/DL (ref 65–100)
GLUCOSE SERPL-MCNC: 217 MG/DL (ref 65–100)
GLUCOSE SERPL-MCNC: 265 MG/DL (ref 65–100)
GLUCOSE SERPL-MCNC: 393 MG/DL (ref 65–100)
HBA1C MFR BLD: 12.3 % (ref 4–5.6)
HCO3 BLDA-SCNC: 7 MMOL/L (ref 22–26)
HCT VFR BLD AUTO: 48.4 % (ref 36.6–50.3)
HGB BLD-MCNC: 16.6 G/DL (ref 12.1–17)
IMM GRANULOCYTES # BLD AUTO: 0.04 K/UL (ref 0–0.04)
IMM GRANULOCYTES NFR BLD AUTO: 0.4 % (ref 0–0.5)
LACTATE SERPL-SCNC: 1.4 MMOL/L (ref 0.4–2)
LYMPHOCYTES # BLD: 1.17 K/UL (ref 0.8–3.5)
LYMPHOCYTES NFR BLD: 10.4 % (ref 12–49)
MAGNESIUM SERPL-MCNC: 1.7 MG/DL (ref 1.6–2.4)
MCH RBC QN AUTO: 30.9 PG (ref 26–34)
MCHC RBC AUTO-ENTMCNC: 34.3 G/DL (ref 30–36.5)
MCV RBC AUTO: 90 FL (ref 80–99)
METHGB MFR BLD: 0.2 % (ref 0–1.4)
MONOCYTES # BLD: 0.57 K/UL (ref 0–1)
MONOCYTES NFR BLD: 5.1 % (ref 5–13)
MRSA DNA SPEC QL NAA+PROBE: NOT DETECTED
NEUTS SEG # BLD: 9.4 K/UL (ref 1.8–8)
NEUTS SEG NFR BLD: 83.9 % (ref 32–75)
NRBC # BLD: 0 K/UL (ref 0–0.01)
NRBC BLD-RTO: 0 PER 100 WBC
OXYHGB MFR BLD: 97 % (ref 95–99)
PCO2 BLDA: 20 MMHG (ref 35–45)
PERFORMED BY:: ABNORMAL
PH BLDA: 7.16 (ref 7.35–7.45)
PHOSPHATE SERPL-MCNC: 1.2 MG/DL (ref 2.6–4.7)
PLATELET # BLD AUTO: 113 K/UL (ref 150–400)
PMV BLD AUTO: 10.5 FL (ref 8.9–12.9)
PO2 BLDA: 112 MMHG (ref 80–100)
POTASSIUM SERPL-SCNC: 3.7 MMOL/L (ref 3.5–5.1)
POTASSIUM SERPL-SCNC: 4 MMOL/L (ref 3.5–5.1)
POTASSIUM SERPL-SCNC: 5.6 MMOL/L (ref 3.5–5.1)
PROT SERPL-MCNC: 8.9 G/DL (ref 6.4–8.2)
RBC # BLD AUTO: 5.38 M/UL (ref 4.1–5.7)
SAO2% DEVICE SAO2% SENSOR NAME: ABNORMAL
SARS-COV-2 RNA RESP QL NAA+PROBE: NOT DETECTED
SERVICE CMNT-IMP: ABNORMAL
SODIUM SERPL-SCNC: 126 MMOL/L (ref 136–145)
SODIUM SERPL-SCNC: 131 MMOL/L (ref 136–145)
SODIUM SERPL-SCNC: 136 MMOL/L (ref 136–145)
SPECIMEN SITE: ABNORMAL
TROPONIN I SERPL HS-MCNC: 10 NG/L (ref 0–76)
TROPONIN I SERPL HS-MCNC: 12 NG/L (ref 0–76)
WBC # BLD AUTO: 11.2 K/UL (ref 4.1–11.1)

## 2025-03-17 PROCEDURE — 87636 SARSCOV2 & INF A&B AMP PRB: CPT

## 2025-03-17 PROCEDURE — 93005 ELECTROCARDIOGRAM TRACING: CPT | Performed by: EMERGENCY MEDICINE

## 2025-03-17 PROCEDURE — 84100 ASSAY OF PHOSPHORUS: CPT

## 2025-03-17 PROCEDURE — 83036 HEMOGLOBIN GLYCOSYLATED A1C: CPT

## 2025-03-17 PROCEDURE — 85025 COMPLETE CBC W/AUTO DIFF WBC: CPT

## 2025-03-17 PROCEDURE — 80048 BASIC METABOLIC PNL TOTAL CA: CPT

## 2025-03-17 PROCEDURE — 2580000003 HC RX 258: Performed by: STUDENT IN AN ORGANIZED HEALTH CARE EDUCATION/TRAINING PROGRAM

## 2025-03-17 PROCEDURE — 71045 X-RAY EXAM CHEST 1 VIEW: CPT

## 2025-03-17 PROCEDURE — 87641 MR-STAPH DNA AMP PROBE: CPT

## 2025-03-17 PROCEDURE — 96376 TX/PRO/DX INJ SAME DRUG ADON: CPT

## 2025-03-17 PROCEDURE — 36600 WITHDRAWAL OF ARTERIAL BLOOD: CPT

## 2025-03-17 PROCEDURE — 6370000000 HC RX 637 (ALT 250 FOR IP): Performed by: STUDENT IN AN ORGANIZED HEALTH CARE EDUCATION/TRAINING PROGRAM

## 2025-03-17 PROCEDURE — 83605 ASSAY OF LACTIC ACID: CPT

## 2025-03-17 PROCEDURE — 84484 ASSAY OF TROPONIN QUANT: CPT

## 2025-03-17 PROCEDURE — 6370000000 HC RX 637 (ALT 250 FOR IP): Performed by: EMERGENCY MEDICINE

## 2025-03-17 PROCEDURE — 96366 THER/PROPH/DIAG IV INF ADDON: CPT

## 2025-03-17 PROCEDURE — 82803 BLOOD GASES ANY COMBINATION: CPT

## 2025-03-17 PROCEDURE — 96365 THER/PROPH/DIAG IV INF INIT: CPT

## 2025-03-17 PROCEDURE — 2000000000 HC ICU R&B

## 2025-03-17 PROCEDURE — 2500000003 HC RX 250 WO HCPCS: Performed by: STUDENT IN AN ORGANIZED HEALTH CARE EDUCATION/TRAINING PROGRAM

## 2025-03-17 PROCEDURE — 2580000003 HC RX 258: Performed by: EMERGENCY MEDICINE

## 2025-03-17 PROCEDURE — 80053 COMPREHEN METABOLIC PANEL: CPT

## 2025-03-17 PROCEDURE — 82962 GLUCOSE BLOOD TEST: CPT

## 2025-03-17 PROCEDURE — 99285 EMERGENCY DEPT VISIT HI MDM: CPT

## 2025-03-17 PROCEDURE — 83735 ASSAY OF MAGNESIUM: CPT

## 2025-03-17 PROCEDURE — 6370000000 HC RX 637 (ALT 250 FOR IP): Performed by: NURSE PRACTITIONER

## 2025-03-17 PROCEDURE — 96361 HYDRATE IV INFUSION ADD-ON: CPT

## 2025-03-17 PROCEDURE — 6360000002 HC RX W HCPCS: Performed by: STUDENT IN AN ORGANIZED HEALTH CARE EDUCATION/TRAINING PROGRAM

## 2025-03-17 RX ORDER — POTASSIUM CHLORIDE 7.45 MG/ML
10 INJECTION INTRAVENOUS PRN
Status: CANCELLED | OUTPATIENT
Start: 2025-03-17

## 2025-03-17 RX ORDER — POTASSIUM CHLORIDE 29.8 MG/ML
20 INJECTION INTRAVENOUS PRN
Status: DISCONTINUED | OUTPATIENT
Start: 2025-03-17 | End: 2025-03-19 | Stop reason: HOSPADM

## 2025-03-17 RX ORDER — MAGNESIUM SULFATE IN WATER 40 MG/ML
2000 INJECTION, SOLUTION INTRAVENOUS PRN
Status: DISCONTINUED | OUTPATIENT
Start: 2025-03-17 | End: 2025-03-19 | Stop reason: HOSPADM

## 2025-03-17 RX ORDER — DEXTROSE MONOHYDRATE 100 MG/ML
INJECTION, SOLUTION INTRAVENOUS CONTINUOUS PRN
Status: DISCONTINUED | OUTPATIENT
Start: 2025-03-17 | End: 2025-03-17 | Stop reason: HOSPADM

## 2025-03-17 RX ORDER — SODIUM CHLORIDE 0.9 % (FLUSH) 0.9 %
5-40 SYRINGE (ML) INJECTION PRN
Status: CANCELLED | OUTPATIENT
Start: 2025-03-17

## 2025-03-17 RX ORDER — DEXTROSE MONOHYDRATE AND SODIUM CHLORIDE 5; .45 G/100ML; G/100ML
INJECTION, SOLUTION INTRAVENOUS CONTINUOUS PRN
Status: CANCELLED | OUTPATIENT
Start: 2025-03-17

## 2025-03-17 RX ORDER — POTASSIUM CHLORIDE 7.45 MG/ML
10 INJECTION INTRAVENOUS PRN
Status: DISCONTINUED | OUTPATIENT
Start: 2025-03-17 | End: 2025-03-19 | Stop reason: HOSPADM

## 2025-03-17 RX ORDER — SODIUM CHLORIDE 9 MG/ML
INJECTION, SOLUTION INTRAVENOUS PRN
Status: DISCONTINUED | OUTPATIENT
Start: 2025-03-17 | End: 2025-03-19 | Stop reason: HOSPADM

## 2025-03-17 RX ORDER — DEXTROSE MONOHYDRATE AND SODIUM CHLORIDE 5; .45 G/100ML; G/100ML
INJECTION, SOLUTION INTRAVENOUS CONTINUOUS PRN
Status: DISCONTINUED | OUTPATIENT
Start: 2025-03-17 | End: 2025-03-18

## 2025-03-17 RX ORDER — OSELTAMIVIR PHOSPHATE 75 MG/1
75 CAPSULE ORAL 2 TIMES DAILY
Status: DISCONTINUED | OUTPATIENT
Start: 2025-03-17 | End: 2025-03-19 | Stop reason: HOSPADM

## 2025-03-17 RX ORDER — ONDANSETRON 2 MG/ML
4 INJECTION INTRAMUSCULAR; INTRAVENOUS EVERY 6 HOURS PRN
Status: CANCELLED | OUTPATIENT
Start: 2025-03-17

## 2025-03-17 RX ORDER — SODIUM CHLORIDE 0.9 % (FLUSH) 0.9 %
5-40 SYRINGE (ML) INJECTION EVERY 12 HOURS SCHEDULED
Status: CANCELLED | OUTPATIENT
Start: 2025-03-17

## 2025-03-17 RX ORDER — 0.9 % SODIUM CHLORIDE 0.9 %
15 INTRAVENOUS SOLUTION INTRAVENOUS ONCE
Status: DISCONTINUED | OUTPATIENT
Start: 2025-03-17 | End: 2025-03-18

## 2025-03-17 RX ORDER — ACETAMINOPHEN 650 MG/1
650 SUPPOSITORY RECTAL EVERY 6 HOURS PRN
Status: DISCONTINUED | OUTPATIENT
Start: 2025-03-17 | End: 2025-03-19 | Stop reason: HOSPADM

## 2025-03-17 RX ORDER — SODIUM CHLORIDE 9 MG/ML
INJECTION, SOLUTION INTRAVENOUS CONTINUOUS
Status: DISCONTINUED | OUTPATIENT
Start: 2025-03-17 | End: 2025-03-18

## 2025-03-17 RX ORDER — GLUCAGON 1 MG/ML
1 KIT INJECTION PRN
Status: DISCONTINUED | OUTPATIENT
Start: 2025-03-17 | End: 2025-03-17 | Stop reason: HOSPADM

## 2025-03-17 RX ORDER — SODIUM CHLORIDE 9 MG/ML
INJECTION, SOLUTION INTRAVENOUS CONTINUOUS
Status: CANCELLED | OUTPATIENT
Start: 2025-03-17

## 2025-03-17 RX ORDER — 0.9 % SODIUM CHLORIDE 0.9 %
1000 INTRAVENOUS SOLUTION INTRAVENOUS ONCE
Status: COMPLETED | OUTPATIENT
Start: 2025-03-17 | End: 2025-03-17

## 2025-03-17 RX ORDER — MAGNESIUM SULFATE IN WATER 40 MG/ML
2000 INJECTION, SOLUTION INTRAVENOUS PRN
Status: CANCELLED | OUTPATIENT
Start: 2025-03-17

## 2025-03-17 RX ORDER — POLYETHYLENE GLYCOL 3350 17 G/17G
17 POWDER, FOR SOLUTION ORAL DAILY PRN
Status: CANCELLED | OUTPATIENT
Start: 2025-03-17

## 2025-03-17 RX ORDER — POLYETHYLENE GLYCOL 3350 17 G/17G
17 POWDER, FOR SOLUTION ORAL DAILY PRN
Status: DISCONTINUED | OUTPATIENT
Start: 2025-03-17 | End: 2025-03-19 | Stop reason: HOSPADM

## 2025-03-17 RX ORDER — ONDANSETRON 4 MG/1
4 TABLET, ORALLY DISINTEGRATING ORAL EVERY 8 HOURS PRN
Status: DISCONTINUED | OUTPATIENT
Start: 2025-03-17 | End: 2025-03-19 | Stop reason: HOSPADM

## 2025-03-17 RX ORDER — ACETAMINOPHEN 325 MG/1
650 TABLET ORAL
Status: COMPLETED | OUTPATIENT
Start: 2025-03-17 | End: 2025-03-17

## 2025-03-17 RX ORDER — POTASSIUM CHLORIDE 29.8 MG/ML
20 INJECTION INTRAVENOUS PRN
Status: CANCELLED | OUTPATIENT
Start: 2025-03-17

## 2025-03-17 RX ORDER — SODIUM CHLORIDE 9 MG/ML
INJECTION, SOLUTION INTRAVENOUS PRN
Status: CANCELLED | OUTPATIENT
Start: 2025-03-17

## 2025-03-17 RX ORDER — ENOXAPARIN SODIUM 100 MG/ML
40 INJECTION SUBCUTANEOUS DAILY
Status: CANCELLED | OUTPATIENT
Start: 2025-03-17

## 2025-03-17 RX ORDER — SODIUM CHLORIDE 0.9 % (FLUSH) 0.9 %
5-40 SYRINGE (ML) INJECTION PRN
Status: DISCONTINUED | OUTPATIENT
Start: 2025-03-17 | End: 2025-03-19 | Stop reason: HOSPADM

## 2025-03-17 RX ORDER — ENOXAPARIN SODIUM 100 MG/ML
40 INJECTION SUBCUTANEOUS DAILY
Status: DISCONTINUED | OUTPATIENT
Start: 2025-03-17 | End: 2025-03-19 | Stop reason: HOSPADM

## 2025-03-17 RX ORDER — ACETAMINOPHEN 325 MG/1
650 TABLET ORAL EVERY 6 HOURS PRN
Status: DISCONTINUED | OUTPATIENT
Start: 2025-03-17 | End: 2025-03-19 | Stop reason: HOSPADM

## 2025-03-17 RX ORDER — ONDANSETRON 2 MG/ML
4 INJECTION INTRAMUSCULAR; INTRAVENOUS EVERY 6 HOURS PRN
Status: DISCONTINUED | OUTPATIENT
Start: 2025-03-17 | End: 2025-03-19 | Stop reason: HOSPADM

## 2025-03-17 RX ORDER — ONDANSETRON 4 MG/1
4 TABLET, ORALLY DISINTEGRATING ORAL EVERY 8 HOURS PRN
Status: CANCELLED | OUTPATIENT
Start: 2025-03-17

## 2025-03-17 RX ORDER — 0.9 % SODIUM CHLORIDE 0.9 %
15 INTRAVENOUS SOLUTION INTRAVENOUS ONCE
Status: CANCELLED | OUTPATIENT
Start: 2025-03-17 | End: 2025-03-17

## 2025-03-17 RX ORDER — OSELTAMIVIR PHOSPHATE 75 MG/1
75 CAPSULE ORAL
Status: COMPLETED | OUTPATIENT
Start: 2025-03-17 | End: 2025-03-17

## 2025-03-17 RX ORDER — SODIUM CHLORIDE 0.9 % (FLUSH) 0.9 %
5-40 SYRINGE (ML) INJECTION EVERY 12 HOURS SCHEDULED
Status: DISCONTINUED | OUTPATIENT
Start: 2025-03-17 | End: 2025-03-18

## 2025-03-17 RX ORDER — ACETAMINOPHEN 325 MG/1
650 TABLET ORAL EVERY 6 HOURS PRN
Status: CANCELLED | OUTPATIENT
Start: 2025-03-17

## 2025-03-17 RX ORDER — ACETAMINOPHEN 650 MG/1
650 SUPPOSITORY RECTAL EVERY 6 HOURS PRN
Status: CANCELLED | OUTPATIENT
Start: 2025-03-17

## 2025-03-17 RX ADMIN — SODIUM CHLORIDE 1000 ML: 0.9 INJECTION, SOLUTION INTRAVENOUS at 14:45

## 2025-03-17 RX ADMIN — SODIUM CHLORIDE: 0.9 INJECTION, SOLUTION INTRAVENOUS at 21:28

## 2025-03-17 RX ADMIN — OSELTAMIVIR PHOSPHATE 75 MG: 75 CAPSULE ORAL at 14:46

## 2025-03-17 RX ADMIN — ENOXAPARIN SODIUM 40 MG: 100 INJECTION SUBCUTANEOUS at 21:18

## 2025-03-17 RX ADMIN — DEXTROSE AND SODIUM CHLORIDE: 5; .45 INJECTION, SOLUTION INTRAVENOUS at 22:51

## 2025-03-17 RX ADMIN — INSULIN HUMAN 5 UNITS: 100 INJECTION, SOLUTION PARENTERAL at 13:26

## 2025-03-17 RX ADMIN — SODIUM CHLORIDE 1000 ML: 0.9 INJECTION, SOLUTION INTRAVENOUS at 13:28

## 2025-03-17 RX ADMIN — INSULIN HUMAN 6 UNITS/HR: 1 INJECTION, SOLUTION INTRAVENOUS at 14:51

## 2025-03-17 RX ADMIN — OSELTAMIVIR PHOSPHATE 75 MG: 75 CAPSULE ORAL at 21:18

## 2025-03-17 RX ADMIN — SODIUM CHLORIDE, PRESERVATIVE FREE 10 ML: 5 INJECTION INTRAVENOUS at 21:24

## 2025-03-17 RX ADMIN — INSULIN HUMAN 7 UNITS: 100 INJECTION, SOLUTION PARENTERAL at 14:38

## 2025-03-17 RX ADMIN — INSULIN HUMAN 4.1 UNITS/HR: 1 INJECTION, SOLUTION INTRAVENOUS at 21:24

## 2025-03-17 RX ADMIN — ACETAMINOPHEN 650 MG: 325 TABLET ORAL at 14:28

## 2025-03-17 ASSESSMENT — PAIN SCALES - GENERAL
PAINLEVEL_OUTOF10: 8
PAINLEVEL_OUTOF10: 5
PAINLEVEL_OUTOF10: 7

## 2025-03-17 ASSESSMENT — ENCOUNTER SYMPTOMS: RESPIRATORY NEGATIVE: 1

## 2025-03-17 ASSESSMENT — PAIN DESCRIPTION - LOCATION: LOCATION: HEAD

## 2025-03-17 ASSESSMENT — PAIN - FUNCTIONAL ASSESSMENT: PAIN_FUNCTIONAL_ASSESSMENT: 0-10

## 2025-03-17 NOTE — ED TRIAGE NOTES
Pt has family member recently dx with Flu B. Pt states x5 days, pt has been experiencing fatigue, cough, aches, generally feeling unwell.     Pt reports type 2 dm, has not been checking sugar or taking insulin.POC glucose in triage 450.

## 2025-03-17 NOTE — ED NOTES
Exam room 10 open. Offered to move pt and family to exam 10 for more accurate monitoring. Same declined by pt.

## 2025-03-17 NOTE — ED NOTES
Pt's girlfriend to desk - wanting to know when pt will be seen- made aware he will be in shortly. She states \"can't he get some insulin or something?' Made her aware nurses are unable to give insulin without an order. Made her aware nurse would be in once seen by MD.  Offered water- pt accepted. Offered to move pt to another room- pt refused. Pt asked about insulin- states he has felt bad and girlfriend at work- states he didn't feel like drawing his insulin up and giving it to himself

## 2025-03-17 NOTE — ED NOTES
BMP repeat reviewed by MD including Glucose 265-MD states to leave Insulin gtt at 2 units per hour

## 2025-03-17 NOTE — ED NOTES
EKG in progress. Sinus tach 103- alert and oriented x3. Dr. Blankenship gave v/o to start Insulin gtt at 6 units per hour

## 2025-03-17 NOTE — ED NOTES
In to do FSBS- - new box strips opened- performing controls- No #2 control fluid- lab states don't have. EMS arrived to take pt. Report given to Karson Yo- pt is alert and oriented x3. Medic states can complete FSBS. Pt remains alert and oriented x4. Pleasant- no complaints. Sr at 100 on monitor. RR even and nonlabored. IV patent

## 2025-03-18 LAB
ANION GAP SERPL CALC-SCNC: 7 MMOL/L (ref 2–12)
ANION GAP SERPL CALC-SCNC: 8 MMOL/L (ref 2–12)
ANION GAP SERPL CALC-SCNC: 9 MMOL/L (ref 2–12)
BASOPHILS # BLD: 0 K/UL (ref 0–0.1)
BASOPHILS NFR BLD: 0 % (ref 0–1)
BUN SERPL-MCNC: 10 MG/DL (ref 6–20)
BUN SERPL-MCNC: 8 MG/DL (ref 6–20)
BUN SERPL-MCNC: 9 MG/DL (ref 6–20)
BUN/CREAT SERPL: 10 (ref 12–20)
BUN/CREAT SERPL: 11 (ref 12–20)
BUN/CREAT SERPL: 8 (ref 12–20)
CA-I BLD-MCNC: 8 MG/DL (ref 8.5–10.1)
CA-I BLD-MCNC: 8.2 MG/DL (ref 8.5–10.1)
CA-I BLD-MCNC: 8.3 MG/DL (ref 8.5–10.1)
CHLORIDE SERPL-SCNC: 108 MMOL/L (ref 97–108)
CHLORIDE SERPL-SCNC: 109 MMOL/L (ref 97–108)
CHLORIDE SERPL-SCNC: 109 MMOL/L (ref 97–108)
CO2 SERPL-SCNC: 18 MMOL/L (ref 21–32)
CO2 SERPL-SCNC: 19 MMOL/L (ref 21–32)
CO2 SERPL-SCNC: 19 MMOL/L (ref 21–32)
CREAT SERPL-MCNC: 0.84 MG/DL (ref 0.7–1.3)
CREAT SERPL-MCNC: 0.96 MG/DL (ref 0.7–1.3)
CREAT SERPL-MCNC: 0.99 MG/DL (ref 0.7–1.3)
DIFFERENTIAL METHOD BLD: ABNORMAL
EKG ATRIAL RATE: 103 BPM
EKG DIAGNOSIS: NORMAL
EKG P AXIS: 53 DEGREES
EKG P-R INTERVAL: 153 MS
EKG Q-T INTERVAL: 359 MS
EKG QRS DURATION: 91 MS
EKG QTC CALCULATION (BAZETT): 470 MS
EKG R AXIS: 3 DEGREES
EKG T AXIS: 62 DEGREES
EKG VENTRICULAR RATE: 103 BPM
EOSINOPHIL # BLD: 0 K/UL (ref 0–0.4)
EOSINOPHIL NFR BLD: 0 % (ref 0–7)
ERYTHROCYTE [DISTWIDTH] IN BLOOD BY AUTOMATED COUNT: 12.6 % (ref 11.5–14.5)
EST. AVERAGE GLUCOSE BLD GHB EST-MCNC: 335 MG/DL
EST. AVERAGE GLUCOSE BLD GHB EST-MCNC: 349 MG/DL
GLUCOSE BLD STRIP.AUTO-MCNC: 143 MG/DL (ref 65–100)
GLUCOSE BLD STRIP.AUTO-MCNC: 147 MG/DL (ref 65–100)
GLUCOSE BLD STRIP.AUTO-MCNC: 164 MG/DL (ref 65–100)
GLUCOSE BLD STRIP.AUTO-MCNC: 172 MG/DL (ref 65–100)
GLUCOSE BLD STRIP.AUTO-MCNC: 174 MG/DL (ref 65–100)
GLUCOSE BLD STRIP.AUTO-MCNC: 180 MG/DL (ref 65–100)
GLUCOSE BLD STRIP.AUTO-MCNC: 196 MG/DL (ref 65–100)
GLUCOSE BLD STRIP.AUTO-MCNC: 206 MG/DL (ref 65–100)
GLUCOSE BLD STRIP.AUTO-MCNC: 226 MG/DL (ref 65–100)
GLUCOSE BLD STRIP.AUTO-MCNC: 239 MG/DL (ref 65–100)
GLUCOSE BLD STRIP.AUTO-MCNC: 244 MG/DL (ref 65–100)
GLUCOSE BLD STRIP.AUTO-MCNC: 324 MG/DL (ref 65–100)
GLUCOSE SERPL-MCNC: 170 MG/DL (ref 65–100)
GLUCOSE SERPL-MCNC: 205 MG/DL (ref 65–100)
GLUCOSE SERPL-MCNC: 261 MG/DL (ref 65–100)
HBA1C MFR BLD: 13.3 % (ref 4–5.6)
HBA1C MFR BLD: 13.8 % (ref 4–5.6)
HCT VFR BLD AUTO: 37.3 % (ref 36.6–50.3)
HGB BLD-MCNC: 12.9 G/DL (ref 12.1–17)
IMM GRANULOCYTES # BLD AUTO: 0 K/UL
IMM GRANULOCYTES NFR BLD AUTO: 0 %
LYMPHOCYTES # BLD: 2.27 K/UL (ref 0.8–3.5)
LYMPHOCYTES NFR BLD: 32 % (ref 12–49)
MAGNESIUM SERPL-MCNC: 2.1 MG/DL (ref 1.6–2.4)
MAGNESIUM SERPL-MCNC: 2.2 MG/DL (ref 1.6–2.4)
MCH RBC QN AUTO: 30.1 PG (ref 26–34)
MCHC RBC AUTO-ENTMCNC: 34.6 G/DL (ref 30–36.5)
MCV RBC AUTO: 86.9 FL (ref 80–99)
MONOCYTES # BLD: 0.5 K/UL (ref 0–1)
MONOCYTES NFR BLD: 7 % (ref 5–13)
MYELOCYTES NFR BLD MANUAL: 2 %
NEUTS BAND NFR BLD MANUAL: 3 % (ref 0–6)
NEUTS SEG # BLD: 4.19 K/UL (ref 1.8–8)
NEUTS SEG NFR BLD: 56 % (ref 32–75)
NRBC # BLD: 0 K/UL (ref 0–0.01)
NRBC BLD-RTO: 0 PER 100 WBC
PERFORMED BY:: ABNORMAL
PHOSPHATE SERPL-MCNC: 1.4 MG/DL (ref 2.6–4.7)
PHOSPHATE SERPL-MCNC: 1.7 MG/DL (ref 2.6–4.7)
PHOSPHATE SERPL-MCNC: 2.2 MG/DL (ref 2.6–4.7)
PLATELET # BLD AUTO: 102 K/UL (ref 150–400)
PMV BLD AUTO: 10.8 FL (ref 8.9–12.9)
POTASSIUM SERPL-SCNC: 3.4 MMOL/L (ref 3.5–5.1)
POTASSIUM SERPL-SCNC: 3.6 MMOL/L (ref 3.5–5.1)
POTASSIUM SERPL-SCNC: 3.9 MMOL/L (ref 3.5–5.1)
RBC # BLD AUTO: 4.29 M/UL (ref 4.1–5.7)
RBC MORPH BLD: ABNORMAL
SODIUM SERPL-SCNC: 134 MMOL/L (ref 136–145)
SODIUM SERPL-SCNC: 136 MMOL/L (ref 136–145)
SODIUM SERPL-SCNC: 136 MMOL/L (ref 136–145)
WBC # BLD AUTO: 7.1 K/UL (ref 4.1–11.1)

## 2025-03-18 PROCEDURE — 84100 ASSAY OF PHOSPHORUS: CPT

## 2025-03-18 PROCEDURE — 6370000000 HC RX 637 (ALT 250 FOR IP): Performed by: STUDENT IN AN ORGANIZED HEALTH CARE EDUCATION/TRAINING PROGRAM

## 2025-03-18 PROCEDURE — 85025 COMPLETE CBC W/AUTO DIFF WBC: CPT

## 2025-03-18 PROCEDURE — 6360000002 HC RX W HCPCS: Performed by: NURSE PRACTITIONER

## 2025-03-18 PROCEDURE — 82962 GLUCOSE BLOOD TEST: CPT

## 2025-03-18 PROCEDURE — 83036 HEMOGLOBIN GLYCOSYLATED A1C: CPT

## 2025-03-18 PROCEDURE — 2500000003 HC RX 250 WO HCPCS: Performed by: STUDENT IN AN ORGANIZED HEALTH CARE EDUCATION/TRAINING PROGRAM

## 2025-03-18 PROCEDURE — 2500000003 HC RX 250 WO HCPCS: Performed by: NURSE PRACTITIONER

## 2025-03-18 PROCEDURE — 80048 BASIC METABOLIC PNL TOTAL CA: CPT

## 2025-03-18 PROCEDURE — 36415 COLL VENOUS BLD VENIPUNCTURE: CPT

## 2025-03-18 PROCEDURE — 2580000003 HC RX 258: Performed by: STUDENT IN AN ORGANIZED HEALTH CARE EDUCATION/TRAINING PROGRAM

## 2025-03-18 PROCEDURE — 2000000000 HC ICU R&B

## 2025-03-18 PROCEDURE — 2580000003 HC RX 258: Performed by: NURSE PRACTITIONER

## 2025-03-18 PROCEDURE — 83735 ASSAY OF MAGNESIUM: CPT

## 2025-03-18 PROCEDURE — 6360000002 HC RX W HCPCS: Performed by: STUDENT IN AN ORGANIZED HEALTH CARE EDUCATION/TRAINING PROGRAM

## 2025-03-18 PROCEDURE — 6370000000 HC RX 637 (ALT 250 FOR IP)

## 2025-03-18 PROCEDURE — 6370000000 HC RX 637 (ALT 250 FOR IP): Performed by: NURSE PRACTITIONER

## 2025-03-18 RX ORDER — GLUCAGON 1 MG/ML
1 KIT INJECTION PRN
Status: DISCONTINUED | OUTPATIENT
Start: 2025-03-18 | End: 2025-03-19 | Stop reason: HOSPADM

## 2025-03-18 RX ORDER — INSULIN LISPRO 100 [IU]/ML
0-16 INJECTION, SOLUTION INTRAVENOUS; SUBCUTANEOUS
Status: DISCONTINUED | OUTPATIENT
Start: 2025-03-18 | End: 2025-03-19 | Stop reason: HOSPADM

## 2025-03-18 RX ORDER — POTASSIUM CHLORIDE 1500 MG/1
40 TABLET, EXTENDED RELEASE ORAL ONCE
Status: COMPLETED | OUTPATIENT
Start: 2025-03-18 | End: 2025-03-18

## 2025-03-18 RX ORDER — INSULIN LISPRO 100 [IU]/ML
0.08 INJECTION, SOLUTION INTRAVENOUS; SUBCUTANEOUS
Status: DISCONTINUED | OUTPATIENT
Start: 2025-03-18 | End: 2025-03-19 | Stop reason: HOSPADM

## 2025-03-18 RX ORDER — INSULIN GLARGINE 100 [IU]/ML
15 INJECTION, SOLUTION SUBCUTANEOUS 2 TIMES DAILY
Status: DISCONTINUED | OUTPATIENT
Start: 2025-03-18 | End: 2025-03-19

## 2025-03-18 RX ORDER — MAGNESIUM SULFATE IN WATER 40 MG/ML
2000 INJECTION, SOLUTION INTRAVENOUS ONCE
Status: COMPLETED | OUTPATIENT
Start: 2025-03-18 | End: 2025-03-18

## 2025-03-18 RX ADMIN — DEXTROSE AND SODIUM CHLORIDE: 5; .45 INJECTION, SOLUTION INTRAVENOUS at 05:15

## 2025-03-18 RX ADMIN — INSULIN LISPRO 5 UNITS: 100 INJECTION, SOLUTION INTRAVENOUS; SUBCUTANEOUS at 17:20

## 2025-03-18 RX ADMIN — INSULIN LISPRO 12 UNITS: 100 INJECTION, SOLUTION INTRAVENOUS; SUBCUTANEOUS at 17:21

## 2025-03-18 RX ADMIN — POTASSIUM PHOSPHATE, MONOBASIC POTASSIUM PHOSPHATE, DIBASIC 20 MMOL: 224; 236 INJECTION, SOLUTION, CONCENTRATE INTRAVENOUS at 08:45

## 2025-03-18 RX ADMIN — POTASSIUM CHLORIDE 40 MEQ: 1500 TABLET, EXTENDED RELEASE ORAL at 00:12

## 2025-03-18 RX ADMIN — SODIUM CHLORIDE, PRESERVATIVE FREE 10 ML: 5 INJECTION INTRAVENOUS at 08:45

## 2025-03-18 RX ADMIN — POTASSIUM PHOSPHATE, MONOBASIC POTASSIUM PHOSPHATE, DIBASIC 30 MMOL: 224; 236 INJECTION, SOLUTION, CONCENTRATE INTRAVENOUS at 01:06

## 2025-03-18 RX ADMIN — OSELTAMIVIR PHOSPHATE 75 MG: 75 CAPSULE ORAL at 20:26

## 2025-03-18 RX ADMIN — INSULIN GLARGINE 15 UNITS: 100 INJECTION, SOLUTION SUBCUTANEOUS at 17:28

## 2025-03-18 RX ADMIN — ENOXAPARIN SODIUM 40 MG: 100 INJECTION SUBCUTANEOUS at 08:41

## 2025-03-18 RX ADMIN — INSULIN HUMAN 20 UNITS: 100 INJECTION, SUSPENSION SUBCUTANEOUS at 06:19

## 2025-03-18 RX ADMIN — Medication 250 MG: at 18:25

## 2025-03-18 RX ADMIN — INSULIN LISPRO 5 UNITS: 100 INJECTION, SOLUTION INTRAVENOUS; SUBCUTANEOUS at 12:57

## 2025-03-18 RX ADMIN — MAGNESIUM SULFATE HEPTAHYDRATE 2000 MG: 40 INJECTION, SOLUTION INTRAVENOUS at 00:12

## 2025-03-18 RX ADMIN — OSELTAMIVIR PHOSPHATE 75 MG: 75 CAPSULE ORAL at 08:41

## 2025-03-18 RX ADMIN — SODIUM CHLORIDE: 0.9 INJECTION, SOLUTION INTRAVENOUS at 00:12

## 2025-03-18 ASSESSMENT — PAIN SCALES - GENERAL
PAINLEVEL_OUTOF10: 0
PAINLEVEL_OUTOF10: 0
PAINLEVEL_OUTOF10: 5
PAINLEVEL_OUTOF10: 7
PAINLEVEL_OUTOF10: 0

## 2025-03-18 NOTE — H&P
CRITICAL CARE ADMISSION NOTE    Name: Gorge Balderas Jr.   : 1980   MRN: [id number]   Date: 3/17/2025        ICU PROBLEM LIST   DKA  Influenza B+    HISTORY OF PRESENT ILLNESS:   Gorge Balderas Jr. is a 44 y.o. with a PMH of T1DM who presents to [facility] ICU as a transfer from  ED with a diagnosis of DKA.  Patient reports fatigue, dizziness and fever over the past 2 to 3 days after sick contact with his daughter.  He reports that he also has been unable to take his insulin due to his sick symptoms.  He presented to  ED with laboratory workup consistent with DKA (, CO2 10, AG 25, pH 7.16) and PCR positive for influenza B.  Tamiflu was administered and insulin infusion initiated.  [facility] ICU contacted for admission.     NEUROLOGICAL:    No active issues  -Alert and oriented    PULMONOLOGY:   No active issues  -CXR without acute cardiopulmonary process     CARDIOVASCULAR:   No active issues  -Hemodynamically stable  -Initial troponin negative, repeat now  -TTE 2022 LVEF 55, question of distal septal hypokinesis and diastolic dysfunction    GASTROINTESTINAL:   No active issues  -N.p.o. while on insulin infusion  -Will advance diet once transitioned     RENAL/ELECTROLYTE/FLUIDS:   HUBER  -SCr 1.52, baseline ~ 0.7  -IV hydration  -Trend BMP/lytes    ENDOCRINE:   T1DM in [facility]  -Reported medical noncompliance + Flu  -Insulin infusion per DKA protocol  -Trend labs every 4  -Previous A1c 13.7% (2024)    HEMATOLOGY/ONCOLOGY:   No active issues   -Trend CBC    ID/MICRO:   Influenza B+  -CXR without focal infiltrates, doubtful superimposed bacterial pneumonia  -Continue Tamiflu  -Trend CBC and fever curve      Is this patient to be included in the SEP-1 core measure? No Exclusion criteria - the patient is NOT to be included for SEP-1 Core Measure due to: Viral etiology found or highly suspected (including COVID-19) without concomitant bacterial infection       ICU DAILY CHECKLIST     Code Status:

## 2025-03-18 NOTE — CONSULTS
Nutrition Education    Educated on Diabetes Diet Guidelines  Learners: Patient and Significant Other  Readiness: Acceptance  Method: Explanation, Demonstration, and Handout  Response: Verbalizes Understanding  Discussed pt's usual daily intake: pt states he fasts in the morning until around 12pm (~16 hour fast).   12PM: lunch-meat of some kind, food high in fiber  Does not snack, drinks water or unsweetened tea  Yerba Mate beverage sweetened w/ stevia  7PM: dinner-meat of some kind, vegetable, and sometimes potatoes (limits portion size)  Reports 25 u Novolin N nightly  Discussed w/ pt benefits of smaller meals throughout the day for BG control and reviewed MyPlate method for DM. Pt expressed understanding. Pt has also lost weight over the past year. Encouraged pt to try to maintain current body weight as he is in healthy BMI range and further wt loss will likely not improve diabetes management. Referred pt to outpatient diabetes program. Encouraged pt to follow up w/ endocrinology.  Contact name and number provided.    Zena Brownlee RD  Contact Number: [phone]

## 2025-03-18 NOTE — CARE COORDINATION
Per IDR    Pt admitted for DKA.    A&Ox4    Pt on room air.     Pt has not insurance    Pt can transfer to the floor

## 2025-03-18 NOTE — CARE COORDINATION
03/18/25 1123   Service Assessment   Patient Orientation Alert and Oriented   Cognition Alert   History Provided By Patient   Primary Caregiver Self   Support Systems Spouse/Significant Other;Parent;Children;Family Members;Friends/Neighbors   Patient's Healthcare Decision Maker is: Legal Next of Kin   PCP Verified by CM Yes   Last Visit to PCP Within last 6 months   Prior Functional Level Independent in ADLs/IADLs   Current Functional Level Independent in ADLs/IADLs   Can patient return to prior living arrangement Yes   Ability to make needs known: Good   Family able to assist with home care needs: Yes   Would you like for me to discuss the discharge plan with any other family members/significant others, and if so, who? Yes   Financial Resources Medicaid   Community Resources None   Social/Functional History   Lives With Spouse;Family   Services At/After Discharge   Mode of Transport at Discharge Other (see comment)   Confirm Follow Up Transport Family     CM met f/f with Pt and his wife, Pt stated that his address has changed to: [address], [place]. [address]  Pt stated that his lives with his wife, their four children and his wife's parents.     Pt stated that he just got off the phone with Medicaid and they state that it will be re-instated in 24 hours.  Pt stated that he has been without insurance for over a year.   Pt gave CM the Medicaid #.    Medicaid # [id number]    No HH, no DME and independent with ADL    [facility] [place].     Family will transport Pt home.    CM dispo: home NN    Advance Care Planning     General Advance Care Planning (ACP) Conversation    Date of Conversation: 3/18/2025      Healthcare Decision Maker:   Primary Decision Maker: TedRachael - Girlfriend - [phone]       Content/Action Overview:    Reviewed DNR/DNI and patient elects Full Code (Attempt Resuscitation)

## 2025-03-19 VITALS
TEMPERATURE: 97.6 F | SYSTOLIC BLOOD PRESSURE: 106 MMHG | HEIGHT: 66 IN | OXYGEN SATURATION: 97 % | RESPIRATION RATE: 15 BRPM | BODY MASS INDEX: 24.2 KG/M2 | HEART RATE: 77 BPM | DIASTOLIC BLOOD PRESSURE: 73 MMHG | WEIGHT: 150.57 LBS

## 2025-03-19 LAB
ANION GAP SERPL CALC-SCNC: 8 MMOL/L (ref 2–12)
BASOPHILS # BLD: 0.05 K/UL (ref 0–0.1)
BASOPHILS NFR BLD: 1 % (ref 0–1)
BUN SERPL-MCNC: 7 MG/DL (ref 6–20)
BUN/CREAT SERPL: 11 (ref 12–20)
CA-I BLD-MCNC: 8.5 MG/DL (ref 8.5–10.1)
CHLORIDE SERPL-SCNC: 107 MMOL/L (ref 97–108)
CO2 SERPL-SCNC: 23 MMOL/L (ref 21–32)
CREAT SERPL-MCNC: 0.64 MG/DL (ref 0.7–1.3)
DIFFERENTIAL METHOD BLD: ABNORMAL
EOSINOPHIL # BLD: 0 K/UL (ref 0–0.4)
EOSINOPHIL NFR BLD: 0 % (ref 0–7)
ERYTHROCYTE [DISTWIDTH] IN BLOOD BY AUTOMATED COUNT: 12.4 % (ref 11.5–14.5)
GLUCOSE BLD STRIP.AUTO-MCNC: 150 MG/DL (ref 65–100)
GLUCOSE BLD STRIP.AUTO-MCNC: 227 MG/DL (ref 65–100)
GLUCOSE BLD STRIP.AUTO-MCNC: 230 MG/DL (ref 65–100)
GLUCOSE BLD STRIP.AUTO-MCNC: 300 MG/DL (ref 65–100)
GLUCOSE SERPL-MCNC: 208 MG/DL (ref 65–100)
HCT VFR BLD AUTO: 34.6 % (ref 36.6–50.3)
HGB BLD-MCNC: 12.3 G/DL (ref 12.1–17)
IMM GRANULOCYTES # BLD AUTO: 0 K/UL
IMM GRANULOCYTES NFR BLD AUTO: 0 %
LYMPHOCYTES # BLD: 1.49 K/UL (ref 0.8–3.5)
LYMPHOCYTES NFR BLD: 31 % (ref 12–49)
MAGNESIUM SERPL-MCNC: 1.9 MG/DL (ref 1.6–2.4)
MAGNESIUM SERPL-MCNC: 2 MG/DL (ref 1.6–2.4)
MCH RBC QN AUTO: 30.1 PG (ref 26–34)
MCHC RBC AUTO-ENTMCNC: 35.5 G/DL (ref 30–36.5)
MCV RBC AUTO: 84.6 FL (ref 80–99)
MONOCYTES # BLD: 0.38 K/UL (ref 0–1)
MONOCYTES NFR BLD: 8 % (ref 5–13)
NEUTS SEG # BLD: 2.88 K/UL (ref 1.8–8)
NEUTS SEG NFR BLD: 60 % (ref 32–75)
NRBC # BLD: 0 K/UL (ref 0–0.01)
NRBC BLD-RTO: 0 PER 100 WBC
PERFORMED BY:: ABNORMAL
PHOSPHATE SERPL-MCNC: 1.9 MG/DL (ref 2.6–4.7)
PHOSPHATE SERPL-MCNC: 1.9 MG/DL (ref 2.6–4.7)
PHOSPHATE SERPL-MCNC: 3.8 MG/DL (ref 2.6–4.7)
PLATELET # BLD AUTO: 89 K/UL (ref 150–400)
PMV BLD AUTO: 10.4 FL (ref 8.9–12.9)
POTASSIUM SERPL-SCNC: 3 MMOL/L (ref 3.5–5.1)
POTASSIUM SERPL-SCNC: 3.4 MMOL/L (ref 3.5–5.1)
RBC # BLD AUTO: 4.09 M/UL (ref 4.1–5.7)
RBC MORPH BLD: ABNORMAL
SODIUM SERPL-SCNC: 138 MMOL/L (ref 136–145)
WBC # BLD AUTO: 4.8 K/UL (ref 4.1–11.1)

## 2025-03-19 PROCEDURE — 6370000000 HC RX 637 (ALT 250 FOR IP): Performed by: NURSE PRACTITIONER

## 2025-03-19 PROCEDURE — 6370000000 HC RX 637 (ALT 250 FOR IP): Performed by: STUDENT IN AN ORGANIZED HEALTH CARE EDUCATION/TRAINING PROGRAM

## 2025-03-19 PROCEDURE — 82962 GLUCOSE BLOOD TEST: CPT

## 2025-03-19 PROCEDURE — 84132 ASSAY OF SERUM POTASSIUM: CPT

## 2025-03-19 PROCEDURE — 2580000003 HC RX 258

## 2025-03-19 PROCEDURE — 2500000003 HC RX 250 WO HCPCS

## 2025-03-19 PROCEDURE — 6370000000 HC RX 637 (ALT 250 FOR IP)

## 2025-03-19 PROCEDURE — 84100 ASSAY OF PHOSPHORUS: CPT

## 2025-03-19 PROCEDURE — 85025 COMPLETE CBC W/AUTO DIFF WBC: CPT

## 2025-03-19 PROCEDURE — 6360000002 HC RX W HCPCS: Performed by: STUDENT IN AN ORGANIZED HEALTH CARE EDUCATION/TRAINING PROGRAM

## 2025-03-19 PROCEDURE — 83735 ASSAY OF MAGNESIUM: CPT

## 2025-03-19 PROCEDURE — 80048 BASIC METABOLIC PNL TOTAL CA: CPT

## 2025-03-19 PROCEDURE — 36415 COLL VENOUS BLD VENIPUNCTURE: CPT

## 2025-03-19 RX ORDER — INSULIN LISPRO 100 [IU]/ML
0.08 INJECTION, SOLUTION INTRAVENOUS; SUBCUTANEOUS
Qty: 3 ML | Refills: 0 | Status: SHIPPED | OUTPATIENT
Start: 2025-03-19 | End: 2025-04-08

## 2025-03-19 RX ORDER — INSULIN GLARGINE 100 [IU]/ML
20 INJECTION, SOLUTION SUBCUTANEOUS 2 TIMES DAILY
Status: DISCONTINUED | OUTPATIENT
Start: 2025-03-19 | End: 2025-03-19 | Stop reason: HOSPADM

## 2025-03-19 RX ORDER — INSULIN GLARGINE 100 [IU]/ML
20 INJECTION, SOLUTION SUBCUTANEOUS 2 TIMES DAILY
Qty: 10 ML | Refills: 3 | Status: SHIPPED | OUTPATIENT
Start: 2025-03-19

## 2025-03-19 RX ORDER — POTASSIUM CHLORIDE 1500 MG/1
40 TABLET, EXTENDED RELEASE ORAL 2 TIMES DAILY
Status: DISCONTINUED | OUTPATIENT
Start: 2025-03-19 | End: 2025-03-19 | Stop reason: HOSPADM

## 2025-03-19 RX ORDER — OSELTAMIVIR PHOSPHATE 75 MG/1
75 CAPSULE ORAL 2 TIMES DAILY
Qty: 5 CAPSULE | Refills: 0 | Status: SHIPPED | OUTPATIENT
Start: 2025-03-19 | End: 2025-03-22

## 2025-03-19 RX ADMIN — INSULIN GLARGINE 15 UNITS: 100 INJECTION, SOLUTION SUBCUTANEOUS at 08:08

## 2025-03-19 RX ADMIN — INSULIN LISPRO 5 UNITS: 100 INJECTION, SOLUTION INTRAVENOUS; SUBCUTANEOUS at 12:47

## 2025-03-19 RX ADMIN — POTASSIUM CHLORIDE 40 MEQ: 1500 TABLET, EXTENDED RELEASE ORAL at 08:14

## 2025-03-19 RX ADMIN — INSULIN LISPRO 5 UNITS: 100 INJECTION, SOLUTION INTRAVENOUS; SUBCUTANEOUS at 17:43

## 2025-03-19 RX ADMIN — INSULIN LISPRO 5 UNITS: 100 INJECTION, SOLUTION INTRAVENOUS; SUBCUTANEOUS at 08:13

## 2025-03-19 RX ADMIN — OSELTAMIVIR PHOSPHATE 75 MG: 75 CAPSULE ORAL at 08:14

## 2025-03-19 RX ADMIN — SODIUM PHOSPHATE, MONOBASIC, MONOHYDRATE AND SODIUM PHOSPHATE, DIBASIC, ANHYDROUS 15 MMOL: 142; 276 INJECTION, SOLUTION INTRAVENOUS at 11:22

## 2025-03-19 RX ADMIN — ENOXAPARIN SODIUM 40 MG: 100 INJECTION SUBCUTANEOUS at 08:13

## 2025-03-19 RX ADMIN — Medication 250 MG: at 08:19

## 2025-03-19 RX ADMIN — INSULIN LISPRO 8 UNITS: 100 INJECTION, SOLUTION INTRAVENOUS; SUBCUTANEOUS at 08:13

## 2025-03-19 RX ADMIN — Medication 250 MG: at 16:17

## 2025-03-19 ASSESSMENT — PAIN SCALES - GENERAL
PAINLEVEL_OUTOF10: 0

## 2025-03-19 NOTE — PLAN OF CARE
Problem: Chronic Conditions and Co-morbidities  Goal: Patient's chronic conditions and co-morbidity symptoms are monitored and maintained or improved  Outcome: Progressing     Problem: Discharge Planning  Goal: Discharge to home or other facility with appropriate resources  Outcome: Progressing     Problem: Safety - Adult  Goal: Free from fall injury  Outcome: Progressing     Problem: Pain  Goal: Verbalizes/displays adequate comfort level or baseline comfort level  Outcome: Progressing     Problem: Infection - Adult  Goal: Absence of infection at discharge  Outcome: Progressing     Problem: Metabolic/Fluid and Electrolytes - Adult  Goal: Electrolytes maintained within normal limits  Outcome: Progressing     Problem: Hematologic - Adult  Goal: Maintains hematologic stability  Outcome: Progressing     
  Problem: Chronic Conditions and Co-morbidities  Goal: Patient's chronic conditions and co-morbidity symptoms are monitored and maintained or improved  Outcome: Progressing  Flowsheets  Taken 3/18/2025 2000 by Allegra Aguilar RN  Care Plan - Patient's Chronic Conditions and Co-Morbidity Symptoms are Monitored and Maintained or Improved: Monitor and assess patient's chronic conditions and comorbid symptoms for stability, deterioration, or improvement  Taken 3/18/2025 0800 by Sidra Jovel RN  Care Plan - Patient's Chronic Conditions and Co-Morbidity Symptoms are Monitored and Maintained or Improved:   Monitor and assess patient's chronic conditions and comorbid symptoms for stability, deterioration, or improvement   Collaborate with multidisciplinary team to address chronic and comorbid conditions and prevent exacerbation or deterioration     Problem: Safety - Adult  Goal: Free from fall injury  Outcome: Progressing     Problem: Pain  Goal: Verbalizes/displays adequate comfort level or baseline comfort level  Outcome: Progressing  Flowsheets  Taken 3/18/2025 2000 by Allegra Aguilar RN  Verbalizes/displays adequate comfort level or baseline comfort level: Assess pain using appropriate pain scale  Taken 3/18/2025 0800 by Sidra Jovel RN  Verbalizes/displays adequate comfort level or baseline comfort level: Encourage patient to monitor pain and request assistance     Problem: Metabolic/Fluid and Electrolytes - Adult  Goal: Electrolytes maintained within normal limits  Outcome: Progressing  Flowsheets  Taken 3/18/2025 2000 by Allegra Aguilar RN  Electrolytes maintained within normal limits:   Monitor labs and assess patient for signs and symptoms of electrolyte imbalances   Administer electrolyte replacement as ordered   Monitor response to electrolyte replacements, including repeat lab results as appropriate  Taken 3/18/2025 0800 by Sidra Jovel RN  Electrolytes maintained within normal limits:   Monitor 
  Problem: Safety - Adult  Goal: Free from fall injury  Outcome: Progressing     Problem: Pain  Goal: Verbalizes/displays adequate comfort level or baseline comfort level  Outcome: Progressing     Problem: Chronic Conditions and Co-morbidities  Goal: Patient's chronic conditions and co-morbidity symptoms are monitored and maintained or improved  Outcome: Progressing  Flowsheets (Taken 3/17/2025 2030)  Care Plan - Patient's Chronic Conditions and Co-Morbidity Symptoms are Monitored and Maintained or Improved: Monitor and assess patient's chronic conditions and comorbid symptoms for stability, deterioration, or improvement     
hematologic stability  Recent Flowsheet Documentation  Taken 3/18/2025 0800 by Sidra Jovel, RN  Maintains hematologic stability:   Assess for signs and symptoms of bleeding or hemorrhage   Monitor labs for bleeding or clotting disorders

## 2025-03-19 NOTE — CARE COORDINATION
CM reviewed Pt medicals, Pt lives with his wife, four children and his wife's parents.    Pt is IND with ADL.    CM dispo: home NN    10:55  Received a D/C order. Pt will D/C home with no CM needs identified.    Pt wife will transport Pt home.     Transition of Care Plan:    RUR: 8%  Prior Level of Functioning: IND  Disposition: Home NN  SHELLY: 3/19    Follow up appointments: Nursing  DME needed: No  Transportation at discharge: Family  IM/IMM Medicare/ letter given: No    Caregiver Contact: No  Discharge Caregiver contacted prior to discharge? No  Care Conference needed? no  Barriers to discharge: none     Infusion Nursing Note:  Agapito Fairbanks presents today for IVF.    Patient seen by provider today: No    Note: Patient reports continuing left shoulder pain of 7/10.  Also continues to report decreased appetite and fatigue.  Denies any new concerns today.      Intravenous Access:  Peripheral IV placed.      Treatment Conditions:  Not Applicable.      Post Infusion Assessment:  Patient tolerated infusion without incident.  Blood return noted pre and post infusion.  Site patent and intact, free from redness, edema or discomfort.  No evidence of extravasations.  Access discontinued per protocol.    Discharge Plan:   Patient declined prescription refills.  Discharge instructions reviewed with: Patient.  Patient and/or family verbalized understanding of discharge instructions and all questions answered.  Copy of AVS reviewed with patient and/or family.  Patient will return 8/16/20 for IVF for next appointment.  Patient discharged in stable condition accompanied by: self.  Departure Mode: Ambulatory with walker.    Mariya Connors RN

## 2025-03-19 NOTE — PROGRESS NOTES
Hospitalist Progress Note               Daily Progress Note: 3/18/2025      Hospital Day: 2     Chief complaint: No chief complaint on file.       Brief HPI/ Hospital course to date:  Gorge Balderas Jr. is a 44 y.o. with a PMH of T1DM who presents to [facility] ICU as a transfer from  ED with a diagnosis of DKA.  Patient reports fatigue, dizziness and fever over the past 2 to 3 days after sick contact with his daughter.  He reports that he also has been unable to take his insulin due to his sick symptoms.  He presented to  ED with laboratory workup consistent with DKA (, CO2 10, AG 25, pH 7.16) and PCR positive for influenza B.  Tamiflu was administered and insulin infusion initiated.  [facility] ICU contacted for admission.        --------  Patient is seen today for follow-up.   He is resting comfortably in bed on the phone.  He denies any headache, chest pain/palpitations, worsening shortness of breath, dumping, urinary symptoms.  Kidney states he will be transition out of the ICU and has no questions or concerns.    All ROS negative otherwise mentioned above.      Assessment and Plan:    DKA  -Patient presented with blood glucose 450, CO2 10, anion gap 25, pH 7.16 and was not taking insulin due to feeling sick  -Off insulin drip as AG x2 closed  -Continue Lantus 15 units BID and lispro 5 units 3 times daily with meals  -A1c 13.3  -Continue POC glucose checks, sliding scale, and hypoglycemia protocol  -Continue to titrate insulin for correction  -Need to follow-up closely with PCP    Influenza B  -Continue Tamiflu  -On RA    Metabolic acidosis, due to DKA  -CO2 improved to 19 I will continue to monitor closely    Hypophosphatemia  -Phosphorus corrected to 2.2  -K-Phos ordered    Code status: Full code    Social determinants of health: none      Estimated discharge date//time frame/disposition:      Barriers to discharge:       Objective:   Physical Exam:     /76   Pulse 99   Temp 98.6 °F (37 °C) (Oral)   
1800- Discharge paper reviewed with pt and wife , verbalized understanding, belongings packed, IV removed patient discharged.   
procedural time which has been billed separately.    Tracey Kay MD  Critical Care Medicine  [facility]

## 2025-03-19 NOTE — DISCHARGE SUMMARY
Physician Discharge Summary     Patient ID:    Gorge Balderas Jr.  [id number]  44 y.o.  1980    Admit date: 3/17/2025    Discharge date : 3/19/2025      Final Diagnoses:   DKA, type 1, not at goal (HCC) [E10.10]  Influenza A  Hypokalemia  Hypophosphatemia  Metabolic acidosis    Reason for Hospitalization:        Hospital Course:     Gorge Balderas Jr. is a 44 y.o. with a PMH of T1DM who presents to [facility] ICU as a transfer from  ED with a diagnosis of DKA. Patient reports fatigue, dizziness and fever over the past 2 to 3 days after sick contact with his daughter. He reports that he also has been unable to take his insulin due to his sick symptoms. He presented to  ED with laboratory workup consistent with DKA (, CO2 10, AG 25, pH 7.16) and PCR positive for influenza B. Tamiflu was administered and insulin infusion initiated. [facility] ICU contacted for admission.   Patient was started on insulin drip and transition to insulin.  His sugars continue to remain controlled.  Insulin was titrated up prior to discharge.  Otherwise, patient also noted to be hypokalemic and was given potassium.  Potassium elevated 3.4 and he was given additional dose of potassium before discharge.  Patient's phosphorus level also improved with K-Phos tablets.  He reported feeling much better and desired to go home.  He had no other concerns.  Nurse at bedside no concerns.      Educated patient extensively about diabetes.  He states that he is comfortable with needles and syringes he has taken this at home before.  Therefore, patient was discharged with Lantus 20 units twice daily along with 5 units with meals.  He has a follow-up appointment tomorrow with his PCP at 2 PM, per patient.  He will help him titrate his insulin.  He will call back if any issues with insulin supplies as he states that he has everything at home.  He is also to continue the Tamiflu.  Of note, his pioglitazone was not discontinued but he was not

## 2025-03-20 ENCOUNTER — TELEPHONE (OUTPATIENT)
Facility: CLINIC | Age: 45
End: 2025-03-20

## 2025-03-20 NOTE — TELEPHONE ENCOUNTER
Care Transitions Initial Follow Up Call    Outreach made within 2 business days of discharge: Yes    Patient: Gorge Balderas Jr. Patient : 1980   MRN: [id number]  Reason for Admission: DKA  Discharge Date: 3/19/25       Spoke with: LVM    Discharge department/facility:         Scheduled appointment with PCP within 7-14 days    Follow Up  Future Appointments   Date Time Provider Department Center   3/27/2025  4:15 PM Janine Piedra APRN - NP [facility] DE[facility]       Sondra Hunter LPN

## 2025-03-21 ENCOUNTER — TELEPHONE (OUTPATIENT)
Facility: CLINIC | Age: 45
End: 2025-03-21

## 2025-03-21 LAB
BACTERIA SPEC CULT: NORMAL
Lab: NORMAL

## 2025-03-21 NOTE — TELEPHONE ENCOUNTER
Care Transitions Initial Follow Up Call    Outreach made within 2 business days of discharge: Yes    Patient: Gorge Balderas Jr. Patient : 1980   MRN: [id number]  Reason for Admission: DKA  Discharge Date: 3/19/25       Spoke with: pt    Discharge department/facility: [facility] Interactive Patient Contact:  Was patient able to fill all prescriptions: Yes  Was patient instructed to bring all medications to the follow-up visit: Yes  Is patient taking all medications as directed in the discharge summary? Yes  Does patient understand their discharge instructions: Yes  Does patient have questions or concerns that need addressed prior to 7-14 day follow up office visit: no    Additional needs identified to be addressed with provider  No needs identified             Scheduled appointment with PCP within 7-14 days    Follow Up  Future Appointments   Date Time Provider Department Center   3/27/2025  4:15 PM Janine Piedra APRN - NP [facility] DEP       Sondra Hunter LPN

## 2025-03-27 ENCOUNTER — OFFICE VISIT (OUTPATIENT)
Facility: CLINIC | Age: 45
End: 2025-03-27
Payer: MEDICAID

## 2025-03-27 VITALS
HEIGHT: 66 IN | SYSTOLIC BLOOD PRESSURE: 97 MMHG | DIASTOLIC BLOOD PRESSURE: 62 MMHG | BODY MASS INDEX: 25.63 KG/M2 | RESPIRATION RATE: 18 BRPM | OXYGEN SATURATION: 94 % | TEMPERATURE: 97.8 F | WEIGHT: 159.5 LBS | HEART RATE: 94 BPM

## 2025-03-27 DIAGNOSIS — Z09 HOSPITAL DISCHARGE FOLLOW-UP: ICD-10-CM

## 2025-03-27 DIAGNOSIS — J11.1 INFLUENZA: ICD-10-CM

## 2025-03-27 DIAGNOSIS — E11.65 UNCONTROLLED TYPE 2 DIABETES MELLITUS WITH HYPERGLYCEMIA (HCC): Primary | ICD-10-CM

## 2025-03-27 PROCEDURE — 3046F HEMOGLOBIN A1C LEVEL >9.0%: CPT | Performed by: NURSE PRACTITIONER

## 2025-03-27 PROCEDURE — 1111F DSCHRG MED/CURRENT MED MERGE: CPT | Performed by: NURSE PRACTITIONER

## 2025-03-27 PROCEDURE — 99213 OFFICE O/P EST LOW 20 MIN: CPT | Performed by: NURSE PRACTITIONER

## 2025-03-27 ASSESSMENT — ENCOUNTER SYMPTOMS
EYE ITCHING: 0
ABDOMINAL DISTENTION: 0
STRIDOR: 0
APNEA: 0
ABDOMINAL PAIN: 0
EYE PAIN: 0
WHEEZING: 0
BLOOD IN STOOL: 0
CHOKING: 0
EYE REDNESS: 0
PHOTOPHOBIA: 0
NAUSEA: 0
BACK PAIN: 0
COUGH: 0
SHORTNESS OF BREATH: 0
CONSTIPATION: 0
VOMITING: 0
FACIAL SWELLING: 0
SINUS PAIN: 0
EYE DISCHARGE: 0
TROUBLE SWALLOWING: 0
DIARRHEA: 0
CHEST TIGHTNESS: 0
SORE THROAT: 0
COLOR CHANGE: 0

## 2025-03-27 ASSESSMENT — PATIENT HEALTH QUESTIONNAIRE - PHQ9
1. LITTLE INTEREST OR PLEASURE IN DOING THINGS: NOT AT ALL
SUM OF ALL RESPONSES TO PHQ QUESTIONS 1-9: 0
SUM OF ALL RESPONSES TO PHQ QUESTIONS 1-9: 0
2. FEELING DOWN, DEPRESSED OR HOPELESS: NOT AT ALL
SUM OF ALL RESPONSES TO PHQ QUESTIONS 1-9: 0
SUM OF ALL RESPONSES TO PHQ QUESTIONS 1-9: 0

## 2025-03-27 NOTE — PROGRESS NOTES
Post-Discharge Transitional Care  Follow Up      Gorge Balderas Jr.   YOB: 1980    Date of Office Visit:  3/27/2025  Date of Hospital Admission: 3/17/25  Date of Hospital Discharge: 3/19/25  Risk of hospital readmission (high >=14%. Medium >=10%) :Readmission Risk Score: 8      Care management risk score Rising risk (score 2-5) and Complex Care (Scores >=6): No Risk Score On File     Non face to face  following discharge, date last encounter closed (first attempt may have been earlier): 03/21/2025    Call initiated 2 business days of discharge: Yes    ASSESSMENT/PLAN:   Uncontrolled type 2 diabetes mellitus with hyperglycemia (HCC)  Hemoglobin A1C   Date Value Ref Range Status   03/18/2025 13.8 (H) 4.0 - 5.6 % Final     Comment:     (NOTE)  HbA1C Interpretive Ranges  <5.7              Normal  5.7 - 6.4         Consider Prediabetes  >6.5              Consider Diabetes     Hx T2DM but ketone prone. Dx of type 1 during hospitalization.  Continue lantus 20 units BID and Humalog 5 units 3 times daily prior to meals.  Did not bring meter today but reports glucose averaging 150-230. No hypoglycemia.  Dexcom too expensive. Continue to check glucose 3-4 times daily. Notify provider if > 300 or < 70.  We reviewed diabetic diet and importance in regular exercise 3-5 times weekly for 30-45 minutes.  He is scheduled with diabetes eduction on 4/2/25.  Check feet daily and notify provider immediately if wound develops.  Encourage regular eye exams.  Will refer to endocrinology to evaluate and treat.  -     Basic Metabolic Panel  -     Lipid Panel  -     CBC with Auto Differential  -     [facility] - Shanice Stubbs MD, Endocrinology, [place]    Influenza  Resolved.    Medical Decision Making: moderate complexity    Return in about 3 months (around 6/27/2025), or if symptoms worsen or fail to improve.           Subjective:   HPI:  Follow up of Hospital problems/diagnosis(es): 45 y/o male with PMH of type 2 diabetes who

## 2025-03-27 NOTE — PROGRESS NOTES
Chief Complaint   Patient presents with    Follow-Up from Hospital     Inpatient 03/17/2025 thru 03/19/2025. [facility].    Diabetes     \"Have you been to the ER, urgent care clinic since your last visit?  Hospitalized since your last visit?\"    YES - When: approximately 2  weeks ago.  Where and Why: [facility] to [facility] for DKA.    “Have you seen or consulted any other health care providers outside our system since your last visit?”    NO      “Have you had a diabetic eye exam?”    NO     No diabetic eye exam on file             BP 97/62 (BP Site: Left Upper Arm, Patient Position: Sitting, BP Cuff Size: Medium Adult)   Pulse 94   Temp 97.8 °F (36.6 °C) (Oral)   Resp 18   Ht 1.676 m (5' 6\")   Wt 72.3 kg (159 lb 8 oz)   SpO2 94%   BMI 25.74 kg/m²

## 2025-03-29 LAB
BASOPHILS # BLD AUTO: 0 X10E3/UL (ref 0–0.2)
BASOPHILS NFR BLD AUTO: 0 %
BUN SERPL-MCNC: 15 MG/DL (ref 6–24)
BUN/CREAT SERPL: 25 (ref 9–20)
CALCIUM SERPL-MCNC: 9.3 MG/DL (ref 8.7–10.2)
CHLORIDE SERPL-SCNC: 99 MMOL/L (ref 96–106)
CHOLEST SERPL-MCNC: 163 MG/DL (ref 100–199)
CO2 SERPL-SCNC: 24 MMOL/L (ref 20–29)
CREAT SERPL-MCNC: 0.61 MG/DL (ref 0.76–1.27)
EGFRCR SERPLBLD CKD-EPI 2021: 123 ML/MIN/1.73
EOSINOPHIL # BLD AUTO: 0.3 X10E3/UL (ref 0–0.4)
EOSINOPHIL NFR BLD AUTO: 4 %
ERYTHROCYTE [DISTWIDTH] IN BLOOD BY AUTOMATED COUNT: 13.1 % (ref 11.6–15.4)
GLUCOSE SERPL-MCNC: 298 MG/DL (ref 70–99)
HCT VFR BLD AUTO: 39.5 % (ref 37.5–51)
HDLC SERPL-MCNC: 51 MG/DL
HGB BLD-MCNC: 12.8 G/DL (ref 13–17.7)
IMM GRANULOCYTES # BLD AUTO: 0 X10E3/UL (ref 0–0.1)
IMM GRANULOCYTES NFR BLD AUTO: 1 %
LDLC SERPL CALC-MCNC: 96 MG/DL (ref 0–99)
LYMPHOCYTES # BLD AUTO: 2.4 X10E3/UL (ref 0.7–3.1)
LYMPHOCYTES NFR BLD AUTO: 36 %
MCH RBC QN AUTO: 30.5 PG (ref 26.6–33)
MCHC RBC AUTO-ENTMCNC: 32.4 G/DL (ref 31.5–35.7)
MCV RBC AUTO: 94 FL (ref 79–97)
MONOCYTES # BLD AUTO: 0.4 X10E3/UL (ref 0.1–0.9)
MONOCYTES NFR BLD AUTO: 6 %
NEUTROPHILS # BLD AUTO: 3.5 X10E3/UL (ref 1.4–7)
NEUTROPHILS NFR BLD AUTO: 53 %
PLATELET # BLD AUTO: 356 X10E3/UL (ref 150–450)
POTASSIUM SERPL-SCNC: 5 MMOL/L (ref 3.5–5.2)
RBC # BLD AUTO: 4.19 X10E6/UL (ref 4.14–5.8)
SODIUM SERPL-SCNC: 135 MMOL/L (ref 134–144)
TRIGL SERPL-MCNC: 83 MG/DL (ref 0–149)
VLDLC SERPL CALC-MCNC: 16 MG/DL (ref 5–40)
WBC # BLD AUTO: 6.7 X10E3/UL (ref 3.4–10.8)

## 2025-03-30 ENCOUNTER — RESULTS FOLLOW-UP (OUTPATIENT)
Facility: CLINIC | Age: 45
End: 2025-03-30

## 2025-04-02 ENCOUNTER — TELEPHONE (OUTPATIENT)
Facility: CLINIC | Age: 45
End: 2025-04-02

## 2025-04-02 ENCOUNTER — OFFICE VISIT (OUTPATIENT)
Age: 45
End: 2025-04-02
Payer: MEDICAID

## 2025-04-02 DIAGNOSIS — Z79.4 TYPE 2 DIABETES MELLITUS WITH HYPERGLYCEMIA, WITH LONG-TERM CURRENT USE OF INSULIN (HCC): ICD-10-CM

## 2025-04-02 DIAGNOSIS — E11.65 UNCONTROLLED TYPE 2 DIABETES MELLITUS WITH HYPERGLYCEMIA (HCC): Primary | ICD-10-CM

## 2025-04-02 DIAGNOSIS — E11.65 TYPE 2 DIABETES MELLITUS WITH HYPERGLYCEMIA, WITH LONG-TERM CURRENT USE OF INSULIN (HCC): ICD-10-CM

## 2025-04-02 DIAGNOSIS — E10.10 DKA, TYPE 1, NOT AT GOAL (HCC): Primary | ICD-10-CM

## 2025-04-02 PROCEDURE — G0108 DIAB MANAGE TRN  PER INDIV: HCPCS

## 2025-04-02 NOTE — PROGRESS NOTES
[facility]  Diabetes Self-Management Education & Support Program    Reason for Referral: DSMES  Referral Source: Jeannette Kay MD  Services requested: DSMES       ASSESSMENT    From my perspective, the participant would benefit from DSMES specifically related to healthy eating and taking medications. Will adapt DSMES program to build on participant's skills score, confidence score, and preparedness score as noted in the Diabetes Skills, Confidence, and Preparedness Index.    During the program, we will focus on providing DSMES that specifically addresses participant's interest in reducing risks, healthy eating, taking medications, healthy coping, and problem solving, as shown by their reported readiness to change.    The participant would be best served by attending weekly group class series.    Diabetes Self-Management Education Follow-up Visit: Participant does not feel that he needs classes at this time.  He will call when ready.       Clinical Presentation  Gorge Balderas Jr. is a 44 y.o.  male referred for diabetes self-management education. Participant has Type 2 DM on insulin for 1-10 years. Family history positive for diabetes. Patient reports receiving DSMES services in the past. Most recent A1c value:   Lab Results   Component Value Date/Time    MLF5RVVI 15.0 08/15/2024 09:17 AM    LABA1C 13.8 03/18/2025 01:00 PM       Diabetes-related medical history:  Acute complications    Neurological complications  peripheral neuropathy  Diabetes-related medications:  Current dosing: insulin glargine - 100 UNIT/ML  insulin lispro Soln - 100 UNIT/ML    Blood Pressure Management  This patient does not have an active medication from one of the medication groupers.      Lipid Management  This patient does not have an active medication from one of the medication groupers.      Clot Prevention  This patient does not have an active medication from one of the medication

## 2025-04-25 ENCOUNTER — TELEPHONE (OUTPATIENT)
Facility: CLINIC | Age: 45
End: 2025-04-25

## 2025-04-25 DIAGNOSIS — E11.65 UNCONTROLLED TYPE 2 DIABETES MELLITUS WITH HYPERGLYCEMIA (HCC): Primary | ICD-10-CM

## 2025-04-25 RX ORDER — INSULIN LISPRO 100 [IU]/ML
0.08 INJECTION, SOLUTION INTRAVENOUS; SUBCUTANEOUS
Qty: 3 ML | Refills: 5 | Status: SHIPPED | OUTPATIENT
Start: 2025-04-25

## 2025-04-25 NOTE — TELEPHONE ENCOUNTER
Patient is requesting a refill on the medication;  insulin lispro (HUMALOG,ADMELOG) 100 UNIT/ML SOLN injection vial     Pt's pharmacy is [facility] in [facility]

## 2025-07-29 ENCOUNTER — OFFICE VISIT (OUTPATIENT)
Age: 45
End: 2025-07-29
Payer: MEDICAID

## 2025-07-29 VITALS
WEIGHT: 167 LBS | BODY MASS INDEX: 26.84 KG/M2 | HEIGHT: 66 IN | DIASTOLIC BLOOD PRESSURE: 75 MMHG | OXYGEN SATURATION: 96 % | SYSTOLIC BLOOD PRESSURE: 119 MMHG | TEMPERATURE: 98.1 F | RESPIRATION RATE: 16 BRPM | HEART RATE: 73 BPM

## 2025-07-29 DIAGNOSIS — Z79.4 UNCONTROLLED TYPE 2 DIABETES MELLITUS WITH HYPERGLYCEMIA, WITH LONG-TERM CURRENT USE OF INSULIN (HCC): ICD-10-CM

## 2025-07-29 DIAGNOSIS — E11.65 UNCONTROLLED TYPE 2 DIABETES MELLITUS WITH HYPERGLYCEMIA, WITH LONG-TERM CURRENT USE OF INSULIN (HCC): Primary | ICD-10-CM

## 2025-07-29 DIAGNOSIS — Z79.4 UNCONTROLLED TYPE 2 DIABETES MELLITUS WITH HYPERGLYCEMIA, WITH LONG-TERM CURRENT USE OF INSULIN (HCC): Primary | ICD-10-CM

## 2025-07-29 DIAGNOSIS — E11.65 UNCONTROLLED TYPE 2 DIABETES MELLITUS WITH HYPERGLYCEMIA, WITH LONG-TERM CURRENT USE OF INSULIN (HCC): ICD-10-CM

## 2025-07-29 PROCEDURE — 3046F HEMOGLOBIN A1C LEVEL >9.0%: CPT | Performed by: STUDENT IN AN ORGANIZED HEALTH CARE EDUCATION/TRAINING PROGRAM

## 2025-07-29 PROCEDURE — 99203 OFFICE O/P NEW LOW 30 MIN: CPT | Performed by: STUDENT IN AN ORGANIZED HEALTH CARE EDUCATION/TRAINING PROGRAM

## 2025-07-29 ASSESSMENT — ENCOUNTER SYMPTOMS
ABDOMINAL DISTENTION: 0
EYES NEGATIVE: 1
TROUBLE SWALLOWING: 0
EYE REDNESS: 0
SORE THROAT: 0
EYE DISCHARGE: 0
VOMITING: 0
DIARRHEA: 0
ABDOMINAL PAIN: 0
GASTROINTESTINAL NEGATIVE: 1
NAUSEA: 0
SHORTNESS OF BREATH: 0
COUGH: 0
RESPIRATORY NEGATIVE: 1
CONSTIPATION: 0

## 2025-07-29 NOTE — PROGRESS NOTES
Have you been to the ER, urgent care clinic since your last visit?  Hospitalized since your last visit?   NO    Have you seen or consulted any other health care providers outside our system since your last visit?   NO      “Have you had a diabetic eye exam?”    NO     No diabetic eye exam on file     Chief Complaint   Patient presents with    New Patient    Diabetes     /75 (BP Site: Left Upper Arm, Patient Position: Sitting, BP Cuff Size: Medium Adult)   Pulse 73   Temp 98.1 °F (36.7 °C) (Oral)   Resp 16   Ht 1.676 m (5' 6\")   Wt 75.8 kg (167 lb)   SpO2 96%   BMI 26.95 kg/m²

## 2025-07-29 NOTE — PROGRESS NOTES
[facility]                                      Patient Information Name : Gorge Balderas Jr. 44 y.o.   YOB: 1980         Referred by: Janine Piedra APRN - NP         Chief Complaint   Patient presents with    New Patient    Diabetes       History of Present Illness: Gorge Balderas Jr. is a 44 y.o. male here for initial visit of  Diabetes Mellitus.     Diabetes mellitus was diagnosed in 2019  . End organ effects of diabetes: peripheral neuropathy.         Monitoring frequency: not checking regularly   Last A1C was high at 13.8%    Hypoglycemia: No    Weight trend: stable  Prior visit with dietician: no  Current diet: not asked  Current exercise: none    Eye exam current (within one year): yes  JASMIN: unknown     No chest pain,blurred vision, no   No history of pancreatitis    Wt Readings from Last 3 Encounters:   07/29/25 75.8 kg (167 lb)   03/27/25 72.3 kg (159 lb 8 oz)   03/19/25 68.3 kg (150 lb 9.2 oz)       BP Readings from Last 3 Encounters:   07/29/25 119/75   03/27/25 97/62   03/19/25 106/73           Past Medical History:   Diagnosis Date    Diabetes mellitus (HCC) 12/16/2019    Type II       Past Surgical History:   Procedure Laterality Date    APPENDECTOMY         Family History   Problem Relation Age of Onset    Diabetes Paternal Grandmother     Hypertension Paternal Grandmother     Arthritis Father     No Known Problems Mother     Diabetes Maternal Grandmother         Current Outpatient Medications   Medication Sig    insulin lispro (HUMALOG,ADMELOG) 100 UNIT/ML SOLN injection vial Inject 5 Units into the skin 3 times daily (with meals)    insulin glargine (LANTUS) 100 UNIT/ML injection vial Inject 20 Units into the skin 2 times daily (Patient taking differently: Inject 15 Units into the skin daily)    Needles & Syringes MISC 1 each by Does not apply route daily    Continuous Glucose Sensor (FREESTYLE POLLO 2 SENSOR) MISC 1 each by Does not

## 2025-07-31 LAB
ALBUMIN SERPL-MCNC: 4.3 G/DL (ref 4.1–5.1)
ALBUMIN/CREAT UR: 10 MG/G CREAT (ref 0–29)
ALP SERPL-CCNC: 78 IU/L (ref 44–121)
ALT SERPL-CCNC: 13 IU/L (ref 0–44)
AST SERPL-CCNC: 10 IU/L (ref 0–40)
BILIRUB SERPL-MCNC: 1.1 MG/DL (ref 0–1.2)
BUN SERPL-MCNC: 11 MG/DL (ref 6–24)
BUN/CREAT SERPL: 18 (ref 9–20)
C PEPTIDE SERPL-MCNC: 0.8 NG/ML (ref 1.1–4.4)
CALCIUM SERPL-MCNC: 9.5 MG/DL (ref 8.7–10.2)
CHLORIDE SERPL-SCNC: 103 MMOL/L (ref 96–106)
CO2 SERPL-SCNC: 25 MMOL/L (ref 20–29)
CREAT SERPL-MCNC: 0.6 MG/DL (ref 0.76–1.27)
CREAT UR-MCNC: 293.4 MG/DL
EGFRCR SERPLBLD CKD-EPI 2021: 124 ML/MIN/1.73
EST. AVERAGE GLUCOSE BLD GHB EST-MCNC: 140 MG/DL
GLOBULIN SER CALC-MCNC: 2.9 G/DL (ref 1.5–4.5)
GLUCOSE SERPL-MCNC: 86 MG/DL (ref 70–99)
HBA1C MFR BLD: 6.5 % (ref 4.8–5.6)
HCT VFR BLD AUTO: 38.6 % (ref 37.5–51)
HGB BLD-MCNC: 12.7 G/DL (ref 13–17.7)
MICROALBUMIN UR-MCNC: 29.6 UG/ML
POTASSIUM SERPL-SCNC: 4.1 MMOL/L (ref 3.5–5.2)
PROT SERPL-MCNC: 7.2 G/DL (ref 6–8.5)
SODIUM SERPL-SCNC: 139 MMOL/L (ref 134–144)

## (undated) DEVICE — BAND RADIAL COMPR ARTERY 24CM -- REG BX/10

## (undated) DEVICE — 3M™ STERI-DRAPE™ SMALL DRAPE WITH ADHESIVE APERTURE 1092 25/BX,4/CS&#X20;: Brand: STERI-DRAPE™

## (undated) DEVICE — GLIDESHEATH SLENDER STAINLESS STEEL KIT: Brand: GLIDESHEATH SLENDER

## (undated) DEVICE — SYRINGE ANGIO 20ML WHT POLYCARB VAC PRSS CAP PLUNG FIX M

## (undated) DEVICE — CATH 5F 100CM JL35 -- DXTERITY

## (undated) DEVICE — Device

## (undated) DEVICE — SYRINGE MED 10ML PUR GAM COMPATIBLE POLYCARB FIX M LUER CONN

## (undated) DEVICE — WASTEBAG DRIP/ADAPTER: Brand: MEDLINE INDUSTRIES, INC.

## (undated) DEVICE — CATH 5F 100CM JR40 -- DXTERITY

## (undated) DEVICE — SYRINGE MED 10ML RED POLYCARB BRL FIX M LUER CONN FLAT GRP

## (undated) DEVICE — SURGICAL PROCEDURE TRAY CRD CATH 3 PRT

## (undated) DEVICE — GUIDEWIRE VASC L260CM DIA0.035IN TIP L3MM STD EXCHG PTFE J